# Patient Record
Sex: MALE | Race: WHITE | NOT HISPANIC OR LATINO | URBAN - METROPOLITAN AREA
[De-identification: names, ages, dates, MRNs, and addresses within clinical notes are randomized per-mention and may not be internally consistent; named-entity substitution may affect disease eponyms.]

---

## 2018-09-19 PROBLEM — F41.0 PANIC DISORDER WITHOUT AGORAPHOBIA: Chronic | Status: ACTIVE | Noted: 2018-09-19

## 2018-09-19 PROBLEM — M51.26 DISPLACEMENT OF LUMBAR INTERVERTEBRAL DISC WITHOUT MYELOPATHY: Chronic | Status: ACTIVE | Noted: 2018-09-19

## 2018-09-19 PROBLEM — J30.9 ATOPIC RHINITIS: Chronic | Status: ACTIVE | Noted: 2018-09-19

## 2018-09-19 PROBLEM — F90.0 ADHD, PREDOMINANTLY INATTENTIVE TYPE: Chronic | Status: ACTIVE | Noted: 2018-09-19

## 2018-09-19 RX ORDER — CANDESARTAN CILEXETIL AND HYDROCHLOROTHIAZIDE 32; 12.5 MG/1; MG/1
1 TABLET ORAL DAILY
COMMUNITY
End: 2019-01-09 | Stop reason: SDUPTHER

## 2018-09-19 RX ORDER — DEXTROAMPHETAMINE SACCHARATE, AMPHETAMINE ASPARTATE MONOHYDRATE, DEXTROAMPHETAMINE SULFATE AND AMPHETAMINE SULFATE 7.5; 7.5; 7.5; 7.5 MG/1; MG/1; MG/1; MG/1
CAPSULE, EXTENDED RELEASE ORAL
Refills: 0 | COMMUNITY
Start: 2018-08-20 | End: 2018-10-02 | Stop reason: SDUPTHER

## 2018-09-19 RX ORDER — ROSUVASTATIN CALCIUM 10 MG/1
10 TABLET, COATED ORAL DAILY
COMMUNITY
End: 2018-11-08 | Stop reason: SDUPTHER

## 2018-10-02 ENCOUNTER — OFFICE VISIT (OUTPATIENT)
Dept: PRIMARY CARE | Facility: CLINIC | Age: 53
End: 2018-10-02
Payer: COMMERCIAL

## 2018-10-02 VITALS
WEIGHT: 188.6 LBS | DIASTOLIC BLOOD PRESSURE: 80 MMHG | BODY MASS INDEX: 26.4 KG/M2 | SYSTOLIC BLOOD PRESSURE: 120 MMHG | HEART RATE: 74 BPM | TEMPERATURE: 98.6 F | HEIGHT: 71 IN

## 2018-10-02 DIAGNOSIS — J06.9 ACUTE URI: Primary | ICD-10-CM

## 2018-10-02 DIAGNOSIS — F90.0 ADHD, PREDOMINANTLY INATTENTIVE TYPE: Chronic | ICD-10-CM

## 2018-10-02 PROCEDURE — 99213 OFFICE O/P EST LOW 20 MIN: CPT | Performed by: INTERNAL MEDICINE

## 2018-10-02 RX ORDER — DEXTROAMPHETAMINE SACCHARATE, AMPHETAMINE ASPARTATE MONOHYDRATE, DEXTROAMPHETAMINE SULFATE AND AMPHETAMINE SULFATE 7.5; 7.5; 7.5; 7.5 MG/1; MG/1; MG/1; MG/1
30 CAPSULE, EXTENDED RELEASE ORAL EVERY MORNING
Qty: 30 CAPSULE | Refills: 0 | Status: SHIPPED | OUTPATIENT
Start: 2018-11-02 | End: 2018-10-02 | Stop reason: SDUPTHER

## 2018-10-02 RX ORDER — DEXTROAMPHETAMINE SACCHARATE, AMPHETAMINE ASPARTATE MONOHYDRATE, DEXTROAMPHETAMINE SULFATE AND AMPHETAMINE SULFATE 7.5; 7.5; 7.5; 7.5 MG/1; MG/1; MG/1; MG/1
30 CAPSULE, EXTENDED RELEASE ORAL EVERY MORNING
Qty: 30 CAPSULE | Refills: 0 | Status: SHIPPED | OUTPATIENT
Start: 2018-10-02 | End: 2018-10-02 | Stop reason: SDUPTHER

## 2018-10-02 RX ORDER — AMOXICILLIN AND CLAVULANATE POTASSIUM 875; 125 MG/1; MG/1
1 TABLET, FILM COATED ORAL 2 TIMES DAILY
Qty: 14 TABLET | Refills: 0 | Status: SHIPPED | OUTPATIENT
Start: 2018-10-02 | End: 2018-12-18

## 2018-10-02 RX ORDER — DEXTROAMPHETAMINE SACCHARATE, AMPHETAMINE ASPARTATE MONOHYDRATE, DEXTROAMPHETAMINE SULFATE AND AMPHETAMINE SULFATE 7.5; 7.5; 7.5; 7.5 MG/1; MG/1; MG/1; MG/1
30 CAPSULE, EXTENDED RELEASE ORAL EVERY MORNING
Qty: 30 CAPSULE | Refills: 0 | Status: SHIPPED | OUTPATIENT
Start: 2018-12-02 | End: 2018-12-18 | Stop reason: SDUPTHER

## 2018-10-02 ASSESSMENT — ENCOUNTER SYMPTOMS
HEADACHES: 1
SINUS COMPLAINT: 1
SORE THROAT: 1

## 2018-10-02 NOTE — PROGRESS NOTES
[facility]  Dr. Jo Chow  [address]  [place] [address]  Phone: [phone]  Fax: [phone]      Patient ID: Riki Bundy                              : 1965    Visit Date: 10/4/2018    Chief Complaint: ADD (Needs med refill) and Sinus Problem (started Saturday)      HPI  Complains of sinus pain and congestion. Went to Urgent Care after a week and was told it was allergies. Did eventually get better and now has been sick again. Had been helping a person move out of a very dirty house. Feels now that he has a sinus infection with swollen upper cheeks. Seeing green yellow with occasional cough from his throat.      ADD   This is a chronic problem. The current episode started more than 1 year ago. The problem has been unchanged. Associated symptoms include congestion, headaches and a sore throat.   Sinus Problem   This is a new problem. The current episode started in the past 7 days. The problem has been gradually worsening since onset. The fever has been present for 1 to 2 days. Associated symptoms include congestion, headaches and a sore throat. Past treatments include nothing.     On Adderall and the dosing is working fine.    Subjective      Patient ID: Riki Bundy is a 53 y.o. male.    Patient Active Problem List   Diagnosis   • Hypercholesteremia   • Essential hypertension, benign   • Atopic rhinitis   • ADHD, predominantly inattentive type   • Panic disorder without agoraphobia   • Displacement of lumbar intervertebral disc without myelopathy         Current Outpatient Prescriptions:   •  [START ON 2018] amphetamine-dextroamphetamine XR (ADDERALL XR) 30 mg 24 hr capsule, Take 1 capsule (30 mg total) by mouth every morning Earliest Fill Date: 18., Disp: 30 capsule, Rfl: 0  •  candesartan-hydrochlorothiazid (ATACAND HCT) 32-12.5 mg per tablet, Take 1 tablet by mouth daily., Disp: , Rfl:   •  rosuvastatin (CRESTOR) 10 mg tablet,  "Take 10 mg by mouth daily., Disp: , Rfl:   •  amoxicillin-pot clavulanate (AUGMENTIN) 875-125 mg per tablet, Take 1 tablet by mouth 2 (two) times a day for 7 days., Disp: 14 tablet, Rfl: 0    Allergies   Allergen Reactions   • Sulfa (Sulfonamide Antibiotics) Other (see comments)     Get fever to the point of fainting   • Cephalosporins Rash       Social History     Social History   • Marital status:      Spouse name: N/A   • Number of children: N/A   • Years of education: N/A     Occupational History   • Not on file.     Social History Main Topics   • Smoking status: Never Smoker   • Smokeless tobacco: Never Used   • Alcohol use Not on file   • Drug use: Unknown   • Sexual activity: Not on file     Other Topics Concern   • Not on file     Social History Narrative   • No narrative on file       Health Maintenance   Topic Date Due   • Influenza Vaccine (1) 08/01/2018   • DTaP, Tdap, and Td Vaccines (2 - Td) 05/02/2021   • Colorectal Cancer Screening  09/21/2025   • HPV Vaccines  Aged Out   • Meningococcal Vaccine  Aged Out   • HIB Vaccines  Aged Out   • IPV Vaccines  Aged Out   • Hepatitis C Screening  Completed       Past Medical History:   Diagnosis Date   • Acute allergic rhinitis    • ADHD (attention deficit hyperactivity disorder), inattentive type    • Displacement of lumbar intervertebral disc without myelopathy    • Essential hypertension, benign    • Hypercholesteremia    • Panic disorder without agoraphobia        The following have been reviewed and updated as appropriate in this visit:         Review of System  Review of Systems   HENT: Positive for congestion and sore throat.    Neurological: Positive for headaches.       Objective     Vitals  Vitals:    10/02/18 1125   BP: 120/80   Pulse: 74   Temp: 37 °C (98.6 °F)   Weight: 85.5 kg (188 lb 9.6 oz)   Height: 1.803 m (5' 11\")       Body mass index is 26.3 kg/m².    Physical Exam  Physical Exam   Constitutional: He appears well-nourished. No " distress.   HENT:   Head: Normocephalic.   Nose: Nose normal.   Mouth/Throat: Posterior oropharyngeal erythema present. No posterior oropharyngeal edema.   with fluid noted beneath the right orbit and swelling of the left cheek   Eyes: Conjunctivae and EOM are normal. Pupils are equal, round, and reactive to light. Right eye exhibits no discharge. Left eye exhibits no discharge.   Neck: Normal range of motion. Neck supple.   Cardiovascular: Normal rate, regular rhythm, normal heart sounds and intact distal pulses.  Exam reveals no gallop.    No murmur heard.  Pulmonary/Chest: Effort normal and breath sounds normal. He has no wheezes. He has no rales.   Abdominal: Soft. Bowel sounds are normal. He exhibits no distension. There is no tenderness.   Musculoskeletal: Normal range of motion. He exhibits no edema or deformity.   Lymphadenopathy:     He has no cervical adenopathy.   Skin: Skin is warm and dry.   Vitals reviewed.      Assessment/Plan     Problem List Items Addressed This Visit     ADHD, predominantly inattentive type (Chronic)     3 scripts provided for Adderall.         Relevant Medications    amphetamine-dextroamphetamine XR (ADDERALL XR) 30 mg 24 hr capsule (Start on 12/2/2018)      Other Visit Diagnoses     Acute URI    -  Primary    Rest and fluids. Call if sxs fail to clear.    Relevant Medications    amoxicillin-pot clavulanate (AUGMENTIN) 875-125 mg per tablet        Greater than 50% of the visit time was spent in counseling and coordination of care.  20  minutes.      There are no Patient Instructions on file for this visit.    No Follow-up on file.      Jo Chow MD  10/4/2018

## 2018-11-08 RX ORDER — ROSUVASTATIN CALCIUM 10 MG/1
10 TABLET, FILM COATED ORAL
Qty: 90 TABLET | Refills: 3 | Status: SHIPPED | OUTPATIENT
Start: 2018-11-08 | End: 2020-06-05 | Stop reason: SDUPTHER

## 2018-11-08 RX ORDER — ROSUVASTATIN CALCIUM 10 MG/1
TABLET, FILM COATED ORAL
Qty: 90 TABLET | Refills: 3 | Status: SHIPPED | OUTPATIENT
Start: 2018-11-08 | End: 2018-11-08 | Stop reason: SDUPTHER

## 2018-11-23 ENCOUNTER — TELEPHONE (OUTPATIENT)
Dept: PRIMARY CARE | Facility: CLINIC | Age: 53
End: 2018-11-23

## 2018-11-23 NOTE — TELEPHONE ENCOUNTER
S/W pt at length-went to Urgent Care, then ER and has also followed up with his eye doctor, is doing better and will continue to monitor

## 2018-11-23 NOTE — TELEPHONE ENCOUNTER
Received a fax from ER pt was seen 11/21/2018 for Corneal Abrasion . Was sent to  please call and follow up

## 2018-12-18 ENCOUNTER — OFFICE VISIT (OUTPATIENT)
Dept: PRIMARY CARE | Facility: CLINIC | Age: 53
End: 2018-12-18
Payer: COMMERCIAL

## 2018-12-18 VITALS
SYSTOLIC BLOOD PRESSURE: 126 MMHG | BODY MASS INDEX: 26.88 KG/M2 | HEIGHT: 71 IN | DIASTOLIC BLOOD PRESSURE: 78 MMHG | WEIGHT: 192 LBS | HEART RATE: 68 BPM

## 2018-12-18 DIAGNOSIS — I10 ESSENTIAL HYPERTENSION, BENIGN: ICD-10-CM

## 2018-12-18 DIAGNOSIS — F90.0 ADHD, PREDOMINANTLY INATTENTIVE TYPE: Primary | Chronic | ICD-10-CM

## 2018-12-18 PROCEDURE — 99213 OFFICE O/P EST LOW 20 MIN: CPT | Performed by: INTERNAL MEDICINE

## 2018-12-18 RX ORDER — DEXTROAMPHETAMINE SACCHARATE, AMPHETAMINE ASPARTATE MONOHYDRATE, DEXTROAMPHETAMINE SULFATE AND AMPHETAMINE SULFATE 7.5; 7.5; 7.5; 7.5 MG/1; MG/1; MG/1; MG/1
30 CAPSULE, EXTENDED RELEASE ORAL EVERY MORNING
Qty: 30 CAPSULE | Refills: 0 | Status: SHIPPED | OUTPATIENT
Start: 2019-01-18 | End: 2018-12-18 | Stop reason: SDUPTHER

## 2018-12-18 RX ORDER — DEXTROAMPHETAMINE SACCHARATE, AMPHETAMINE ASPARTATE MONOHYDRATE, DEXTROAMPHETAMINE SULFATE AND AMPHETAMINE SULFATE 7.5; 7.5; 7.5; 7.5 MG/1; MG/1; MG/1; MG/1
30 CAPSULE, EXTENDED RELEASE ORAL EVERY MORNING
Qty: 30 CAPSULE | Refills: 0 | Status: SHIPPED | OUTPATIENT
Start: 2019-02-18 | End: 2019-04-12 | Stop reason: SDUPTHER

## 2018-12-18 RX ORDER — DEXTROAMPHETAMINE SACCHARATE, AMPHETAMINE ASPARTATE MONOHYDRATE, DEXTROAMPHETAMINE SULFATE AND AMPHETAMINE SULFATE 7.5; 7.5; 7.5; 7.5 MG/1; MG/1; MG/1; MG/1
30 CAPSULE, EXTENDED RELEASE ORAL EVERY MORNING
Qty: 30 CAPSULE | Refills: 0 | Status: SHIPPED | OUTPATIENT
Start: 2018-12-18 | End: 2018-12-18 | Stop reason: SDUPTHER

## 2018-12-18 NOTE — PROGRESS NOTES
[facility]  Dr. Jo Chow  [address]  [place] [address]  Phone: [phone]  Fax: [phone]      Patient ID: Riki Bundy                              : 1965    Visit Date: 2018    Chief Complaint: Med Refill      HPI  Was seen in the ER with a corneal abrasion. Was clearing brush into the woods at sunset with sun glasses.      ADHD   This is a chronic problem. The current episode started more than 1 year ago. The problem occurs daily. The problem has been unchanged. Pertinent negatives include no chest pain, fatigue, headaches or weakness. The symptoms are aggravated by stress.   Hypertension   This is a chronic problem. The current episode started more than 1 year ago. The problem has been waxing and waning since onset. The problem is controlled. Pertinent negatives include no chest pain, headaches, palpitations or shortness of breath. Agents associated with hypertension include amphetamines. Risk factors for coronary artery disease include male gender and stress. Past treatments include diuretics and angiotensin blockers. The current treatment provides significant improvement. There are no compliance problems.        Subjective      Patient ID: Riki Bundy is a 53 y.o. male.    Patient Active Problem List   Diagnosis   • Hypercholesteremia   • Essential hypertension, benign   • Atopic rhinitis   • ADHD, predominantly inattentive type   • Panic disorder without agoraphobia   • Displacement of lumbar intervertebral disc without myelopathy         Current Outpatient Prescriptions:   •  [START ON 2019] amphetamine-dextroamphetamine XR (ADDERALL XR) 30 mg 24 hr capsule, Take 1 capsule (30 mg total) by mouth every morning Earliest Fill Date: 19., Disp: 30 capsule, Rfl: 0  •  candesartan-hydrochlorothiazid (ATACAND HCT) 32-12.5 mg per tablet, Take 1 tablet by mouth daily., Disp: , Rfl:   •  CRESTOR 10 mg tablet, Take 1 tablet  "(10 mg total) by mouth once daily., Disp: 90 tablet, Rfl: 3    Allergies   Allergen Reactions   • Sulfa (Sulfonamide Antibiotics) Other (see comments)     Get fever to the point of fainting   • Cephalosporins Rash       Social History     Social History   • Marital status:      Spouse name: N/A   • Number of children: N/A   • Years of education: N/A     Occupational History   • Not on file.     Social History Main Topics   • Smoking status: Never Smoker   • Smokeless tobacco: Never Used   • Alcohol use Not on file   • Drug use: Unknown   • Sexual activity: Not on file     Other Topics Concern   • Not on file     Social History Narrative   • No narrative on file       Health Maintenance   Topic Date Due   • Influenza Vaccine (1) 08/01/2018   • DTaP, Tdap, and Td Vaccines (2 - Td) 05/02/2021   • Colonoscopy  09/21/2025   • HPV Vaccines  Aged Out   • Meningococcal Vaccine  Aged Out   • HIB Vaccines  Aged Out   • IPV Vaccines  Aged Out   • Hepatitis C Screening  Completed       Past Medical History:   Diagnosis Date   • Acute allergic rhinitis    • ADHD (attention deficit hyperactivity disorder), inattentive type    • Displacement of lumbar intervertebral disc without myelopathy    • Essential hypertension, benign    • Hypercholesteremia    • Panic disorder without agoraphobia        The following have been reviewed and updated as appropriate in this visit:  Allergies  Meds  Problems         Review of System  Review of Systems   Constitutional: Negative for activity change and fatigue.   Respiratory: Negative for shortness of breath.    Cardiovascular: Negative for chest pain and palpitations.   Neurological: Negative for dizziness, weakness and headaches.       Objective     Vitals  Vitals:    12/18/18 1043   BP: 126/78   Pulse: 68   Weight: 87.1 kg (192 lb)   Height: 1.803 m (5' 11\")       Body mass index is 26.78 kg/m².    Physical Exam  Physical Exam   Constitutional: He is oriented to person, place, and " time. He appears well-nourished. No distress.   HENT:   Head: Normocephalic.   Nose: Nose normal.   Mouth/Throat: Oropharynx is clear and moist.   Eyes: Conjunctivae and EOM are normal. Pupils are equal, round, and reactive to light.   Neck: Normal range of motion. Neck supple. Carotid bruit is not present.   Cardiovascular: Normal rate, regular rhythm and normal heart sounds.  Exam reveals no gallop.    No murmur heard.  Pulmonary/Chest: Effort normal and breath sounds normal.   Abdominal: Soft. Bowel sounds are normal.   Neurological: He is alert and oriented to person, place, and time. Coordination normal.   Vitals reviewed.      Assessment/Plan     Problem List Items Addressed This Visit     Essential hypertension, benign     Stable with current therapy.         ADHD, predominantly inattentive type - Primary (Chronic)    Relevant Medications    amphetamine-dextroamphetamine XR (ADDERALL XR) 30 mg 24 hr capsule (Start on 2/18/2019)          There are no Patient Instructions on file for this visit.    Return in about 3 months (around 3/18/2019).      Jo Chow MD  12/20/2018

## 2018-12-20 ASSESSMENT — ENCOUNTER SYMPTOMS
WEAKNESS: 0
ACTIVITY CHANGE: 0
HEADACHES: 0
FATIGUE: 0
HYPERTENSION: 1
DIZZINESS: 0
SHORTNESS OF BREATH: 0
PALPITATIONS: 0

## 2019-01-09 ENCOUNTER — OFFICE VISIT (OUTPATIENT)
Dept: PRIMARY CARE | Facility: CLINIC | Age: 54
End: 2019-01-09
Payer: COMMERCIAL

## 2019-01-09 VITALS
HEIGHT: 71 IN | DIASTOLIC BLOOD PRESSURE: 90 MMHG | WEIGHT: 190 LBS | HEART RATE: 68 BPM | BODY MASS INDEX: 26.6 KG/M2 | TEMPERATURE: 97.9 F | SYSTOLIC BLOOD PRESSURE: 140 MMHG

## 2019-01-09 DIAGNOSIS — I10 ESSENTIAL HYPERTENSION, BENIGN: ICD-10-CM

## 2019-01-09 DIAGNOSIS — J01.00 ACUTE NON-RECURRENT MAXILLARY SINUSITIS: Primary | ICD-10-CM

## 2019-01-09 DIAGNOSIS — Z12.5 PROSTATE CANCER SCREENING: ICD-10-CM

## 2019-01-09 DIAGNOSIS — M54.2 NECK PAIN: ICD-10-CM

## 2019-01-09 DIAGNOSIS — M25.562 ACUTE PAIN OF LEFT KNEE: ICD-10-CM

## 2019-01-09 DIAGNOSIS — E78.00 HYPERCHOLESTEREMIA: ICD-10-CM

## 2019-01-09 PROCEDURE — 99214 OFFICE O/P EST MOD 30 MIN: CPT | Performed by: NURSE PRACTITIONER

## 2019-01-09 RX ORDER — CANDESARTAN CILEXETIL AND HYDROCHLOROTHIAZIDE 32; 12.5 MG/1; MG/1
1 TABLET ORAL DAILY
Qty: 14 TABLET | Refills: 0 | Status: SHIPPED | OUTPATIENT
Start: 2019-01-09 | End: 2019-01-09 | Stop reason: SDUPTHER

## 2019-01-09 RX ORDER — CANDESARTAN CILEXETIL AND HYDROCHLOROTHIAZIDE 32; 12.5 MG/1; MG/1
1 TABLET ORAL DAILY
Qty: 90 TABLET | Refills: 1 | Status: SHIPPED | OUTPATIENT
Start: 2019-01-09 | End: 2019-06-27 | Stop reason: SDUPTHER

## 2019-01-09 RX ORDER — AMOXICILLIN AND CLAVULANATE POTASSIUM 875; 125 MG/1; MG/1
1 TABLET, FILM COATED ORAL 2 TIMES DAILY
Qty: 20 TABLET | Refills: 0 | Status: SHIPPED | OUTPATIENT
Start: 2019-01-09 | End: 2019-04-12

## 2019-01-09 ASSESSMENT — ENCOUNTER SYMPTOMS
HEADACHES: 1
BLURRED VISION: 0
COUGH: 0
SHORTNESS OF BREATH: 0
MUSCLE WEAKNESS: 0
INABILITY TO BEAR WEIGHT: 0
SINUS PRESSURE: 1
CHILLS: 0
LOSS OF MOTION: 0
NECK PAIN: 0
SORE THROAT: 0
LOSS OF SENSATION: 0
NUMBNESS: 0
HOARSE VOICE: 0
ORTHOPNEA: 0
SINUS COMPLAINT: 1
SWEATS: 0
TINGLING: 0
SWOLLEN GLANDS: 0
HYPERTENSION: 1
PALPITATIONS: 0
DIAPHORESIS: 0
PND: 0

## 2019-01-09 NOTE — PROGRESS NOTES
[facility]  Lola Bonilla  [address]  [place] [address]  Phone: [phone]  Fax: [phone]      Patient ID: Riki Bundy                              : 1965    Visit Date: 2019    Chief Complaint: Sinus Problem         Patient ID: Riki Bundy is a 53 y.o. male.    Patient Active Problem List   Diagnosis   • Hypercholesteremia   • Essential hypertension, benign   • Atopic rhinitis   • ADHD, predominantly inattentive type   • Panic disorder without agoraphobia   • Displacement of lumbar intervertebral disc without myelopathy   • Acute pain of left knee   • Neck pain         Current Outpatient Prescriptions:   •  amoxicillin-pot clavulanate (AUGMENTIN) 875-125 mg per tablet, Take 1 tablet by mouth 2 (two) times a day for 10 days., Disp: 20 tablet, Rfl: 0  •  [START ON 2019] amphetamine-dextroamphetamine XR (ADDERALL XR) 30 mg 24 hr capsule, Take 1 capsule (30 mg total) by mouth every morning Earliest Fill Date: 19., Disp: 30 capsule, Rfl: 0  •  candesartan-hydrochlorothiazid (ATACAND HCT) 32-12.5 mg per tablet, Take 1 tablet by mouth daily., Disp: 90 tablet, Rfl: 1  •  CRESTOR 10 mg tablet, Take 1 tablet (10 mg total) by mouth once daily., Disp: 90 tablet, Rfl: 3    Allergies   Allergen Reactions   • Sulfa (Sulfonamide Antibiotics) Other (see comments)     Get fever to the point of fainting   • Cephalosporins Rash       Social History     Social History   • Marital status:      Spouse name: N/A   • Number of children: N/A   • Years of education: N/A     Occupational History   • Not on file.     Social History Main Topics   • Smoking status: Never Smoker   • Smokeless tobacco: Never Used   • Alcohol use Not on file   • Drug use: Unknown   • Sexual activity: Not on file     Other Topics Concern   • Not on file     Social History Narrative   • No narrative on file       Health Maintenance   Topic Date Due   • Influenza  Vaccine (1) 08/01/2018   • DTaP, Tdap, and Td Vaccines (2 - Td) 05/02/2021   • Colonoscopy  09/21/2025   • HPV Vaccines  Aged Out   • Meningococcal Vaccine  Aged Out   • HIB Vaccines  Aged Out   • IPV Vaccines  Aged Out   • Hepatitis C Screening  Completed       HPI  Here for sinus issues for the past week. Worsening pain and pressure. Hx of sinusitis. Augmentin works best for him.    Ran out of his BP meds in the past week. Needs mail order and local pharm to hold him over. No recent labs.    Acute knee and neck pain. Worse lately with having to do a clean out of his sister in law's basement who was a hoarder.       Sinus Problem   This is a new problem. The current episode started in the past 7 days. The problem has been gradually worsening since onset. There has been no fever. The pain is moderate. Associated symptoms include congestion, headaches and sinus pressure. Pertinent negatives include no chills, coughing, diaphoresis, ear pain, hoarse voice, neck pain, shortness of breath, sneezing, sore throat or swollen glands. Past treatments include nothing.   Hypertension   This is a chronic problem. The current episode started more than 1 year ago. The problem is unchanged. The problem is controlled. Associated symptoms include headaches. Pertinent negatives include no anxiety, blurred vision, chest pain, malaise/fatigue, neck pain, orthopnea, palpitations, peripheral edema, PND, shortness of breath or sweats. There are no associated agents to hypertension. Risk factors for coronary artery disease include dyslipidemia and male gender. Past treatments include diuretics and angiotensin blockers. The current treatment provides significant improvement. There are no compliance problems.    Neck Pain    This is a new problem. The problem occurs daily. The problem has been unchanged. The pain is present in the occipital region. The quality of the pain is described as aching and shooting. The pain is at a severity of  "4/10. The pain is moderate. Associated symptoms include headaches. Pertinent negatives include no chest pain, numbness or tingling. He has tried nothing for the symptoms.   Knee Pain    There was no injury mechanism. The pain is present in the left knee. The quality of the pain is described as aching. The pain is at a severity of 3/10. The pain is mild. The pain has been fluctuating since onset. Pertinent negatives include no inability to bear weight, loss of motion, loss of sensation, muscle weakness, numbness or tingling. Associated symptoms comments: Popping and crepitus. He reports no foreign bodies present. The symptoms are aggravated by movement and weight bearing. He has tried nothing for the symptoms.       The following have been reviewed and updated as appropriate in this visit:  Allergies  Meds  Problems         Review of System  Review of Systems   Constitutional: Negative for chills, diaphoresis and malaise/fatigue.   HENT: Positive for congestion and sinus pressure. Negative for ear pain, hoarse voice, sneezing and sore throat.    Eyes: Negative for blurred vision.   Respiratory: Negative for cough and shortness of breath.    Cardiovascular: Negative for chest pain, palpitations, orthopnea and PND.   Musculoskeletal: Negative for neck pain.   Neurological: Positive for headaches. Negative for tingling and numbness.       Objective     Vitals  Vitals:    01/09/19 1225   BP: 140/90   Pulse: 68   Temp: 36.6 °C (97.9 °F)   Weight: 86.2 kg (190 lb)   Height: 1.803 m (5' 11\")     Body mass index is 26.5 kg/m².    Physical Exam  Physical Exam   Constitutional: He is oriented to person, place, and time. He appears well-developed and well-nourished. No distress.   HENT:   Right Ear: Tympanic membrane, external ear and ear canal normal.   Left Ear: Tympanic membrane, external ear and ear canal normal.   Nose: Mucosal edema present. No rhinorrhea or sinus tenderness. Right sinus exhibits maxillary sinus " tenderness and frontal sinus tenderness. Left sinus exhibits maxillary sinus tenderness and frontal sinus tenderness.   Mouth/Throat: Posterior oropharyngeal erythema present. No oropharyngeal exudate, posterior oropharyngeal edema or tonsillar abscesses.   Neck: Neck supple. No JVD present. No thyromegaly present.   Cardiovascular: Normal rate, regular rhythm and normal heart sounds.    No murmur heard.  Pulmonary/Chest: Effort normal and breath sounds normal. No respiratory distress. He has no wheezes.   Musculoskeletal:        Left knee: He exhibits normal range of motion, no swelling, no effusion and no erythema. No tenderness found.        Cervical back: He exhibits decreased range of motion, pain and spasm. He exhibits no tenderness, no swelling and no edema.   Lymphadenopathy:     He has no cervical adenopathy.   Neurological: He is alert and oriented to person, place, and time.   Skin: He is not diaphoretic.   Vitals reviewed.      Assessment/Plan     Problem List Items Addressed This Visit     Hypercholesteremia     Labs ordered on statin.         Relevant Orders    Lipid panel    Essential hypertension, benign     Resume meds.  #14 day and #90 day given  Screening labs ordered  Follow up BP check 4 weeks         Relevant Medications    candesartan-hydrochlorothiazid (ATACAND HCT) 32-12.5 mg per tablet    Other Relevant Orders    CBC and Differential    Comprehensive metabolic panel    Urinalysis with Reflex Culture    Acute pain of left knee     Xray L knee.  R.I.C.E  Follow up to discuss 4 weeks         Relevant Orders    X-RAY KNEE LEFT 4+ VIEWS    Neck pain     C spine xray.  Discuss treatment at next visit.         Relevant Orders    X-RAY CERVICAL SPINE COMPLETE 4 OR 5 VIEWS      Other Visit Diagnoses     Acute non-recurrent maxillary sinusitis    -  Primary    Relevant Medications    amoxicillin-pot clavulanate (AUGMENTIN) 875-125 mg per tablet    Prostate cancer screening        Relevant Orders     PSA              LAURA Baum  1/9/2019

## 2019-04-12 ENCOUNTER — OFFICE VISIT (OUTPATIENT)
Dept: PRIMARY CARE | Facility: CLINIC | Age: 54
End: 2019-04-12
Payer: COMMERCIAL

## 2019-04-12 VITALS
BODY MASS INDEX: 25.62 KG/M2 | SYSTOLIC BLOOD PRESSURE: 124 MMHG | WEIGHT: 183 LBS | HEIGHT: 71 IN | HEART RATE: 62 BPM | OXYGEN SATURATION: 98 % | DIASTOLIC BLOOD PRESSURE: 82 MMHG

## 2019-04-12 DIAGNOSIS — E78.00 HYPERCHOLESTEREMIA: ICD-10-CM

## 2019-04-12 DIAGNOSIS — I10 ESSENTIAL HYPERTENSION, BENIGN: ICD-10-CM

## 2019-04-12 DIAGNOSIS — F90.0 ADHD, PREDOMINANTLY INATTENTIVE TYPE: Primary | Chronic | ICD-10-CM

## 2019-04-12 PROCEDURE — 99213 OFFICE O/P EST LOW 20 MIN: CPT | Performed by: INTERNAL MEDICINE

## 2019-04-12 RX ORDER — DEXTROAMPHETAMINE SACCHARATE, AMPHETAMINE ASPARTATE MONOHYDRATE, DEXTROAMPHETAMINE SULFATE AND AMPHETAMINE SULFATE 7.5; 7.5; 7.5; 7.5 MG/1; MG/1; MG/1; MG/1
30 CAPSULE, EXTENDED RELEASE ORAL EVERY MORNING
Qty: 30 CAPSULE | Refills: 0 | Status: SHIPPED | OUTPATIENT
Start: 2019-04-12 | End: 2019-05-15 | Stop reason: SDUPTHER

## 2019-04-12 RX ORDER — DEXTROAMPHETAMINE SACCHARATE, AMPHETAMINE ASPARTATE MONOHYDRATE, DEXTROAMPHETAMINE SULFATE AND AMPHETAMINE SULFATE 7.5; 7.5; 7.5; 7.5 MG/1; MG/1; MG/1; MG/1
30 CAPSULE, EXTENDED RELEASE ORAL EVERY MORNING
Qty: 30 CAPSULE | Refills: 0 | Status: CANCELLED | OUTPATIENT
Start: 2019-04-12

## 2019-04-12 NOTE — PROGRESS NOTES
[facility]  Dr. Jo Chow  [address]  [place] [address]  Phone: [phone]  Fax: [phone]      Patient ID: Riki Bundy                              : 1965    Visit Date: 2019    Chief Complaint: Follow-up (PDMP check OK)      HPI  Seen for ADD and medication renewal.  Still with [employer] and travelling more.  He believes the electric transmission of his Adderal script may in fact work for him.  No change in medical therapy.        Subjective      Patient ID: Riki Bundy is a 54 y.o. male.    Patient Active Problem List   Diagnosis   • Hypercholesteremia   • Essential hypertension, benign   • Atopic rhinitis   • ADHD, predominantly inattentive type   • Panic disorder without agoraphobia   • Displacement of lumbar intervertebral disc without myelopathy   • Acute pain of left knee   • Neck pain         Current Outpatient Prescriptions:   •  amphetamine-dextroamphetamine XR (ADDERALL XR) 30 mg 24 hr capsule, Take 1 capsule (30 mg total) by mouth every morning., Disp: 30 capsule, Rfl: 0  •  candesartan-hydrochlorothiazid (ATACAND HCT) 32-12.5 mg per tablet, Take 1 tablet by mouth daily., Disp: 90 tablet, Rfl: 1  •  CRESTOR 10 mg tablet, Take 1 tablet (10 mg total) by mouth once daily., Disp: 90 tablet, Rfl: 3    Allergies   Allergen Reactions   • Sulfa (Sulfonamide Antibiotics) Other (see comments)     Get fever to the point of fainting   • Cephalosporins Rash       Social History     Social History   • Marital status:      Spouse name: N/A   • Number of children: N/A   • Years of education: N/A     Occupational History   • Not on file.     Social History Main Topics   • Smoking status: Never Smoker   • Smokeless tobacco: Never Used   • Alcohol use Not on file   • Drug use: Unknown   • Sexual activity: Not on file     Other Topics Concern   • Not on file     Social History Narrative   • No narrative on file       Health  "Maintenance   Topic Date Due   • Zoster Vaccine (1 of 2) 02/09/2014   • Influenza Vaccine (Season Ended) 08/01/2019   • DTaP, Tdap, and Td Vaccines (2 - Td) 05/02/2021   • Colonoscopy  09/21/2025   • Pneumococcal (65 years and older) (1 of 2 - PCV13) 02/09/2029   • Meningococcal ACWY  Aged Out   • Varicella Vaccines  Aged Out   • HIB Vaccines  Aged Out   • IPV Vaccines  Aged Out   • HPV Vaccines  Aged Out   • Hepatitis C Screening  Completed   • Pneumococcal (0-64 years)  Aged Out       Past Medical History:   Diagnosis Date   • Acute allergic rhinitis    • ADHD (attention deficit hyperactivity disorder), inattentive type    • Displacement of lumbar intervertebral disc without myelopathy    • Essential hypertension, benign    • Hypercholesteremia    • Panic disorder without agoraphobia        The following have been reviewed and updated as appropriate in this visit:  Allergies  Meds  Problems         Review of System  Review of Systems   Constitutional: Negative for unexpected weight change.   Eyes: Negative for itching.   Respiratory: Negative for chest tightness, shortness of breath and wheezing.    Cardiovascular: Negative for chest pain, palpitations and leg swelling.   Gastrointestinal: Negative for abdominal distention.   Musculoskeletal: Negative for arthralgias.   Neurological: Negative for dizziness.       Objective     Vitals  Vitals:    04/12/19 1105   BP: 124/82   BP Location: Right upper arm   Patient Position: Sitting   Pulse: 62   SpO2: 98%   Weight: 83 kg (183 lb)   Height: 1.803 m (5' 10.98\")       Body mass index is 25.53 kg/m².    Physical Exam  Physical Exam   Constitutional: He is oriented to person, place, and time. He appears well-nourished. No distress.   HENT:   Head: Normocephalic.   Nose: Nose normal.   Mouth/Throat: Oropharynx is clear and moist.   Eyes: Pupils are equal, round, and reactive to light. Conjunctivae and EOM are normal.   Neck: Normal range of motion. Neck supple. "   Cardiovascular: Normal rate, regular rhythm and normal heart sounds.    Pulmonary/Chest: Effort normal and breath sounds normal.   Musculoskeletal: Normal range of motion. He exhibits no edema or deformity.   Neurological: He is alert and oriented to person, place, and time. Coordination normal.   Skin: Skin is warm and dry.   Vitals reviewed.      Assessment/Plan     Problem List Items Addressed This Visit     Hypercholesteremia     Due for lab studies on Crestor.         Essential hypertension, benign     Stable on current therapy.         ADHD, predominantly inattentive type - Primary (Chronic)    Relevant Medications    amphetamine-dextroamphetamine XR (ADDERALL XR) 30 mg 24 hr capsule      Labs were ordered for pt 1/2019. Will ask staff to remind the pt these need to be completed.    There are no Patient Instructions on file for this visit.    Return in about 6 months (around 10/12/2019).      Jo Chow MD  4/13/2019

## 2019-04-13 ENCOUNTER — TELEPHONE (OUTPATIENT)
Dept: PRIMARY CARE | Facility: CLINIC | Age: 54
End: 2019-04-13

## 2019-04-13 ASSESSMENT — ENCOUNTER SYMPTOMS
WHEEZING: 0
ABDOMINAL DISTENTION: 0
DIZZINESS: 0
UNEXPECTED WEIGHT CHANGE: 0
ARTHRALGIAS: 0
EYE ITCHING: 0
SHORTNESS OF BREATH: 0
CHEST TIGHTNESS: 0
PALPITATIONS: 0

## 2019-04-13 NOTE — TELEPHONE ENCOUNTER
Staff, Please contact the pt who was just in the office 4/12, reminding him that he has labs to be done that were ordered in January. He does need to fast but drink plenty of water. Thx

## 2019-05-15 DIAGNOSIS — F90.0 ADHD, PREDOMINANTLY INATTENTIVE TYPE: Chronic | ICD-10-CM

## 2019-05-15 RX ORDER — DEXTROAMPHETAMINE SACCHARATE, AMPHETAMINE ASPARTATE MONOHYDRATE, DEXTROAMPHETAMINE SULFATE AND AMPHETAMINE SULFATE 7.5; 7.5; 7.5; 7.5 MG/1; MG/1; MG/1; MG/1
30 CAPSULE, EXTENDED RELEASE ORAL EVERY MORNING
Qty: 30 CAPSULE | Refills: 0 | Status: SHIPPED | OUTPATIENT
Start: 2019-05-15 | End: 2019-06-14 | Stop reason: SDUPTHER

## 2019-05-15 NOTE — TELEPHONE ENCOUNTER
Please advise the pt that he must have the labs done prior to his visit on 6/27. His refills may be in jeopardy as it is essential that we know the status of his liver and kidneys.

## 2019-06-14 DIAGNOSIS — F90.0 ADHD, PREDOMINANTLY INATTENTIVE TYPE: Chronic | ICD-10-CM

## 2019-06-14 RX ORDER — DEXTROAMPHETAMINE SACCHARATE, AMPHETAMINE ASPARTATE MONOHYDRATE, DEXTROAMPHETAMINE SULFATE AND AMPHETAMINE SULFATE 7.5; 7.5; 7.5; 7.5 MG/1; MG/1; MG/1; MG/1
30 CAPSULE, EXTENDED RELEASE ORAL EVERY MORNING
Qty: 30 CAPSULE | Refills: 0 | Status: SHIPPED | OUTPATIENT
Start: 2019-06-14 | End: 2019-07-12 | Stop reason: SDUPTHER

## 2019-06-14 NOTE — TELEPHONE ENCOUNTER
Medicine Refill Request    Last Office Visit: 4/12/2019  Next Office Visit: 6/27/2019        Current Outpatient Prescriptions:   •  amphetamine-dextroamphetamine XR (ADDERALL XR) 30 mg 24 hr capsule, Take 1 capsule (30 mg total) by mouth every morning., Disp: 30 capsule, Rfl: 0  •  candesartan-hydrochlorothiazid (ATACAND HCT) 32-12.5 mg per tablet, Take 1 tablet by mouth daily., Disp: 90 tablet, Rfl: 1  •  CRESTOR 10 mg tablet, Take 1 tablet (10 mg total) by mouth once daily., Disp: 90 tablet, Rfl: 3      BP Readings from Last 3 Encounters:   04/12/19 124/82   01/09/19 140/90   12/18/18 126/78       Recent Lab results:  No results found for: CHOL, No results found for: HDL, No results found for: LDLCALC, No results found for: TRIG     No results found for: GLUCOSE, No results found for: HGBA1C      No results found for: CREATININE    No results found for: TSH

## 2019-06-27 ENCOUNTER — OFFICE VISIT (OUTPATIENT)
Dept: PRIMARY CARE | Facility: CLINIC | Age: 54
End: 2019-06-27
Payer: COMMERCIAL

## 2019-06-27 VITALS
DIASTOLIC BLOOD PRESSURE: 102 MMHG | OXYGEN SATURATION: 98 % | SYSTOLIC BLOOD PRESSURE: 160 MMHG | WEIGHT: 183 LBS | HEIGHT: 71 IN | HEART RATE: 78 BPM | BODY MASS INDEX: 25.62 KG/M2

## 2019-06-27 DIAGNOSIS — Z00.01 ENCOUNTER FOR ROUTINE ADULT HEALTH EXAMINATION WITH ABNORMAL FINDINGS: ICD-10-CM

## 2019-06-27 DIAGNOSIS — L72.3 SEBACEOUS CYST OF RIGHT AXILLA: ICD-10-CM

## 2019-06-27 DIAGNOSIS — I10 ESSENTIAL HYPERTENSION, BENIGN: ICD-10-CM

## 2019-06-27 DIAGNOSIS — F90.0 ADHD, PREDOMINANTLY INATTENTIVE TYPE: Primary | Chronic | ICD-10-CM

## 2019-06-27 DIAGNOSIS — E78.00 HYPERCHOLESTEREMIA: ICD-10-CM

## 2019-06-27 PROCEDURE — 99214 OFFICE O/P EST MOD 30 MIN: CPT | Performed by: INTERNAL MEDICINE

## 2019-06-27 RX ORDER — CANDESARTAN CILEXETIL AND HYDROCHLOROTHIAZIDE 32; 12.5 MG/1; MG/1
1 TABLET ORAL DAILY
Qty: 30 TABLET | Refills: 1 | Status: SHIPPED | OUTPATIENT
Start: 2019-06-27 | End: 2019-08-27 | Stop reason: SDUPTHER

## 2019-06-27 NOTE — PROGRESS NOTES
"[facility]  Dr. Jo Chow  [address]  [place] [address]  Phone: [phone]  Fax: [phone]      Patient ID: Riki Bundy                              : 1965    Visit Date: 2019    Chief Complaint: Follow-up (Med refills-hasn't had BP med in \"awhile\")      HPI  Admits he ran out of his BP medication over two weeks ago. May have a little headache but otherwise is not having sxs.   Working in the yard.  Due for labs he believes.  Has been compliant with Adderall.  He at the end of the visit mentions a painful swelling that occurred under his right arm that has cleared but left a red area and small nodule. Wonders if a dermatologist would remove it.        Subjective      Patient ID: Riki Bundy is a 54 y.o. male.    Patient Active Problem List   Diagnosis   • Hypercholesteremia   • Essential hypertension, benign   • Atopic rhinitis   • ADHD, predominantly inattentive type   • Panic disorder without agoraphobia   • Displacement of lumbar intervertebral disc without myelopathy   • Acute pain of left knee   • Neck pain   • Sebaceous cyst of right axilla         Current Outpatient Prescriptions:   •  amphetamine-dextroamphetamine XR (ADDERALL XR) 30 mg 24 hr capsule, Take 1 capsule (30 mg total) by mouth every morning., Disp: 30 capsule, Rfl: 0  •  candesartan-hydrochlorothiazid (ATACAND HCT) 32-12.5 mg per tablet, Take 1 tablet by mouth daily., Disp: 30 tablet, Rfl: 1  •  CRESTOR 10 mg tablet, Take 1 tablet (10 mg total) by mouth once daily., Disp: 90 tablet, Rfl: 3    Allergies   Allergen Reactions   • Sulfa (Sulfonamide Antibiotics) Other (see comments)     Get fever to the point of fainting   • Cephalosporins Rash       Social History     Social History   • Marital status:      Spouse name: N/A   • Number of children: N/A   • Years of education: N/A     Occupational History   • Not on file.     Social History Main Topics   • " "Smoking status: Never Smoker   • Smokeless tobacco: Never Used   • Alcohol use Not on file   • Drug use: Unknown   • Sexual activity: Not on file     Other Topics Concern   • Not on file     Social History Narrative   • No narrative on file       Health Maintenance   Topic Date Due   • Zoster Vaccine (1 of 2) 02/09/2014   • Influenza Vaccine (Season Ended) 08/01/2019   • DTaP, Tdap, and Td Vaccines (2 - Td) 05/02/2021   • Colonoscopy  09/21/2025   • Meningococcal ACWY  Aged Out   • Varicella Vaccines  Aged Out   • HIB Vaccines  Aged Out   • IPV Vaccines  Aged Out   • HPV Vaccines  Aged Out   • Hepatitis C Screening  Completed   • Pneumococcal  Aged Out       Past Medical History:   Diagnosis Date   • Acute allergic rhinitis    • ADHD (attention deficit hyperactivity disorder), inattentive type    • Displacement of lumbar intervertebral disc without myelopathy    • Essential hypertension, benign    • Hypercholesteremia    • Panic disorder without agoraphobia        The following have been reviewed and updated as appropriate in this visit:  Allergies  Meds  Problems         Review of System  Review of Systems   HENT: Negative for sinus pressure and sore throat.    Respiratory: Negative for cough, shortness of breath and wheezing.    Cardiovascular: Negative for chest pain, palpitations and leg swelling.   Skin: Positive for wound.   Neurological: Negative for dizziness.       Objective     Vitals  Vitals:    06/27/19 1133 06/27/19 1150   BP: (!) 170/110 (!) 160/102   BP Location: Right upper arm    Patient Position: Sitting    Pulse: 78    SpO2: 98%    Weight: 83 kg (183 lb)    Height: 1.803 m (5' 11\")        Body mass index is 25.52 kg/m².    Physical Exam  Physical Exam   Constitutional: He is oriented to person, place, and time. He appears well-nourished. No distress.   HENT:   Head: Normocephalic.   Nose: Nose normal.   Mouth/Throat: Oropharynx is clear and moist.   Eyes: Pupils are equal, round, and reactive " to light. Conjunctivae and EOM are normal.   Cardiovascular: Normal rate, regular rhythm and normal heart sounds.    No murmur heard.  Pulmonary/Chest: Effort normal and breath sounds normal. He has no wheezes. He has no rales.   Abdominal: Soft. Bowel sounds are normal. There is no tenderness.   Musculoskeletal: Normal range of motion. He exhibits no edema or deformity.   Neurological: He is alert and oriented to person, place, and time. Coordination normal.   Skin: Skin is warm and dry.   Healed lesion right axilla streaked with small nodule underneath   Vitals reviewed.      Assessment/Plan     Problem List Items Addressed This Visit     Hypercholesteremia    Relevant Orders    Lipid panel    Essential hypertension, benign    Relevant Medications    candesartan-hydrochlorothiazid (ATACAND HCT) 32-12.5 mg per tablet    Other Relevant Orders    Comprehensive metabolic panel    ADHD, predominantly inattentive type - Primary (Chronic)     Continues on Adderall.         Sebaceous cyst of right axilla    Relevant Orders    Ambulatory referral to Plastic Surgery      Other Visit Diagnoses     Encounter for routine adult health examination with abnormal findings        Relevant Orders    CBC and differential    PSA      Greater than 50% of the visit time was spent in counseling and coordination of care.  30 minutes.      There are no Patient Instructions on file for this visit.    Return in about 3 months (around 9/27/2019), or if symptoms worsen or fail to improve.      Jo Chow MD  6/30/2019

## 2019-06-30 ASSESSMENT — ENCOUNTER SYMPTOMS
DIZZINESS: 0
WOUND: 1
SHORTNESS OF BREATH: 0
COUGH: 0
PALPITATIONS: 0
SINUS PRESSURE: 0
SORE THROAT: 0
WHEEZING: 0

## 2019-07-12 DIAGNOSIS — F90.0 ADHD, PREDOMINANTLY INATTENTIVE TYPE: Chronic | ICD-10-CM

## 2019-07-12 RX ORDER — DEXTROAMPHETAMINE SACCHARATE, AMPHETAMINE ASPARTATE MONOHYDRATE, DEXTROAMPHETAMINE SULFATE AND AMPHETAMINE SULFATE 7.5; 7.5; 7.5; 7.5 MG/1; MG/1; MG/1; MG/1
30 CAPSULE, EXTENDED RELEASE ORAL EVERY MORNING
Qty: 30 CAPSULE | Refills: 0 | Status: SHIPPED | OUTPATIENT
Start: 2019-07-12 | End: 2019-08-15 | Stop reason: SDUPTHER

## 2019-07-12 NOTE — TELEPHONE ENCOUNTER
Medicine Refill Request    Last Office Visit: 6/27/2019  Next Office Visit: 9/27/2019     checked last filled 06/15/2019    Current Outpatient Prescriptions:   •  amphetamine-dextroamphetamine XR (ADDERALL XR) 30 mg 24 hr capsule, Take 1 capsule (30 mg total) by mouth every morning., Disp: 30 capsule, Rfl: 0  •  candesartan-hydrochlorothiazid (ATACAND HCT) 32-12.5 mg per tablet, Take 1 tablet by mouth daily., Disp: 30 tablet, Rfl: 1  •  CRESTOR 10 mg tablet, Take 1 tablet (10 mg total) by mouth once daily., Disp: 90 tablet, Rfl: 3      BP Readings from Last 3 Encounters:   06/27/19 (!) 160/102   04/12/19 124/82   01/09/19 140/90       Recent Lab results:  No results found for: CHOL, No results found for: HDL, No results found for: LDLCALC, No results found for: TRIG     No results found for: GLUCOSE, No results found for: HGBA1C      No results found for: CREATININE    No results found for: TSH

## 2019-07-12 NOTE — TELEPHONE ENCOUNTER
I will send the script but please contact the pt to be certain he is back on his BP  medication and taking it daily. Thnx

## 2019-08-15 DIAGNOSIS — F90.0 ADHD, PREDOMINANTLY INATTENTIVE TYPE: Chronic | ICD-10-CM

## 2019-08-15 RX ORDER — DEXTROAMPHETAMINE SACCHARATE, AMPHETAMINE ASPARTATE MONOHYDRATE, DEXTROAMPHETAMINE SULFATE AND AMPHETAMINE SULFATE 7.5; 7.5; 7.5; 7.5 MG/1; MG/1; MG/1; MG/1
30 CAPSULE, EXTENDED RELEASE ORAL EVERY MORNING
Qty: 30 CAPSULE | Refills: 0 | Status: SHIPPED | OUTPATIENT
Start: 2019-08-15 | End: 2019-09-12 | Stop reason: SDUPTHER

## 2019-08-15 NOTE — TELEPHONE ENCOUNTER
Medicine Refill Request    Last Office Visit: 6/27/2019  Next Office Visit: 9/27/2019     checked last filled 07/13/2019    Current Outpatient Prescriptions:   •  amphetamine-dextroamphetamine XR (ADDERALL XR) 30 mg 24 hr capsule, Take 1 capsule (30 mg total) by mouth every morning., Disp: 30 capsule, Rfl: 0  •  candesartan-hydrochlorothiazid (ATACAND HCT) 32-12.5 mg per tablet, Take 1 tablet by mouth daily., Disp: 30 tablet, Rfl: 1  •  CRESTOR 10 mg tablet, Take 1 tablet (10 mg total) by mouth once daily., Disp: 90 tablet, Rfl: 3      BP Readings from Last 3 Encounters:   06/27/19 (!) 160/102   04/12/19 124/82   01/09/19 140/90       Recent Lab results:  No results found for: CHOL, No results found for: HDL, No results found for: LDLCALC, No results found for: TRIG     No results found for: GLUCOSE, No results found for: HGBA1C      No results found for: CREATININE    No results found for: TSH

## 2019-08-27 DIAGNOSIS — I10 ESSENTIAL HYPERTENSION, BENIGN: ICD-10-CM

## 2019-08-27 RX ORDER — CANDESARTAN CILEXETIL AND HYDROCHLOROTHIAZIDE 32; 12.5 MG/1; MG/1
TABLET ORAL
Qty: 30 TABLET | Refills: 3 | Status: SHIPPED | OUTPATIENT
Start: 2019-08-27 | End: 2020-01-08

## 2019-08-27 NOTE — TELEPHONE ENCOUNTER
Medicine Refill Request    Last Office Visit: 6/27/2019  Next Office Visit: 9/27/2019        Current Outpatient Prescriptions:   •  amphetamine-dextroamphetamine XR (ADDERALL XR) 30 mg 24 hr capsule, Take 1 capsule (30 mg total) by mouth every morning., Disp: 30 capsule, Rfl: 0  •  candesartan-hydrochlorothiazid (ATACAND HCT) 32-12.5 mg per tablet, Take 1 tablet by mouth daily., Disp: 30 tablet, Rfl: 1  •  CRESTOR 10 mg tablet, Take 1 tablet (10 mg total) by mouth once daily., Disp: 90 tablet, Rfl: 3      BP Readings from Last 3 Encounters:   06/27/19 (!) 160/102   04/12/19 124/82   01/09/19 140/90       Recent Lab results:  No results found for: CHOL, No results found for: HDL, No results found for: LDLCALC, No results found for: TRIG     No results found for: GLUCOSE, No results found for: HGBA1C      No results found for: CREATININE    No results found for: TSH

## 2019-09-12 DIAGNOSIS — F90.0 ADHD, PREDOMINANTLY INATTENTIVE TYPE: Chronic | ICD-10-CM

## 2019-09-12 NOTE — TELEPHONE ENCOUNTER
Medicine Refill Request    Last Office Visit: 6/27/2019  Next Office Visit: 9/27/2019   check OK      Current Outpatient Prescriptions:   •  amphetamine-dextroamphetamine XR (ADDERALL XR) 30 mg 24 hr capsule, Take 1 capsule (30 mg total) by mouth every morning., Disp: 30 capsule, Rfl: 0  •  candesartan-hydrochlorothiazid (ATACAND HCT) 32-12.5 mg per tablet, TAKE 1 TABLET BY MOUTH EVERY DAY, Disp: 30 tablet, Rfl: 3  •  CRESTOR 10 mg tablet, Take 1 tablet (10 mg total) by mouth once daily., Disp: 90 tablet, Rfl: 3      BP Readings from Last 3 Encounters:   06/27/19 (!) 160/102   04/12/19 124/82   01/09/19 140/90       Recent Lab results:  No results found for: CHOL, No results found for: HDL, No results found for: LDLCALC, No results found for: TRIG     No results found for: GLUCOSE, No results found for: HGBA1C      No results found for: CREATININE    No results found for: TSH

## 2019-09-13 RX ORDER — DEXTROAMPHETAMINE SACCHARATE, AMPHETAMINE ASPARTATE MONOHYDRATE, DEXTROAMPHETAMINE SULFATE AND AMPHETAMINE SULFATE 7.5; 7.5; 7.5; 7.5 MG/1; MG/1; MG/1; MG/1
30 CAPSULE, EXTENDED RELEASE ORAL EVERY MORNING
Qty: 30 CAPSULE | Refills: 0 | Status: SHIPPED | OUTPATIENT
Start: 2019-09-13 | End: 2019-10-11 | Stop reason: SDUPTHER

## 2019-09-27 ENCOUNTER — OFFICE VISIT (OUTPATIENT)
Dept: PRIMARY CARE | Facility: CLINIC | Age: 54
End: 2019-09-27
Payer: COMMERCIAL

## 2019-09-27 VITALS
HEART RATE: 80 BPM | DIASTOLIC BLOOD PRESSURE: 72 MMHG | OXYGEN SATURATION: 99 % | SYSTOLIC BLOOD PRESSURE: 120 MMHG | HEIGHT: 71 IN | BODY MASS INDEX: 24.36 KG/M2 | WEIGHT: 174 LBS

## 2019-09-27 DIAGNOSIS — J30.9 ACUTE ALLERGIC RHINITIS: Chronic | ICD-10-CM

## 2019-09-27 DIAGNOSIS — F90.0 ADHD, PREDOMINANTLY INATTENTIVE TYPE: Chronic | ICD-10-CM

## 2019-09-27 DIAGNOSIS — I10 ESSENTIAL HYPERTENSION, BENIGN: ICD-10-CM

## 2019-09-27 DIAGNOSIS — Z12.5 SCREENING FOR MALIGNANT NEOPLASM OF PROSTATE: Primary | ICD-10-CM

## 2019-09-27 DIAGNOSIS — Z00.01 ENCOUNTER FOR ROUTINE ADULT HEALTH EXAMINATION WITH ABNORMAL FINDINGS: ICD-10-CM

## 2019-09-27 PROCEDURE — 99214 OFFICE O/P EST MOD 30 MIN: CPT | Performed by: INTERNAL MEDICINE

## 2019-09-27 RX ORDER — LEVOCETIRIZINE DIHYDROCHLORIDE 5 MG/1
5 TABLET, FILM COATED ORAL EVERY EVENING
Qty: 90 TABLET | Refills: 1 | Status: SHIPPED | OUTPATIENT
Start: 2019-09-27

## 2019-09-27 RX ORDER — LEVOCETIRIZINE DIHYDROCHLORIDE 5 MG/1
5 TABLET, FILM COATED ORAL EVERY EVENING
COMMUNITY
End: 2019-09-27 | Stop reason: SDUPTHER

## 2019-09-27 NOTE — PROGRESS NOTES
[facility]  Dr. Jo Chow  [address]  [place] [address]  Phone: [phone]  Fax: [phone]      Patient ID: Riki Bundy                              : 1965    Visit Date: 2019    Chief Complaint: Follow-up      HPI  Stressed with work.  BP has been good.  Notes he has lost weight and feels better. Does attribute the improved BP to this.   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 Now he has decided it is not what he wants and is fixing problems to sell it. Now plans to purchase a home there instead.  No new complaints.  Asking for renewals of medication.  Believes he is due for labs as he did not do those ordered in the spring.        Subjective      Patient ID: Riki Bundy is a 54 y.o. male.    Patient Active Problem List   Diagnosis   • Hypercholesteremia   • Essential hypertension, benign   • Acute allergic rhinitis   • ADHD, predominantly inattentive type   • Panic disorder without agoraphobia   • Displacement of lumbar intervertebral disc without myelopathy   • Acute pain of left knee   • Neck pain   • Sebaceous cyst of right axilla         Current Outpatient Prescriptions:   •  amphetamine-dextroamphetamine XR (ADDERALL XR) 30 mg 24 hr capsule, Take 1 capsule (30 mg total) by mouth every morning., Disp: 30 capsule, Rfl: 0  •  candesartan-hydrochlorothiazid (ATACAND HCT) 32-12.5 mg per tablet, TAKE 1 TABLET BY MOUTH EVERY DAY, Disp: 30 tablet, Rfl: 3  •  CRESTOR 10 mg tablet, Take 1 tablet (10 mg total) by mouth once daily., Disp: 90 tablet, Rfl: 3  •  levocetirizine (XYZAL) 5 mg tablet, Take 1 tablet (5 mg total) by mouth every evening., Disp: 90 tablet, Rfl: 1    Allergies   Allergen Reactions   • Sulfa (Sulfonamide Antibiotics) Other (see comments)     Get fever to the point of fainting   • Cephalosporins Rash       Social History     Social History   • Marital status:      Spouse name:  "N/A   • Number of children: N/A   • Years of education: N/A     Occupational History   • Not on file.     Social History Main Topics   • Smoking status: Never Smoker   • Smokeless tobacco: Never Used   • Alcohol use Not on file   • Drug use: Unknown   • Sexual activity: Not on file     Other Topics Concern   • Not on file     Social History Narrative   • No narrative on file       Health Maintenance   Topic Date Due   • HIV Screening  02/09/1977   • Zoster Vaccine (1 of 2) 02/09/2014   • Influenza Vaccine (1) 12/30/2019 (Originally 8/1/2019)   • DTaP, Tdap, and Td Vaccines (2 - Td) 05/02/2021   • Colonoscopy  09/21/2025   • Meningococcal ACWY  Aged Out   • Varicella Vaccines  Aged Out   • HIB Vaccines  Aged Out   • IPV Vaccines  Aged Out   • HPV Vaccines  Aged Out   • Hepatitis C Screening  Completed   • Pneumococcal  Aged Out       Past Medical History:   Diagnosis Date   • Acute allergic rhinitis    • ADHD (attention deficit hyperactivity disorder), inattentive type    • Displacement of lumbar intervertebral disc without myelopathy    • Essential hypertension, benign    • Hypercholesteremia    • Panic disorder without agoraphobia        The following have been reviewed and updated as appropriate in this visit:  Allergies  Meds  Problems         Review of System  Review of Systems   Respiratory: Negative for cough, shortness of breath and wheezing.    Cardiovascular: Negative for chest pain, palpitations and leg swelling.   Neurological: Negative for dizziness, weakness and headaches.       Objective     Vitals  Vitals:    09/27/19 1131   BP: 120/72   BP Location: Left upper arm   Patient Position: Sitting   Pulse: 80   SpO2: 99%   Weight: 78.9 kg (174 lb)   Height: 1.803 m (5' 11\")       Body mass index is 24.27 kg/m².    Physical Exam  Physical Exam   Constitutional: He is oriented to person, place, and time. He appears well-nourished. No distress.   HENT:   Head: Normocephalic and atraumatic.   Nose: Nose " normal.   Mouth/Throat: Oropharynx is clear and moist.   Eyes: Pupils are equal, round, and reactive to light. Conjunctivae and EOM are normal.   Neck: Normal range of motion. Neck supple. No thyromegaly present.   Cardiovascular: Normal rate, regular rhythm and normal heart sounds.    No murmur heard.  Pulmonary/Chest: Effort normal and breath sounds normal.   Abdominal: Soft. Bowel sounds are normal.   Musculoskeletal: Normal range of motion. He exhibits no edema or deformity.   Lymphadenopathy:     He has no cervical adenopathy.   Neurological: He is alert and oriented to person, place, and time. Coordination normal.   Skin: Skin is warm and dry.   Vitals reviewed.      Assessment/Plan     Problem List Items Addressed This Visit     Essential hypertension, benign    Relevant Orders    Comprehensive metabolic panel    Acute allergic rhinitis (Chronic)    Relevant Medications    levocetirizine (XYZAL) 5 mg tablet    ADHD, predominantly inattentive type (Chronic)    Relevant Medications    levocetirizine (XYZAL) 5 mg tablet      Other Visit Diagnoses     Screening for malignant neoplasm of prostate    -  Primary    Relevant Orders    PSA    Encounter for routine adult health examination with abnormal findings        Relevant Orders    CBC and differential    Lipid panel          There are no Patient Instructions on file for this visit.    No Follow-up on file.      Jo Chow MD  9/29/2019

## 2019-09-29 ASSESSMENT — ENCOUNTER SYMPTOMS
WHEEZING: 0
COUGH: 0
WEAKNESS: 0
DIZZINESS: 0
SHORTNESS OF BREATH: 0
PALPITATIONS: 0
HEADACHES: 0

## 2019-10-11 DIAGNOSIS — F90.0 ADHD, PREDOMINANTLY INATTENTIVE TYPE: Chronic | ICD-10-CM

## 2019-10-11 RX ORDER — DEXTROAMPHETAMINE SACCHARATE, AMPHETAMINE ASPARTATE MONOHYDRATE, DEXTROAMPHETAMINE SULFATE AND AMPHETAMINE SULFATE 7.5; 7.5; 7.5; 7.5 MG/1; MG/1; MG/1; MG/1
30 CAPSULE, EXTENDED RELEASE ORAL EVERY MORNING
Qty: 30 CAPSULE | Refills: 0 | Status: SHIPPED | OUTPATIENT
Start: 2019-10-11 | End: 2019-11-13 | Stop reason: SDUPTHER

## 2019-10-11 NOTE — TELEPHONE ENCOUNTER
Medicine Refill Request    Last Office Visit: 9/27/2019  Next Office Visit: 12/20/2019     check OK    Current Outpatient Prescriptions:   •  amphetamine-dextroamphetamine XR (ADDERALL XR) 30 mg 24 hr capsule, Take 1 capsule (30 mg total) by mouth every morning., Disp: 30 capsule, Rfl: 0  •  candesartan-hydrochlorothiazid (ATACAND HCT) 32-12.5 mg per tablet, TAKE 1 TABLET BY MOUTH EVERY DAY, Disp: 30 tablet, Rfl: 3  •  CRESTOR 10 mg tablet, Take 1 tablet (10 mg total) by mouth once daily., Disp: 90 tablet, Rfl: 3  •  levocetirizine (XYZAL) 5 mg tablet, Take 1 tablet (5 mg total) by mouth every evening., Disp: 90 tablet, Rfl: 1      BP Readings from Last 3 Encounters:   09/27/19 120/72   06/27/19 (!) 160/102   04/12/19 124/82       Recent Lab results:  No results found for: CHOL, No results found for: HDL, No results found for: LDLCALC, No results found for: TRIG     No results found for: GLUCOSE, No results found for: HGBA1C      No results found for: CREATININE    No results found for: TSH

## 2019-11-13 DIAGNOSIS — F90.0 ADHD, PREDOMINANTLY INATTENTIVE TYPE: Chronic | ICD-10-CM

## 2019-11-13 NOTE — TELEPHONE ENCOUNTER
Medicine Refill Request    Last Office Visit: 9/27/2019  Next Office Visit: 12/20/2019     check OK    Current Outpatient Medications:   •  amphetamine-dextroamphetamine XR (ADDERALL XR) 30 mg 24 hr capsule, Take 1 capsule (30 mg total) by mouth every morning., Disp: 30 capsule, Rfl: 0  •  candesartan-hydrochlorothiazid (ATACAND HCT) 32-12.5 mg per tablet, TAKE 1 TABLET BY MOUTH EVERY DAY, Disp: 30 tablet, Rfl: 3  •  CRESTOR 10 mg tablet, Take 1 tablet (10 mg total) by mouth once daily., Disp: 90 tablet, Rfl: 3  •  levocetirizine (XYZAL) 5 mg tablet, Take 1 tablet (5 mg total) by mouth every evening., Disp: 90 tablet, Rfl: 1      BP Readings from Last 3 Encounters:   09/27/19 120/72   06/27/19 (!) 160/102   04/12/19 124/82       Recent Lab results:  No results found for: CHOL, No results found for: HDL, No results found for: LDLCALC, No results found for: TRIG     No results found for: GLUCOSE, No results found for: HGBA1C      No results found for: CREATININE    No results found for: TSH

## 2019-11-14 RX ORDER — DEXTROAMPHETAMINE SACCHARATE, AMPHETAMINE ASPARTATE MONOHYDRATE, DEXTROAMPHETAMINE SULFATE AND AMPHETAMINE SULFATE 7.5; 7.5; 7.5; 7.5 MG/1; MG/1; MG/1; MG/1
30 CAPSULE, EXTENDED RELEASE ORAL EVERY MORNING
Qty: 30 CAPSULE | Refills: 0 | Status: SHIPPED | OUTPATIENT
Start: 2019-11-14 | End: 2019-12-13 | Stop reason: SDUPTHER

## 2019-12-13 DIAGNOSIS — F90.0 ADHD, PREDOMINANTLY INATTENTIVE TYPE: Chronic | ICD-10-CM

## 2019-12-13 RX ORDER — DEXTROAMPHETAMINE SACCHARATE, AMPHETAMINE ASPARTATE MONOHYDRATE, DEXTROAMPHETAMINE SULFATE AND AMPHETAMINE SULFATE 7.5; 7.5; 7.5; 7.5 MG/1; MG/1; MG/1; MG/1
30 CAPSULE, EXTENDED RELEASE ORAL EVERY MORNING
Qty: 30 CAPSULE | Refills: 0 | Status: SHIPPED | OUTPATIENT
Start: 2019-12-13 | End: 2019-12-16 | Stop reason: SDUPTHER

## 2019-12-13 NOTE — TELEPHONE ENCOUNTER
Medicine Refill Request    Last Office Visit: 9/27/2019  Next Office Visit: 12/20/2019  Please call in Adderall XR     TODAY he will be out he has appt schedule 12/20/19  [facility]      Current Outpatient Medications:   •  amphetamine-dextroamphetamine XR (ADDERALL XR) 30 mg 24 hr capsule, Take 1 capsule (30 mg total) by mouth every morning., Disp: 30 capsule, Rfl: 0  •  candesartan-hydrochlorothiazid (ATACAND HCT) 32-12.5 mg per tablet, TAKE 1 TABLET BY MOUTH EVERY DAY, Disp: 30 tablet, Rfl: 3  •  CRESTOR 10 mg tablet, Take 1 tablet (10 mg total) by mouth once daily., Disp: 90 tablet, Rfl: 3  •  levocetirizine (XYZAL) 5 mg tablet, Take 1 tablet (5 mg total) by mouth every evening., Disp: 90 tablet, Rfl: 1      BP Readings from Last 3 Encounters:   09/27/19 120/72   06/27/19 (!) 160/102   04/12/19 124/82       Recent Lab results:  No results found for: CHOL, No results found for: HDL, No results found for: LDLCALC, No results found for: TRIG     No results found for: GLUCOSE, No results found for: HGBA1C      No results found for: CREATININE    No results found for: TSH

## 2019-12-16 RX ORDER — DEXTROAMPHETAMINE SACCHARATE, AMPHETAMINE ASPARTATE MONOHYDRATE, DEXTROAMPHETAMINE SULFATE AND AMPHETAMINE SULFATE 7.5; 7.5; 7.5; 7.5 MG/1; MG/1; MG/1; MG/1
30 CAPSULE, EXTENDED RELEASE ORAL EVERY MORNING
Qty: 30 CAPSULE | Refills: 0 | Status: SHIPPED | OUTPATIENT
Start: 2019-12-16 | End: 2020-02-10 | Stop reason: SDUPTHER

## 2019-12-16 NOTE — TELEPHONE ENCOUNTER
Medicine Refill Request    Last Office Visit: 9/27/2019  Next Office Visit: 12/20/2019        Current Outpatient Medications:   •  amphetamine-dextroamphetamine XR (ADDERALL XR) 30 mg 24 hr capsule, Take 1 capsule (30 mg total) by mouth every morning., Disp: 30 capsule, Rfl: 0  •  candesartan-hydrochlorothiazid (ATACAND HCT) 32-12.5 mg per tablet, TAKE 1 TABLET BY MOUTH EVERY DAY, Disp: 30 tablet, Rfl: 3  •  CRESTOR 10 mg tablet, Take 1 tablet (10 mg total) by mouth once daily., Disp: 90 tablet, Rfl: 3  •  levocetirizine (XYZAL) 5 mg tablet, Take 1 tablet (5 mg total) by mouth every evening., Disp: 90 tablet, Rfl: 1      BP Readings from Last 3 Encounters:   09/27/19 120/72   06/27/19 (!) 160/102   04/12/19 124/82       Recent Lab results:  No results found for: CHOL, No results found for: HDL, No results found for: LDLCALC, No results found for: TRIG     No results found for: GLUCOSE, No results found for: HGBA1C      No results found for: CREATININE    No results found for: TSH

## 2019-12-20 ENCOUNTER — OFFICE VISIT (OUTPATIENT)
Dept: PRIMARY CARE | Facility: CLINIC | Age: 54
End: 2019-12-20
Payer: COMMERCIAL

## 2019-12-20 VITALS
SYSTOLIC BLOOD PRESSURE: 132 MMHG | HEIGHT: 71 IN | WEIGHT: 178 LBS | DIASTOLIC BLOOD PRESSURE: 88 MMHG | BODY MASS INDEX: 24.92 KG/M2 | HEART RATE: 64 BPM

## 2019-12-20 DIAGNOSIS — E78.00 HYPERCHOLESTEREMIA: ICD-10-CM

## 2019-12-20 DIAGNOSIS — I10 ESSENTIAL HYPERTENSION, BENIGN: Primary | ICD-10-CM

## 2019-12-20 DIAGNOSIS — M76.62 ACHILLES TENDINITIS OF LEFT LOWER EXTREMITY: ICD-10-CM

## 2019-12-20 DIAGNOSIS — F90.0 ADHD, PREDOMINANTLY INATTENTIVE TYPE: Chronic | ICD-10-CM

## 2019-12-20 PROCEDURE — 99214 OFFICE O/P EST MOD 30 MIN: CPT | Performed by: INTERNAL MEDICINE

## 2019-12-20 NOTE — PROGRESS NOTES
[facility]  Dr. Jo Chow  [address], [address]  [place] [address]  Phone: [phone]  Fax: [phone]      Patient ID: Riki Bundy                              : 1965    Visit Date: 2019    Chief Complaint: ADD      HPI  No complaints. Does have left heel pain lingering from injury.  Has been busy with selling a vacation home and purchasing another. Has left him stressed and without regular cardiopulmonary fitness. Plans to get back into it.  His work is fine and medications without issue.  Has not done labs that are now overdue.    Hypertension   This is a chronic problem. The current episode started more than 1 year ago. The problem has been gradually improving since onset. The problem is controlled. Pertinent negatives include no chest pain, headaches, malaise/fatigue, palpitations or shortness of breath. Agents associated with hypertension include amphetamines. Risk factors for coronary artery disease include dyslipidemia, male gender and stress. Past treatments include diuretics and angiotensin blockers. The current treatment provides moderate improvement. Compliance problems include exercise.    Hyperlipidemia   This is a chronic problem. The current episode started more than 1 year ago. The problem is controlled. Recent lipid tests were reviewed and are normal. Factors aggravating his hyperlipidemia include thiazides. Pertinent negatives include no chest pain or shortness of breath. Current antihyperlipidemic treatment includes statins. The current treatment provides significant improvement of lipids. Compliance problems include adherence to exercise.        Subjective      Patient ID: Riki Bundy is a 54 y.o. male.    Patient Active Problem List   Diagnosis   • Hypercholesteremia   • Essential hypertension, benign   • Acute allergic rhinitis   • ADHD, predominantly inattentive type   • Panic disorder without agoraphobia   •  Displacement of lumbar intervertebral disc without myelopathy   • Acute pain of left knee   • Neck pain   • Sebaceous cyst of right axilla   • Achilles tendinitis of left lower extremity         Current Outpatient Medications:   •  amphetamine-dextroamphetamine XR (ADDERALL XR) 30 mg 24 hr capsule, Take 1 capsule (30 mg total) by mouth every morning., Disp: 30 capsule, Rfl: 0  •  candesartan-hydrochlorothiazid (ATACAND HCT) 32-12.5 mg per tablet, TAKE 1 TABLET BY MOUTH EVERY DAY, Disp: 30 tablet, Rfl: 3  •  CRESTOR 10 mg tablet, Take 1 tablet (10 mg total) by mouth once daily., Disp: 90 tablet, Rfl: 3  •  levocetirizine (XYZAL) 5 mg tablet, Take 1 tablet (5 mg total) by mouth every evening., Disp: 90 tablet, Rfl: 1    Allergies   Allergen Reactions   • Sulfa (Sulfonamide Antibiotics) Other (see comments)     Get fever to the point of fainting   • Cephalosporins Rash       Social History     Socioeconomic History   • Marital status:      Spouse name: Not on file   • Number of children: Not on file   • Years of education: Not on file   • Highest education level: Not on file   Occupational History   • Not on file   Social Needs   • Financial resource strain: Not on file   • Food insecurity:     Worry: Not on file     Inability: Not on file   • Transportation needs:     Medical: Not on file     Non-medical: Not on file   Tobacco Use   • Smoking status: Never Smoker   • Smokeless tobacco: Never Used   Substance and Sexual Activity   • Alcohol use: Not on file   • Drug use: Not on file   • Sexual activity: Not on file   Lifestyle   • Physical activity:     Days per week: Not on file     Minutes per session: Not on file   • Stress: Not on file   Relationships   • Social connections:     Talks on phone: Not on file     Gets together: Not on file     Attends Christian service: Not on file     Active member of club or organization: Not on file     Attends meetings of clubs or organizations: Not on file     Relationship  "status: Not on file   • Intimate partner violence:     Fear of current or ex partner: Not on file     Emotionally abused: Not on file     Physically abused: Not on file     Forced sexual activity: Not on file   Other Topics Concern   • Not on file   Social History Narrative   • Not on file       Health Maintenance   Topic Date Due   • HIV Screening  02/09/1977   • Influenza Vaccine (1) 12/30/2019 (Originally 8/1/2019)   • Zoster Vaccine (1 of 2) 12/18/2020 (Originally 2/9/2014)   • DTaP, Tdap, and Td Vaccines (2 - Td) 05/02/2021   • Colonoscopy  09/21/2025   • Hepatitis C Screening  Completed   • Meningococcal ACWY  Aged Out   • Varicella Vaccines  Aged Out   • HIB Vaccines  Aged Out   • IPV Vaccines  Aged Out   • HPV Vaccines  Aged Out   • Pneumococcal  Aged Out       Past Medical History:   Diagnosis Date   • Acute allergic rhinitis    • ADHD (attention deficit hyperactivity disorder), inattentive type    • Displacement of lumbar intervertebral disc without myelopathy    • Essential hypertension, benign    • Hypercholesteremia    • Panic disorder without agoraphobia        The following have been reviewed and updated as appropriate in this visit:  Allergies  Meds  Problems         Review of System  Review of Systems   Constitutional: Negative for malaise/fatigue.   Respiratory: Negative for shortness of breath.    Cardiovascular: Negative for chest pain and palpitations.   Musculoskeletal: Positive for gait problem (left heel pain with extension. Injured while working on his property. Aggravating).   Neurological: Negative for headaches.       Objective     Vitals  Vitals:    12/20/19 1130 12/20/19 1153   BP: (!) 148/80 132/88   BP Location: Left upper arm    Patient Position: Sitting    Pulse: 64    Weight: 80.7 kg (178 lb)    Height: 1.803 m (5' 11\")        Body mass index is 24.83 kg/m².    Physical Exam  Physical Exam   Constitutional: He is oriented to person, place, and time. He appears well-developed and " well-nourished. No distress.   HENT:   Head: Normocephalic and atraumatic.   Right Ear: External ear normal.   Mouth/Throat: Oropharynx is clear and moist.   Eyes: Pupils are equal, round, and reactive to light. Conjunctivae and EOM are normal.   Neck: Normal range of motion. Neck supple. Carotid bruit is not present.   Cardiovascular: Normal rate, regular rhythm and normal heart sounds. Exam reveals no gallop.   No murmur heard.  Pulmonary/Chest: Effort normal and breath sounds normal.   Abdominal: Soft. He exhibits no distension. There is no tenderness.   Musculoskeletal: Normal range of motion. He exhibits no edema or deformity.   Neurological: He is alert and oriented to person, place, and time. Coordination normal.   Skin: Skin is warm and dry.   Psychiatric: He has a normal mood and affect. Thought content normal.   Vitals reviewed.      Assessment/Plan     Problem List Items Addressed This Visit        Circulatory    Essential hypertension, benign - Primary     Pt believes his BP is usually fine.  Will keep weight down and exercise more regularly once vacation home situation is cleared.            Musculoskeletal    Achilles tendinitis of left lower extremity     Discussed the nature of the condition at length.  To avoid NSAID use given elevated BP.  Should the symptoms fail to improve over the holidays, the pt will consider follow up with an orthopedist or PT.            Endocrine/Metabolic    Hypercholesteremia     Compliant with statin.  May need to hold this for a while if the tendonitis lingers.            Other    ADHD, predominantly inattentive type (Chronic)     Stable with current therapy.           Pt promises to get his labs done in the coming weeks.    There are no Patient Instructions on file for this visit.    Return in about 6 months (around 6/20/2020), or if symptoms worsen or fail to improve.      Jo Chow MD  12/27/2019

## 2019-12-27 ASSESSMENT — ENCOUNTER SYMPTOMS
SHORTNESS OF BREATH: 0
HYPERTENSION: 1
HEADACHES: 0
PALPITATIONS: 0

## 2019-12-27 NOTE — ASSESSMENT & PLAN NOTE
Pt believes his BP is usually fine.  Will keep weight down and exercise more regularly once vacation home situation is cleared.

## 2019-12-27 NOTE — ASSESSMENT & PLAN NOTE
Discussed the nature of the condition at length.  To avoid NSAID use given elevated BP.  Should the symptoms fail to improve over the holidays, the pt will consider follow up with an orthopedist or PT.

## 2020-01-08 DIAGNOSIS — I10 ESSENTIAL HYPERTENSION, BENIGN: ICD-10-CM

## 2020-01-08 RX ORDER — CANDESARTAN CILEXETIL AND HYDROCHLOROTHIAZIDE 32; 12.5 MG/1; MG/1
TABLET ORAL
Qty: 30 TABLET | Refills: 3 | Status: SHIPPED | OUTPATIENT
Start: 2020-01-08 | End: 2020-04-22 | Stop reason: SDUPTHER

## 2020-01-08 NOTE — TELEPHONE ENCOUNTER
Medicine Refill Request    Last Office Visit: 12/20/2019  Next Office Visit: Visit date not found        Current Outpatient Medications:   •  amphetamine-dextroamphetamine XR (ADDERALL XR) 30 mg 24 hr capsule, Take 1 capsule (30 mg total) by mouth every morning., Disp: 30 capsule, Rfl: 0  •  candesartan-hydrochlorothiazid (ATACAND HCT) 32-12.5 mg per tablet, TAKE 1 TABLET BY MOUTH EVERY DAY, Disp: 30 tablet, Rfl: 3  •  CRESTOR 10 mg tablet, Take 1 tablet (10 mg total) by mouth once daily., Disp: 90 tablet, Rfl: 3  •  levocetirizine (XYZAL) 5 mg tablet, Take 1 tablet (5 mg total) by mouth every evening., Disp: 90 tablet, Rfl: 1      BP Readings from Last 3 Encounters:   12/20/19 132/88   09/27/19 120/72   06/27/19 (!) 160/102       Recent Lab results:  No results found for: CHOL, No results found for: HDL, No results found for: LDLCALC, No results found for: TRIG     No results found for: GLUCOSE, No results found for: HGBA1C      No results found for: CREATININE    No results found for: TSH

## 2020-02-05 LAB
ALBUMIN SERPL-MCNC: 4.3 G/DL (ref 3.6–5.1)
ALBUMIN/GLOB SERPL: 1.7 (CALC) (ref 1–2.5)
ALP SERPL-CCNC: 69 U/L (ref 35–144)
ALT SERPL-CCNC: 24 U/L (ref 9–46)
AST SERPL-CCNC: 17 U/L (ref 10–35)
BASOPHILS # BLD AUTO: 39 CELLS/UL (ref 0–200)
BASOPHILS NFR BLD AUTO: 0.9 %
BILIRUB SERPL-MCNC: 0.4 MG/DL (ref 0.2–1.2)
BUN SERPL-MCNC: 18 MG/DL (ref 7–25)
BUN/CREAT SERPL: ABNORMAL (CALC) (ref 6–22)
CALCIUM SERPL-MCNC: 8.8 MG/DL (ref 8.6–10.3)
CHLORIDE SERPL-SCNC: 98 MMOL/L (ref 98–110)
CHOLEST SERPL-MCNC: 134 MG/DL
CHOLEST/HDLC SERPL: 2.8 (CALC)
CO2 SERPL-SCNC: 31 MMOL/L (ref 20–32)
CREAT SERPL-MCNC: 0.96 MG/DL (ref 0.7–1.33)
EOSINOPHIL # BLD AUTO: 69 CELLS/UL (ref 15–500)
EOSINOPHIL NFR BLD AUTO: 1.6 %
ERYTHROCYTE [DISTWIDTH] IN BLOOD BY AUTOMATED COUNT: 12.1 % (ref 11–15)
GLOBULIN SER CALC-MCNC: 2.5 G/DL (CALC) (ref 1.9–3.7)
GLUCOSE SERPL-MCNC: 112 MG/DL (ref 65–99)
HCT VFR BLD AUTO: 39.8 % (ref 38.5–50)
HDLC SERPL-MCNC: 48 MG/DL
HGB BLD-MCNC: 12.9 G/DL (ref 13.2–17.1)
LDLC SERPL CALC-MCNC: 69 MG/DL (CALC)
LYMPHOCYTES # BLD AUTO: 903 CELLS/UL (ref 850–3900)
LYMPHOCYTES NFR BLD AUTO: 21 %
MCH RBC QN AUTO: 28.3 PG (ref 27–33)
MCHC RBC AUTO-ENTMCNC: 32.4 G/DL (ref 32–36)
MCV RBC AUTO: 87.3 FL (ref 80–100)
MONOCYTES # BLD AUTO: 512 CELLS/UL (ref 200–950)
MONOCYTES NFR BLD AUTO: 11.9 %
NEUTROPHILS # BLD AUTO: 2778 CELLS/UL (ref 1500–7800)
NEUTROPHILS NFR BLD AUTO: 64.6 %
NONHDLC SERPL-MCNC: 86 MG/DL (CALC)
PLATELET # BLD AUTO: 288 THOUSAND/UL (ref 140–400)
PMV BLD REES-ECKER: 9.4 FL (ref 7.5–12.5)
POTASSIUM SERPL-SCNC: 4.2 MMOL/L (ref 3.5–5.3)
PROT SERPL-MCNC: 6.8 G/DL (ref 6.1–8.1)
PSA SERPL-MCNC: 0.9 NG/ML
QUEST EGFR NON-AFR. AMERICAN: 89 ML/MIN/1.73M2
RBC # BLD AUTO: 4.56 MILLION/UL (ref 4.2–5.8)
SODIUM SERPL-SCNC: 134 MMOL/L (ref 135–146)
TRIGL SERPL-MCNC: 90 MG/DL
WBC # BLD AUTO: 4.3 THOUSAND/UL (ref 3.8–10.8)

## 2020-02-06 ENCOUNTER — TELEPHONE (OUTPATIENT)
Dept: PRIMARY CARE | Facility: CLINIC | Age: 55
End: 2020-02-06

## 2020-02-06 DIAGNOSIS — D50.9 IRON DEFICIENCY ANEMIA, UNSPECIFIED IRON DEFICIENCY ANEMIA TYPE: Primary | ICD-10-CM

## 2020-02-06 NOTE — TELEPHONE ENCOUNTER
----- Message from Jo Chow MD sent at 2/5/2020  6:43 PM EST -----  Please let the pt know that his labs are all fine except he has a very mild anemia. Uncertain why, but would like the pt to repeat the CBC in one month. THx

## 2020-02-10 DIAGNOSIS — F90.0 ADHD, PREDOMINANTLY INATTENTIVE TYPE: Chronic | ICD-10-CM

## 2020-02-10 RX ORDER — DEXTROAMPHETAMINE SACCHARATE, AMPHETAMINE ASPARTATE MONOHYDRATE, DEXTROAMPHETAMINE SULFATE AND AMPHETAMINE SULFATE 7.5; 7.5; 7.5; 7.5 MG/1; MG/1; MG/1; MG/1
30 CAPSULE, EXTENDED RELEASE ORAL EVERY MORNING
Qty: 30 CAPSULE | Refills: 0 | Status: SHIPPED | OUTPATIENT
Start: 2020-02-10 | End: 2020-03-10 | Stop reason: SDUPTHER

## 2020-02-10 NOTE — TELEPHONE ENCOUNTER
Medicine Refill Request    Last Office Visit: 12/20/2019  Next Office Visit: Visit date not found     check OK    Current Outpatient Medications:   •  amphetamine-dextroamphetamine XR (ADDERALL XR) 30 mg 24 hr capsule, Take 1 capsule (30 mg total) by mouth every morning., Disp: 30 capsule, Rfl: 0  •  candesartan-hydrochlorothiazid (ATACAND HCT) 32-12.5 mg per tablet, TAKE 1 TABLET BY MOUTH EVERY DAY, Disp: 30 tablet, Rfl: 3  •  CRESTOR 10 mg tablet, Take 1 tablet (10 mg total) by mouth once daily., Disp: 90 tablet, Rfl: 3  •  levocetirizine (XYZAL) 5 mg tablet, Take 1 tablet (5 mg total) by mouth every evening., Disp: 90 tablet, Rfl: 1      BP Readings from Last 3 Encounters:   12/20/19 132/88   09/27/19 120/72   06/27/19 (!) 160/102       Recent Lab results:  Lab Results   Component Value Date    CHOL 134 02/04/2020   ,   Lab Results   Component Value Date    HDL 48 02/04/2020   ,   Lab Results   Component Value Date    LDLCALC 69 02/04/2020   ,   Lab Results   Component Value Date    TRIG 90 02/04/2020        Lab Results   Component Value Date    GLUCOSE 112 (H) 02/04/2020   , No results found for: HGBA1C      Lab Results   Component Value Date    CREATININE 0.96 02/04/2020       No results found for: TSH

## 2020-03-10 DIAGNOSIS — F90.0 ADHD, PREDOMINANTLY INATTENTIVE TYPE: Chronic | ICD-10-CM

## 2020-03-11 RX ORDER — DEXTROAMPHETAMINE SACCHARATE, AMPHETAMINE ASPARTATE MONOHYDRATE, DEXTROAMPHETAMINE SULFATE AND AMPHETAMINE SULFATE 7.5; 7.5; 7.5; 7.5 MG/1; MG/1; MG/1; MG/1
30 CAPSULE, EXTENDED RELEASE ORAL EVERY MORNING
Qty: 30 CAPSULE | Refills: 0 | Status: SHIPPED | OUTPATIENT
Start: 2020-03-11 | End: 2020-04-08 | Stop reason: SDUPTHER

## 2020-03-11 NOTE — TELEPHONE ENCOUNTER
Medicine Refill Request    Last Office Visit: 12/20/2019  Next Office Visit: Visit date not found    Per  filled 02/10    Current Outpatient Medications:   •  amphetamine-dextroamphetamine XR (ADDERALL XR) 30 mg 24 hr capsule, Take 1 capsule (30 mg total) by mouth every morning., Disp: 30 capsule, Rfl: 0  •  candesartan-hydrochlorothiazid (ATACAND HCT) 32-12.5 mg per tablet, TAKE 1 TABLET BY MOUTH EVERY DAY, Disp: 30 tablet, Rfl: 3  •  CRESTOR 10 mg tablet, Take 1 tablet (10 mg total) by mouth once daily., Disp: 90 tablet, Rfl: 3  •  levocetirizine (XYZAL) 5 mg tablet, Take 1 tablet (5 mg total) by mouth every evening., Disp: 90 tablet, Rfl: 1      BP Readings from Last 3 Encounters:   12/20/19 132/88   09/27/19 120/72   06/27/19 (!) 160/102       Recent Lab results:  Lab Results   Component Value Date    CHOL 134 02/04/2020   ,   Lab Results   Component Value Date    HDL 48 02/04/2020   ,   Lab Results   Component Value Date    LDLCALC 69 02/04/2020   ,   Lab Results   Component Value Date    TRIG 90 02/04/2020        Lab Results   Component Value Date    GLUCOSE 112 (H) 02/04/2020   , No results found for: HGBA1C      Lab Results   Component Value Date    CREATININE 0.96 02/04/2020       No results found for: TSH

## 2020-04-08 DIAGNOSIS — F90.0 ADHD, PREDOMINANTLY INATTENTIVE TYPE: Chronic | ICD-10-CM

## 2020-04-08 RX ORDER — DEXTROAMPHETAMINE SACCHARATE, AMPHETAMINE ASPARTATE MONOHYDRATE, DEXTROAMPHETAMINE SULFATE AND AMPHETAMINE SULFATE 7.5; 7.5; 7.5; 7.5 MG/1; MG/1; MG/1; MG/1
30 CAPSULE, EXTENDED RELEASE ORAL EVERY MORNING
Qty: 30 CAPSULE | Refills: 0 | Status: SHIPPED | OUTPATIENT
Start: 2020-04-08 | End: 2020-05-13 | Stop reason: SDUPTHER

## 2020-04-22 DIAGNOSIS — I10 ESSENTIAL HYPERTENSION, BENIGN: ICD-10-CM

## 2020-04-22 RX ORDER — CANDESARTAN CILEXETIL AND HYDROCHLOROTHIAZIDE 32; 12.5 MG/1; MG/1
1 TABLET ORAL
Qty: 30 TABLET | Refills: 1 | Status: SHIPPED | OUTPATIENT
Start: 2020-04-22 | End: 2020-06-05 | Stop reason: SDUPTHER

## 2020-04-22 NOTE — TELEPHONE ENCOUNTER
Medicine Refill Request    Last Office Visit: 12/20/2019  Next Office Visit: Visit date not found        Current Outpatient Medications:   •  amphetamine-dextroamphetamine XR (ADDERALL XR) 30 mg 24 hr capsule, Take 1 capsule (30 mg total) by mouth every morning., Disp: 30 capsule, Rfl: 0  •  candesartan-hydrochlorothiazid (ATACAND HCT) 32-12.5 mg per tablet, TAKE 1 TABLET BY MOUTH EVERY DAY, Disp: 30 tablet, Rfl: 3  •  CRESTOR 10 mg tablet, Take 1 tablet (10 mg total) by mouth once daily., Disp: 90 tablet, Rfl: 3  •  levocetirizine (XYZAL) 5 mg tablet, Take 1 tablet (5 mg total) by mouth every evening., Disp: 90 tablet, Rfl: 1      BP Readings from Last 3 Encounters:   12/20/19 132/88   09/27/19 120/72   06/27/19 (!) 160/102       Recent Lab results:  Lab Results   Component Value Date    CHOL 134 02/04/2020   ,   Lab Results   Component Value Date    HDL 48 02/04/2020   ,   Lab Results   Component Value Date    LDLCALC 69 02/04/2020   ,   Lab Results   Component Value Date    TRIG 90 02/04/2020        Lab Results   Component Value Date    GLUCOSE 112 (H) 02/04/2020   , No results found for: HGBA1C      Lab Results   Component Value Date    CREATININE 0.96 02/04/2020       No results found for: TSH

## 2020-05-13 DIAGNOSIS — F90.0 ADHD, PREDOMINANTLY INATTENTIVE TYPE: Chronic | ICD-10-CM

## 2020-05-13 RX ORDER — DEXTROAMPHETAMINE SACCHARATE, AMPHETAMINE ASPARTATE MONOHYDRATE, DEXTROAMPHETAMINE SULFATE AND AMPHETAMINE SULFATE 7.5; 7.5; 7.5; 7.5 MG/1; MG/1; MG/1; MG/1
30 CAPSULE, EXTENDED RELEASE ORAL EVERY MORNING
Qty: 30 CAPSULE | Refills: 0 | Status: SHIPPED | OUTPATIENT
Start: 2020-05-13 | End: 2020-06-05 | Stop reason: SDUPTHER

## 2020-05-13 NOTE — TELEPHONE ENCOUNTER
Medicine Refill Request    Last Office Visit: 12/20/2019  Last Telemedicine Visit: Visit date not found    Next Office Visit: Visit date not found  Next Telemedicine Visit: Visit date not found     : last filled 3/11/20 #30    Current Outpatient Medications:   •  amphetamine-dextroamphetamine XR (ADDERALL XR) 30 mg 24 hr capsule, Take 1 capsule (30 mg total) by mouth every morning., Disp: 30 capsule, Rfl: 0  •  candesartan-hydrochlorothiazid (ATACAND HCT) 32-12.5 mg per tablet, Take 1 tablet by mouth once daily., Disp: 30 tablet, Rfl: 1  •  CRESTOR 10 mg tablet, Take 1 tablet (10 mg total) by mouth once daily., Disp: 90 tablet, Rfl: 3  •  levocetirizine (XYZAL) 5 mg tablet, Take 1 tablet (5 mg total) by mouth every evening., Disp: 90 tablet, Rfl: 1      BP Readings from Last 3 Encounters:   12/20/19 132/88   09/27/19 120/72   06/27/19 (!) 160/102       Recent Lab results:  Lab Results   Component Value Date    CHOL 134 02/04/2020   ,   Lab Results   Component Value Date    HDL 48 02/04/2020   ,   Lab Results   Component Value Date    LDLCALC 69 02/04/2020   ,   Lab Results   Component Value Date    TRIG 90 02/04/2020        Lab Results   Component Value Date    GLUCOSE 112 (H) 02/04/2020   , No results found for: HGBA1C      Lab Results   Component Value Date    CREATININE 0.96 02/04/2020       No results found for: TSH

## 2020-06-05 ENCOUNTER — TELEPHONE (OUTPATIENT)
Dept: PRIMARY CARE | Facility: CLINIC | Age: 55
End: 2020-06-05

## 2020-06-05 ENCOUNTER — TELEMEDICINE (OUTPATIENT)
Dept: PRIMARY CARE | Facility: CLINIC | Age: 55
End: 2020-06-05
Payer: COMMERCIAL

## 2020-06-05 DIAGNOSIS — M19.91 PRIMARY OSTEOARTHRITIS, UNSPECIFIED SITE: ICD-10-CM

## 2020-06-05 DIAGNOSIS — I10 ESSENTIAL HYPERTENSION, BENIGN: Primary | ICD-10-CM

## 2020-06-05 DIAGNOSIS — E78.00 HYPERCHOLESTEREMIA: ICD-10-CM

## 2020-06-05 DIAGNOSIS — R79.89 ABNORMAL CBC: ICD-10-CM

## 2020-06-05 DIAGNOSIS — F90.0 ADHD, PREDOMINANTLY INATTENTIVE TYPE: Chronic | ICD-10-CM

## 2020-06-05 PROCEDURE — 99443 PR PHYS/QHP TELEPHONE EVALUATION 21-30 MIN: CPT | Performed by: INTERNAL MEDICINE

## 2020-06-05 RX ORDER — NAPROXEN 500 MG/1
500 TABLET ORAL 2 TIMES DAILY WITH MEALS
Qty: 60 TABLET | Refills: 1 | Status: SHIPPED | OUTPATIENT
Start: 2020-06-05 | End: 2022-01-26 | Stop reason: SDUPTHER

## 2020-06-05 RX ORDER — CANDESARTAN CILEXETIL AND HYDROCHLOROTHIAZIDE 32; 12.5 MG/1; MG/1
1 TABLET ORAL
Qty: 90 TABLET | Refills: 1 | Status: SHIPPED | OUTPATIENT
Start: 2020-06-05 | End: 2021-01-26 | Stop reason: SDUPTHER

## 2020-06-05 RX ORDER — ROSUVASTATIN CALCIUM 10 MG/1
10 TABLET, FILM COATED ORAL
Qty: 90 TABLET | Refills: 3 | Status: SHIPPED | OUTPATIENT
Start: 2020-06-05 | End: 2021-06-02 | Stop reason: SDUPTHER

## 2020-06-05 RX ORDER — DEXTROAMPHETAMINE SACCHARATE, AMPHETAMINE ASPARTATE MONOHYDRATE, DEXTROAMPHETAMINE SULFATE AND AMPHETAMINE SULFATE 7.5; 7.5; 7.5; 7.5 MG/1; MG/1; MG/1; MG/1
30 CAPSULE, EXTENDED RELEASE ORAL EVERY MORNING
Qty: 30 CAPSULE | Refills: 0 | Status: SHIPPED | OUTPATIENT
Start: 2020-06-05 | End: 2020-07-13 | Stop reason: SDUPTHER

## 2020-06-05 ASSESSMENT — ENCOUNTER SYMPTOMS
ACTIVITY CHANGE: 0
COUGH: 0
HEADACHES: 0
UNEXPECTED WEIGHT CHANGE: 0
WHEEZING: 0
SHORTNESS OF BREATH: 0
ARTHRALGIAS: 1
DIZZINESS: 0
PALPITATIONS: 0

## 2020-06-05 NOTE — PROGRESS NOTES
Verification of Patient Location:  The patient affirms they are currently located in the following state: [place]    Request for Consent:   Audio Only Encounter   You and I are about to have a telemedicine check-in or visit. This is allowed because you have requested it. This telemedicine visit will be billed to your health insurance or you, if you are self-insured. You understand you will be responsible for any copayments or coinsurances that apply to your telemedicine visit. Before starting our telemedicine visit, I am required to get your consent for this virtual check-in or visit by telemedicine. Do you consent?    Patient Response to Request for Consent:  Yes      Visit Documentation:  Subjective     Patient ID: Riki Bundy is a 55 y.o. male.  1965  Patient Active Problem List   Diagnosis   • Hypercholesteremia   • Essential hypertension, benign   • Acute allergic rhinitis   • ADHD, predominantly inattentive type   • Panic disorder without agoraphobia   • Displacement of lumbar intervertebral disc without myelopathy   • Acute pain of left knee   • Neck pain   • Sebaceous cyst of right axilla   • Achilles tendinitis of left lower extremity   • Primary osteoarthritis   • Abnormal CBC     Current Outpatient Medications on File Prior to Visit   Medication Sig Dispense Refill   • levocetirizine (XYZAL) 5 mg tablet Take 1 tablet (5 mg total) by mouth every evening. 90 tablet 1     No current facility-administered medications on file prior to visit.      Allergies as of 06/05/2020 - Reviewed 06/05/2020   Allergen Reaction Noted   • Sulfa (sulfonamide antibiotics) Other (see comments) 09/19/2018   • Cephalosporins Rash 09/19/2018     Past Medical History:   Diagnosis Date   • Acute allergic rhinitis    • ADHD (attention deficit hyperactivity disorder), inattentive type    • Displacement of lumbar intervertebral disc without myelopathy    • Essential hypertension, benign    • Hypercholesteremia    •  Panic disorder without agoraphobia      CC: medication check    HPI:  Spending most of the time in [place], [place]. Just up to [place] for the first time since February.  Able to work remotely so has been in [place] where he just bought a new home.  No complaints and has scripts, he thinks.  Ran low on the naproxen.  Pt has not taken his blood pressure but feels fine.  Aware he did not do the one lab ordered in February. Knows it is because his Hb was a little low.      The following have been reviewed and updated as appropriate in this visit:  Allergies  Meds  Problems       Review of Systems   Constitutional: Negative for activity change and unexpected weight change (but has gained weight since his last visit.).   Respiratory: Negative for cough, shortness of breath and wheezing.    Cardiovascular: Negative for chest pain and palpitations.   Musculoskeletal: Positive for arthralgias.   Neurological: Negative for dizziness and headaches.     Labs  2/4/2020 reviewed    Assessment/Plan   Diagnoses and all orders for this visit:    Essential hypertension, benign (Primary)  -     candesartan-hydrochlorothiazid (ATACAND HCT) 32-12.5 mg per tablet; Take 1 tablet by mouth once daily.    ADHD, predominantly inattentive type  -     amphetamine-dextroamphetamine XR (ADDERALL XR) 30 mg 24 hr capsule; Take 1 capsule (30 mg total) by mouth every morning.    Primary osteoarthritis, unspecified site  -     naproxen (NAPROSYN) 500 mg tablet; Take 1 tablet (500 mg total) by mouth 2 (two) times a day with meals.    Hypercholesteremia  -     CRESTOR 10 mg tablet; Take 1 tablet (10 mg total) by mouth once daily.    Abnormal CBC  Assessment & Plan:  The CBC in February showed slight anemia. Pt has not repeated but states he intends to. Will mail an order again as a reminder.    Orders:  -     CBC and differential; Future    Follow up 6 months.    Time Spent in Medical Discussion During This Encounter:      Total encounter time, with >50  percent spent counseling/coordinatin minutes

## 2020-06-05 NOTE — ASSESSMENT & PLAN NOTE
The CBC in February showed slight anemia. Pt has not repeated but states he intends to. Will mail an order again as a reminder.

## 2020-07-13 DIAGNOSIS — F90.0 ADHD, PREDOMINANTLY INATTENTIVE TYPE: Chronic | ICD-10-CM

## 2020-07-13 RX ORDER — DEXTROAMPHETAMINE SACCHARATE, AMPHETAMINE ASPARTATE MONOHYDRATE, DEXTROAMPHETAMINE SULFATE AND AMPHETAMINE SULFATE 7.5; 7.5; 7.5; 7.5 MG/1; MG/1; MG/1; MG/1
30 CAPSULE, EXTENDED RELEASE ORAL EVERY MORNING
Qty: 30 CAPSULE | Refills: 0 | Status: SHIPPED | OUTPATIENT
Start: 2020-07-13 | End: 2020-08-12 | Stop reason: SDUPTHER

## 2020-07-13 NOTE — TELEPHONE ENCOUNTER
Medicine Refill Request    Last Office Visit: 12/20/2019  Last Telemedicine Visit: 6/5/2020 Jo Chow MD    Next Office Visit: Visit date not found  Next Telemedicine Visit: Visit date not found         Current Outpatient Medications:   •  amphetamine-dextroamphetamine XR (ADDERALL XR) 30 mg 24 hr capsule, Take 1 capsule (30 mg total) by mouth every morning., Disp: 30 capsule, Rfl: 0  •  candesartan-hydrochlorothiazid (ATACAND HCT) 32-12.5 mg per tablet, Take 1 tablet by mouth once daily., Disp: 90 tablet, Rfl: 1  •  CRESTOR 10 mg tablet, Take 1 tablet (10 mg total) by mouth once daily., Disp: 90 tablet, Rfl: 3  •  levocetirizine (XYZAL) 5 mg tablet, Take 1 tablet (5 mg total) by mouth every evening., Disp: 90 tablet, Rfl: 1  •  naproxen (NAPROSYN) 500 mg tablet, Take 1 tablet (500 mg total) by mouth 2 (two) times a day with meals., Disp: 60 tablet, Rfl: 1      BP Readings from Last 3 Encounters:   12/20/19 132/88   09/27/19 120/72   06/27/19 (!) 160/102       Recent Lab results:  Lab Results   Component Value Date    CHOL 134 02/04/2020   ,   Lab Results   Component Value Date    HDL 48 02/04/2020   ,   Lab Results   Component Value Date    LDLCALC 69 02/04/2020   ,   Lab Results   Component Value Date    TRIG 90 02/04/2020        Lab Results   Component Value Date    GLUCOSE 112 (H) 02/04/2020   , No results found for: HGBA1C      Lab Results   Component Value Date    CREATININE 0.96 02/04/2020       No results found for: TSH

## 2020-08-12 DIAGNOSIS — F90.0 ADHD, PREDOMINANTLY INATTENTIVE TYPE: Chronic | ICD-10-CM

## 2020-08-12 RX ORDER — DEXTROAMPHETAMINE SACCHARATE, AMPHETAMINE ASPARTATE MONOHYDRATE, DEXTROAMPHETAMINE SULFATE AND AMPHETAMINE SULFATE 7.5; 7.5; 7.5; 7.5 MG/1; MG/1; MG/1; MG/1
30 CAPSULE, EXTENDED RELEASE ORAL EVERY MORNING
Qty: 30 CAPSULE | Refills: 0 | Status: SHIPPED | OUTPATIENT
Start: 2020-08-12 | End: 2020-09-12 | Stop reason: SDUPTHER

## 2020-09-09 DIAGNOSIS — F90.0 ADHD, PREDOMINANTLY INATTENTIVE TYPE: Chronic | ICD-10-CM

## 2020-09-09 RX ORDER — DEXTROAMPHETAMINE SACCHARATE, AMPHETAMINE ASPARTATE MONOHYDRATE, DEXTROAMPHETAMINE SULFATE AND AMPHETAMINE SULFATE 7.5; 7.5; 7.5; 7.5 MG/1; MG/1; MG/1; MG/1
30 CAPSULE, EXTENDED RELEASE ORAL EVERY MORNING
Qty: 30 CAPSULE | Refills: 0 | OUTPATIENT
Start: 2020-09-09

## 2020-09-11 NOTE — TELEPHONE ENCOUNTER
He had a telemed 6/2020 with Dr Chow and suppose to f/up in 6 mos. Can his rx be sent? Please let him know

## 2020-09-11 NOTE — TELEPHONE ENCOUNTER
Pt called to check status of Rx request.  Pt is out of medication.    Note mentioned pt was seen in June and appt note stated to follow up in 6 months.  Please advise if appt is needed now or Dec.

## 2020-09-12 RX ORDER — DEXTROAMPHETAMINE SACCHARATE, AMPHETAMINE ASPARTATE MONOHYDRATE, DEXTROAMPHETAMINE SULFATE AND AMPHETAMINE SULFATE 7.5; 7.5; 7.5; 7.5 MG/1; MG/1; MG/1; MG/1
30 CAPSULE, EXTENDED RELEASE ORAL EVERY MORNING
Qty: 30 CAPSULE | Refills: 0 | Status: SHIPPED | OUTPATIENT
Start: 2020-09-12 | End: 2020-10-09 | Stop reason: SDUPTHER

## 2020-09-12 NOTE — TELEPHONE ENCOUNTER
Pt does need appointment now Geraldo Calvillo retired and we require pts to be seen every 3 months we are sending the meds

## 2020-10-07 DIAGNOSIS — F90.0 ADHD, PREDOMINANTLY INATTENTIVE TYPE: Chronic | ICD-10-CM

## 2020-10-07 RX ORDER — DEXTROAMPHETAMINE SACCHARATE, AMPHETAMINE ASPARTATE MONOHYDRATE, DEXTROAMPHETAMINE SULFATE AND AMPHETAMINE SULFATE 7.5; 7.5; 7.5; 7.5 MG/1; MG/1; MG/1; MG/1
30 CAPSULE, EXTENDED RELEASE ORAL EVERY MORNING
Qty: 30 CAPSULE | Refills: 0 | OUTPATIENT
Start: 2020-10-07

## 2020-10-09 ENCOUNTER — TELEMEDICINE (OUTPATIENT)
Dept: PRIMARY CARE | Facility: CLINIC | Age: 55
End: 2020-10-09
Payer: COMMERCIAL

## 2020-10-09 DIAGNOSIS — I10 ESSENTIAL HYPERTENSION, BENIGN: Primary | ICD-10-CM

## 2020-10-09 DIAGNOSIS — M19.91 PRIMARY OSTEOARTHRITIS, UNSPECIFIED SITE: ICD-10-CM

## 2020-10-09 DIAGNOSIS — D12.6 ADENOMATOUS POLYP OF COLON, UNSPECIFIED PART OF COLON: ICD-10-CM

## 2020-10-09 DIAGNOSIS — F90.0 ADHD, PREDOMINANTLY INATTENTIVE TYPE: Chronic | ICD-10-CM

## 2020-10-09 PROCEDURE — 99213 OFFICE O/P EST LOW 20 MIN: CPT | Mod: 95 | Performed by: INTERNAL MEDICINE

## 2020-10-09 RX ORDER — DEXTROAMPHETAMINE SACCHARATE, AMPHETAMINE ASPARTATE MONOHYDRATE, DEXTROAMPHETAMINE SULFATE AND AMPHETAMINE SULFATE 7.5; 7.5; 7.5; 7.5 MG/1; MG/1; MG/1; MG/1
30 CAPSULE, EXTENDED RELEASE ORAL EVERY MORNING
Qty: 30 CAPSULE | Refills: 0 | Status: SHIPPED | OUTPATIENT
Start: 2020-10-09 | End: 2020-11-09 | Stop reason: SDUPTHER

## 2020-10-09 ASSESSMENT — ENCOUNTER SYMPTOMS
NERVOUS/ANXIOUS: 0
SINUS PAIN: 0
ACTIVITY CHANGE: 0
APPETITE CHANGE: 0
DYSPHORIC MOOD: 0
SHORTNESS OF BREATH: 0
FATIGUE: 0
FEVER: 0
DIARRHEA: 0
SLEEP DISTURBANCE: 0
PALPITATIONS: 0
CONFUSION: 0
DECREASED CONCENTRATION: 0
HYPERACTIVE: 0
RHINORRHEA: 0

## 2020-10-09 NOTE — ASSESSMENT & PLAN NOTE
Dx was made later in life.  Doing much better with concentration and focus. Helps him with saying on tasks; had hyper-focus.   Tolerating medication well with no side effects.

## 2020-10-09 NOTE — PROGRESS NOTES
Verification of Patient Location:  The patient affirms they are currently located in the following state: [place]    Request for Consent:    Video Encounter   Sarika, my name is Gilda Wayne MD.  Before we proceed, can you please verify your identification by telling me your full name and date of birth?  Can you tell me who is in the room with you?    You and I are about to have a telemedicine check-in or visit because you have requested it.  This is a live video-conference.  I am a real person, speaking to you in real time.  There is no one else with me on the video-conference.  However, when we use ([employer], [employer], etc) it is important for you to know that the video-conference may not be secure or private.  I am not recording this conversation and I am asking you not to record it.  This telemedicine visit will be billed to your health insurance or you, if you are self-insured.  You understand you will be responsible for any copayments or coinsurances that apply to your telemedicine visit.  Communication platform used for this encounter:  [employer]     Before starting our telemedicine visit, I am required to get your consent for this virtual check-in or visit by telemedicine. Do you consent?      Patient Response to Request for Consent:  Yes      Visit Documentation:  Subjective     Patient ID: Riki Bundy is a 55 y.o. male.  1965      Telemed visit for rx of Adderal xr.    Tolerating medication well with no side effects.   Dr Chow usually saw pt every 3 months.   No recent dose changes. Long standing rx - since he was 44.       The following have been reviewed and updated as appropriate in this visit:  Problems       Review of Systems   Constitutional: Negative for activity change, appetite change, fatigue and fever.   HENT: Negative for rhinorrhea and sinus pain.    Respiratory: Negative for shortness of breath.    Cardiovascular: Negative for chest pain, palpitations and leg swelling.    Gastrointestinal: Negative for diarrhea.   Psychiatric/Behavioral: Negative for confusion, decreased concentration, dysphoric mood and sleep disturbance. The patient is not nervous/anxious and is not hyperactive.          Assessment/Plan   Diagnoses and all orders for this visit:    Essential hypertension, benign (Primary)  Assessment & Plan:  Has not checked BP in a while.   s on atacand.  Adv to check BP and call if high.       ADHD, predominantly inattentive type  Assessment & Plan:  Dx was made later in life.  Doing much better with concentration and focus. Helps him with saying on tasks; had hyper-focus.   Tolerating medication well with no side effects.           Time Spent in Medical Discussion During This Encounter:     Total encounter time, with >50 percent spent counseling/coordinatin minutes

## 2020-10-09 NOTE — ASSESSMENT & PLAN NOTE
Asking about colon polyps done 5 years ago-- were pre-cancerous.  Had seen Dr Avila.  Adv to return to see him.

## 2020-11-09 DIAGNOSIS — F90.0 ADHD, PREDOMINANTLY INATTENTIVE TYPE: Chronic | ICD-10-CM

## 2020-11-09 RX ORDER — DEXTROAMPHETAMINE SACCHARATE, AMPHETAMINE ASPARTATE MONOHYDRATE, DEXTROAMPHETAMINE SULFATE AND AMPHETAMINE SULFATE 7.5; 7.5; 7.5; 7.5 MG/1; MG/1; MG/1; MG/1
30 CAPSULE, EXTENDED RELEASE ORAL EVERY MORNING
Qty: 30 CAPSULE | Refills: 0 | Status: SHIPPED | OUTPATIENT
Start: 2020-11-09 | End: 2020-12-08 | Stop reason: SDUPTHER

## 2020-11-09 NOTE — TELEPHONE ENCOUNTER
Medicine Refill Request    Last Office Visit: Visit date not found  Last Telemedicine Visit: 10/9/2020 Gilda Wayne MD    Next Office Visit: Visit date not found  Next Telemedicine Visit: Visit date not found         Current Outpatient Medications:   •  amphetamine-dextroamphetamine XR (ADDERALL XR) 30 mg 24 hr capsule, Take 1 capsule (30 mg total) by mouth every morning., Disp: 30 capsule, Rfl: 0  •  candesartan-hydrochlorothiazid (ATACAND HCT) 32-12.5 mg per tablet, Take 1 tablet by mouth once daily., Disp: 90 tablet, Rfl: 1  •  CRESTOR 10 mg tablet, Take 1 tablet (10 mg total) by mouth once daily., Disp: 90 tablet, Rfl: 3  •  levocetirizine (XYZAL) 5 mg tablet, Take 1 tablet (5 mg total) by mouth every evening., Disp: 90 tablet, Rfl: 1  •  naproxen (NAPROSYN) 500 mg tablet, Take 1 tablet (500 mg total) by mouth 2 (two) times a day with meals., Disp: 60 tablet, Rfl: 1      BP Readings from Last 3 Encounters:   12/20/19 132/88   09/27/19 120/72   06/27/19 (!) 160/102       Recent Lab results:  Lab Results   Component Value Date    CHOL 134 02/04/2020   ,   Lab Results   Component Value Date    HDL 48 02/04/2020   ,   Lab Results   Component Value Date    LDLCALC 69 02/04/2020   ,   Lab Results   Component Value Date    TRIG 90 02/04/2020        Lab Results   Component Value Date    GLUCOSE 112 (H) 02/04/2020   , No results found for: HGBA1C      Lab Results   Component Value Date    CREATININE 0.96 02/04/2020       No results found for: TSH

## 2020-12-08 DIAGNOSIS — F90.0 ADHD, PREDOMINANTLY INATTENTIVE TYPE: Chronic | ICD-10-CM

## 2020-12-09 RX ORDER — DEXTROAMPHETAMINE SACCHARATE, AMPHETAMINE ASPARTATE MONOHYDRATE, DEXTROAMPHETAMINE SULFATE AND AMPHETAMINE SULFATE 7.5; 7.5; 7.5; 7.5 MG/1; MG/1; MG/1; MG/1
30 CAPSULE, EXTENDED RELEASE ORAL EVERY MORNING
Qty: 30 CAPSULE | Refills: 0 | Status: SHIPPED | OUTPATIENT
Start: 2020-12-09 | End: 2021-01-04 | Stop reason: SDUPTHER

## 2021-01-04 DIAGNOSIS — F90.0 ADHD, PREDOMINANTLY INATTENTIVE TYPE: Chronic | ICD-10-CM

## 2021-01-04 RX ORDER — DEXTROAMPHETAMINE SACCHARATE, AMPHETAMINE ASPARTATE MONOHYDRATE, DEXTROAMPHETAMINE SULFATE AND AMPHETAMINE SULFATE 7.5; 7.5; 7.5; 7.5 MG/1; MG/1; MG/1; MG/1
30 CAPSULE, EXTENDED RELEASE ORAL EVERY MORNING
Qty: 30 CAPSULE | Refills: 0 | Status: SHIPPED | OUTPATIENT
Start: 2021-01-04 | End: 2021-02-05 | Stop reason: SDUPTHER

## 2021-01-26 DIAGNOSIS — I10 ESSENTIAL HYPERTENSION, BENIGN: ICD-10-CM

## 2021-01-26 RX ORDER — CANDESARTAN CILEXETIL AND HYDROCHLOROTHIAZIDE 32; 12.5 MG/1; MG/1
1 TABLET ORAL
Qty: 90 TABLET | Refills: 1 | Status: SHIPPED | OUTPATIENT
Start: 2021-01-26 | End: 2021-08-16

## 2021-02-02 ENCOUNTER — TELEPHONE (OUTPATIENT)
Dept: PRIMARY CARE | Facility: CLINIC | Age: 56
End: 2021-02-02

## 2021-02-02 NOTE — TELEPHONE ENCOUNTER
Patient is currently on a controlled medication for ADHD and must be seen every 3 months. If he would like, it can be a telemedicine appointment.

## 2021-02-02 NOTE — TELEPHONE ENCOUNTER
Last dose of   amphetamine-dextroamphetamine XR (ADDERALL XR) 30 mg 24 hr capsule  Is next Monday.is a visit required?  Can he do a my chart visit if so?

## 2021-02-05 ENCOUNTER — TELEMEDICINE (OUTPATIENT)
Dept: PRIMARY CARE | Facility: CLINIC | Age: 56
End: 2021-02-05
Payer: COMMERCIAL

## 2021-02-05 VITALS — HEIGHT: 71 IN | WEIGHT: 185 LBS | BODY MASS INDEX: 25.9 KG/M2

## 2021-02-05 DIAGNOSIS — F90.0 ADHD, PREDOMINANTLY INATTENTIVE TYPE: Chronic | ICD-10-CM

## 2021-02-05 DIAGNOSIS — D12.6 ADENOMATOUS POLYP OF COLON, UNSPECIFIED PART OF COLON: ICD-10-CM

## 2021-02-05 DIAGNOSIS — E78.00 HYPERCHOLESTEREMIA: ICD-10-CM

## 2021-02-05 DIAGNOSIS — R35.1 NOCTURIA: ICD-10-CM

## 2021-02-05 DIAGNOSIS — R73.9 HIGH BLOOD SUGAR: ICD-10-CM

## 2021-02-05 DIAGNOSIS — Z00.00 ENCOUNTER FOR PREVENTATIVE ADULT HEALTH CARE EXAMINATION: ICD-10-CM

## 2021-02-05 DIAGNOSIS — I10 ESSENTIAL HYPERTENSION, BENIGN: Primary | ICD-10-CM

## 2021-02-05 PROCEDURE — 99213 OFFICE O/P EST LOW 20 MIN: CPT | Mod: 95 | Performed by: INTERNAL MEDICINE

## 2021-02-05 RX ORDER — DEXTROAMPHETAMINE SACCHARATE, AMPHETAMINE ASPARTATE MONOHYDRATE, DEXTROAMPHETAMINE SULFATE AND AMPHETAMINE SULFATE 7.5; 7.5; 7.5; 7.5 MG/1; MG/1; MG/1; MG/1
30 CAPSULE, EXTENDED RELEASE ORAL EVERY MORNING
Qty: 30 CAPSULE | Refills: 0 | Status: SHIPPED | OUTPATIENT
Start: 2021-02-05 | End: 2021-03-07 | Stop reason: SDUPTHER

## 2021-02-05 SDOH — HEALTH STABILITY: MENTAL HEALTH: HOW MANY DRINKS CONTAINING ALCOHOL DO YOU HAVE ON A TYPICAL DAY WHEN YOU ARE DRINKING?: 1 OR 2

## 2021-02-05 ASSESSMENT — ENCOUNTER SYMPTOMS
ORTHOPNEA: 0
SHORTNESS OF BREATH: 0
PND: 0
PALPITATIONS: 0
SWEATS: 0
BLURRED VISION: 0
HYPERTENSION: 1
NECK PAIN: 0
HEADACHES: 0

## 2021-02-05 ASSESSMENT — PATIENT HEALTH QUESTIONNAIRE - PHQ9: SUM OF ALL RESPONSES TO PHQ9 QUESTIONS 1 & 2: 0

## 2021-02-05 NOTE — ASSESSMENT & PLAN NOTE
Reviewed PDMP. No red flags. Discussed medication side effects and risks with patient. Reiterated importance of trying to use lowest dose as tolerable.   Tolerating medication well with no side effects.   Refilled.

## 2021-02-05 NOTE — ASSESSMENT & PLAN NOTE
Had been off Adderall --  Resumed it, BP was high.   156/99 on home cuff.   Adv to plan follow up-- call if stays high.   Cut back on salt.

## 2021-02-05 NOTE — PROGRESS NOTES
Verification of Patient Location:  The patient affirms they are currently located in the following state: [place]    Request for Consent:    Video Encounter   Sarika, my name is Gilda Wayne MD.  Before we proceed, can you please verify your identification by telling me your full name and date of birth?  Can you tell me who is in the room with you?    You and I are about to have a telemedicine check-in or visit because you have requested it.  This is a live video-conference.  I am a real person, speaking to you in real time.  There is no one else with me on the video-conference.  However, when we use (Auto I.D., [employer], etc) it is important for you to know that the video-conference may not be secure or private.  I am not recording this conversation and I am asking you not to record it.  This telemedicine visit will be billed to your health insurance or you, if you are self-insured.  You understand you will be responsible for any copayments or coinsurances that apply to your telemedicine visit.  Communication platform used for this encounter:  Integrated Zoom via [employer] Video Visit     Before starting our telemedicine visit, I am required to get your consent for this virtual check-in or visit by telemedicine. Do you consent?      Patient Response to Request for Consent:  Yes      Visit Documentation:  Subjective     Patient ID: Riki Bundy is a 55 y.o. male.  1965      Telemed visit-    HTN.   Needs to get new cuff.   Thinks it might be high.     ADD med refill.   Still with pain in hands, knuckles. Had been worried about Lymes at the time, thinks is arthritis.       Hypertension  This is a chronic problem. The current episode started more than 1 year ago. The problem is unchanged. The problem is controlled. Pertinent negatives include no anxiety, blurred vision, chest pain, headaches, malaise/fatigue, neck pain, orthopnea, palpitations, peripheral edema, PND, shortness of breath or sweats. There are no  associated agents to hypertension. Risk factors for coronary artery disease include male gender, dyslipidemia and family history. The current treatment provides significant improvement. There are no compliance problems.  There is no history of angina, kidney disease, CAD/MI, CVA or heart failure.       The following have been reviewed and updated as appropriate in this visit:  Tobacco  Allergies  Meds  Med Hx  Surg Hx  Fam Hx  Soc Hx      Review of Systems   Constitutional: Negative for malaise/fatigue.   Eyes: Negative for blurred vision.   Respiratory: Negative for shortness of breath.    Cardiovascular: Negative for chest pain, palpitations, orthopnea and PND.   Musculoskeletal: Negative for neck pain.   Neurological: Negative for headaches.     Video Exam:  Atraumatic, normocephalic.  Eyes no discharge, redness; ears, nose visually normal.   Breathing comfortably, talking in complete sentences.   No obvious distress. No wheezing.   Mental status normal, visible cranial nerves normal.  No accessory muscle use.       Assessment/Plan   Diagnoses and all orders for this visit:    Essential hypertension, benign (Primary)  Assessment & Plan:  Had been off Adderall --  Resumed it, BP was high.   156/99 on home cuff.   Adv to plan follow up-- call if stays high.   Cut back on salt.       ADHD, predominantly inattentive type  Assessment & Plan:  Reviewed PDMP. No red flags. Discussed medication side effects and risks with patient. Reiterated importance of trying to use lowest dose as tolerable.   Tolerating medication well with no side effects.   Refilled.      Orders:  -     amphetamine-dextroamphetamine XR (ADDERALL XR) 30 mg 24 hr capsule; Take 1 capsule (30 mg total) by mouth every morning.    Hypercholesteremia  Assessment & Plan:  On crestor.  Recheck.     Orders:  -     Lipid panel; Future  -     Comprehensive metabolic panel; Future    High blood sugar  Assessment & Plan:  Noted on labs in 2020.   Plan  reorder.       Orders:  -     Hemoglobin A1c; Future    Encounter for preventative adult health care examination  -     CBC and Differential; Future  -     Urinalysis with microscopic; Future  -     TSH w reflex FT4; Future    Nocturia  -     PSA; Future    Adenomatous polyp of colon, unspecified part of colon  Assessment & Plan:  Precancerous.   Due for 5 year follow up colonoscopy.        Time Spent:  I spent 23 minutes on this date of service performing the following activities: obtaining history, entering orders, documenting, obtaining / reviewing records and providing counseling and education.

## 2021-02-13 ENCOUNTER — TELEPHONE (OUTPATIENT)
Dept: PRIMARY CARE | Facility: CLINIC | Age: 56
End: 2021-02-13

## 2021-02-13 NOTE — TELEPHONE ENCOUNTER
"Patient was in the office with his wife today.  Took his BP and pulse.     BP\" 142/80  Pulse: 80  SpO2: 98  "

## 2021-02-15 NOTE — TELEPHONE ENCOUNTER
S/W pt, informed as stated by Dr. Wayne. He agrees to monitor and understands the need to call if >140/90.

## 2021-02-15 NOTE — TELEPHONE ENCOUNTER
Much better but still borderline.  Continue low salt diet.  pls continue to monitor 1-2/week. Call if more than 140/90.

## 2021-03-07 DIAGNOSIS — F90.0 ADHD, PREDOMINANTLY INATTENTIVE TYPE: Chronic | ICD-10-CM

## 2021-03-08 RX ORDER — DEXTROAMPHETAMINE SACCHARATE, AMPHETAMINE ASPARTATE MONOHYDRATE, DEXTROAMPHETAMINE SULFATE AND AMPHETAMINE SULFATE 7.5; 7.5; 7.5; 7.5 MG/1; MG/1; MG/1; MG/1
30 CAPSULE, EXTENDED RELEASE ORAL EVERY MORNING
Qty: 30 CAPSULE | Refills: 0 | Status: SHIPPED | OUTPATIENT
Start: 2021-03-08 | End: 2021-04-06 | Stop reason: SDUPTHER

## 2021-03-08 NOTE — TELEPHONE ENCOUNTER
Medicine Refill Request    Last Office Visit: Visit date not found  Last Telemedicine Visit: 2/5/2021 Gilda Wayne MD    Next Office Visit: Visit date not found  Next Telemedicine Visit: Visit date not found         Current Outpatient Medications:   •  amphetamine-dextroamphetamine XR (ADDERALL XR) 30 mg 24 hr capsule, Take 1 capsule (30 mg total) by mouth every morning., Disp: 30 capsule, Rfl: 0  •  candesartan-hydrochlorothiazid (ATACAND HCT) 32-12.5 mg per tablet, Take 1 tablet by mouth once daily., Disp: 90 tablet, Rfl: 1  •  CRESTOR 10 mg tablet, Take 1 tablet (10 mg total) by mouth once daily., Disp: 90 tablet, Rfl: 3  •  levocetirizine (XYZAL) 5 mg tablet, Take 1 tablet (5 mg total) by mouth every evening. (Patient taking differently: Take 5 mg by mouth as needed.  ), Disp: 90 tablet, Rfl: 1  •  naproxen (NAPROSYN) 500 mg tablet, Take 1 tablet (500 mg total) by mouth 2 (two) times a day with meals. (Patient taking differently: Take 500 mg by mouth as needed.  ), Disp: 60 tablet, Rfl: 1      BP Readings from Last 3 Encounters:   12/20/19 132/88   09/27/19 120/72   06/27/19 (!) 160/102       Recent Lab results:  Lab Results   Component Value Date    CHOL 134 02/04/2020   ,   Lab Results   Component Value Date    HDL 48 02/04/2020   ,   Lab Results   Component Value Date    LDLCALC 69 02/04/2020   ,   Lab Results   Component Value Date    TRIG 90 02/04/2020        Lab Results   Component Value Date    GLUCOSE 112 (H) 02/04/2020   , No results found for: HGBA1C      Lab Results   Component Value Date    CREATININE 0.96 02/04/2020       No results found for: TSH

## 2021-04-06 DIAGNOSIS — F90.0 ADHD, PREDOMINANTLY INATTENTIVE TYPE: Chronic | ICD-10-CM

## 2021-04-06 RX ORDER — DEXTROAMPHETAMINE SACCHARATE, AMPHETAMINE ASPARTATE MONOHYDRATE, DEXTROAMPHETAMINE SULFATE AND AMPHETAMINE SULFATE 7.5; 7.5; 7.5; 7.5 MG/1; MG/1; MG/1; MG/1
30 CAPSULE, EXTENDED RELEASE ORAL EVERY MORNING
Qty: 30 CAPSULE | Refills: 0 | Status: SHIPPED | OUTPATIENT
Start: 2021-04-06 | End: 2021-05-07 | Stop reason: SDUPTHER

## 2021-05-07 DIAGNOSIS — F90.0 ADHD, PREDOMINANTLY INATTENTIVE TYPE: Chronic | ICD-10-CM

## 2021-05-08 RX ORDER — DEXTROAMPHETAMINE SACCHARATE, AMPHETAMINE ASPARTATE MONOHYDRATE, DEXTROAMPHETAMINE SULFATE AND AMPHETAMINE SULFATE 7.5; 7.5; 7.5; 7.5 MG/1; MG/1; MG/1; MG/1
30 CAPSULE, EXTENDED RELEASE ORAL EVERY MORNING
Qty: 30 CAPSULE | Refills: 0 | Status: SHIPPED | OUTPATIENT
Start: 2021-05-08 | End: 2021-06-08 | Stop reason: SDUPTHER

## 2021-06-02 ENCOUNTER — OFFICE VISIT (OUTPATIENT)
Dept: PRIMARY CARE | Facility: CLINIC | Age: 56
End: 2021-06-02
Payer: COMMERCIAL

## 2021-06-02 VITALS
HEIGHT: 71 IN | WEIGHT: 192 LBS | BODY MASS INDEX: 26.88 KG/M2 | HEART RATE: 97 BPM | TEMPERATURE: 97.3 F | SYSTOLIC BLOOD PRESSURE: 162 MMHG | OXYGEN SATURATION: 99 % | RESPIRATION RATE: 16 BRPM | DIASTOLIC BLOOD PRESSURE: 92 MMHG

## 2021-06-02 DIAGNOSIS — E78.00 HYPERCHOLESTEREMIA: ICD-10-CM

## 2021-06-02 DIAGNOSIS — F90.0 ADHD, PREDOMINANTLY INATTENTIVE TYPE: Primary | Chronic | ICD-10-CM

## 2021-06-02 DIAGNOSIS — Z12.5 SCREENING FOR MALIGNANT NEOPLASM OF PROSTATE: ICD-10-CM

## 2021-06-02 DIAGNOSIS — Z00.00 ENCOUNTER FOR PREVENTATIVE ADULT HEALTH CARE EXAMINATION: ICD-10-CM

## 2021-06-02 DIAGNOSIS — R73.9 HIGH BLOOD SUGAR: ICD-10-CM

## 2021-06-02 DIAGNOSIS — I10 ESSENTIAL HYPERTENSION, BENIGN: ICD-10-CM

## 2021-06-02 PROCEDURE — 3077F SYST BP >= 140 MM HG: CPT | Performed by: INTERNAL MEDICINE

## 2021-06-02 PROCEDURE — 3008F BODY MASS INDEX DOCD: CPT | Performed by: INTERNAL MEDICINE

## 2021-06-02 PROCEDURE — 99214 OFFICE O/P EST MOD 30 MIN: CPT | Performed by: INTERNAL MEDICINE

## 2021-06-02 PROCEDURE — 3080F DIAST BP >= 90 MM HG: CPT | Performed by: INTERNAL MEDICINE

## 2021-06-02 RX ORDER — ROSUVASTATIN CALCIUM 10 MG/1
10 TABLET, FILM COATED ORAL
Qty: 90 TABLET | Refills: 3 | Status: SHIPPED | OUTPATIENT
Start: 2021-06-02 | End: 2022-06-22 | Stop reason: SDUPTHER

## 2021-06-02 RX ORDER — METOPROLOL SUCCINATE 25 MG/1
25 TABLET, EXTENDED RELEASE ORAL DAILY
Qty: 90 TABLET | Refills: 1 | Status: SHIPPED | OUTPATIENT
Start: 2021-06-02 | End: 2022-01-26 | Stop reason: SDUPTHER

## 2021-06-02 ASSESSMENT — ENCOUNTER SYMPTOMS
HYPERTENSION: 1
APPETITE CHANGE: 0
ACTIVITY CHANGE: 0
JOINT SWELLING: 0
CHEST TIGHTNESS: 0
WEAKNESS: 0
SHORTNESS OF BREATH: 0
ANOREXIA: 0
HEMATURIA: 0
PALPITATIONS: 0
DIZZINESS: 0
ARTHRALGIAS: 1
NUMBNESS: 0
HEADACHES: 0
FATIGUE: 0
VERTIGO: 1
MYALGIAS: 0
SLEEP DISTURBANCE: 0
BACK PAIN: 0
LIGHT-HEADEDNESS: 0
COUGH: 0
BLOOD IN STOOL: 0

## 2021-06-02 NOTE — ASSESSMENT & PLAN NOTE
On Adderall --  BP still high.   Has had increased salt sensitivity.   On Atacand-HCT.  Plan  HR 97.    Add beta blocker.   Bring cuff in-- plans to buy new one.

## 2021-06-02 NOTE — ASSESSMENT & PLAN NOTE
Off crestor x 2 months.   Had not done calcium test that Dr hCow recommended.   FH CAD CABG complications in dad.   Would like to get back on crestor.  Check labs.

## 2021-06-02 NOTE — PROGRESS NOTES
[facility] in [place]  Dr. Gilda Wayne  [address], [address]  [place] [address]  Phone: [phone]  Fax: [phone]        Patient ID: Riki Bundy                              : 1965    Visit Date: 2021    Chief Complaint: Follow-up      Patient ID: Riki Bundy is a 56 y.o. male.    HPI  Follow up -  HTN.   Not checking at home.   Has been rushing about--  Has been on BB in past. Hard time running.   Tried CCB.  Had been on ACEI.       ADHD stable. Very active. No problems.     Left elbow pain-- thinks injured it last year.   Some joint sympts better off statin-- stopped it few moths ago. Ran out.       Hypertension  This is a chronic problem. The current episode started more than 1 year ago. The problem is unchanged. The problem is uncontrolled. Pertinent negatives include no chest pain, headaches, palpitations or shortness of breath. Risk factors for coronary artery disease include male gender, family history and dyslipidemia. Past treatments include angiotensin blockers and diuretics. The current treatment provides moderate improvement. Compliance problems include diet and exercise.  There is no history of angina or CAD/MI.   ADHD  This is a chronic problem. The current episode started more than 1 year ago. Associated symptoms include arthralgias and vertigo. Pertinent negatives include no anorexia, chest pain, congestion, coughing, fatigue, headaches, joint swelling, myalgias, numbness or weakness. The treatment provided significant relief.         Patient Active Problem List   Diagnosis   • Hypercholesteremia   • Essential hypertension, benign   • Acute allergic rhinitis   • ADHD, predominantly inattentive type   • Panic disorder without agoraphobia   • Displacement of lumbar intervertebral disc without myelopathy   • Acute pain of left knee   • Neck pain   • Sebaceous cyst of right axilla   • Achilles tendinitis of left lower extremity   • Primary  osteoarthritis   • Abnormal CBC   • Adenomatous colon polyp   • High blood sugar       Past Medical History:   Diagnosis Date   • Acute allergic rhinitis    • ADHD (attention deficit hyperactivity disorder), inattentive type    • Displacement of lumbar intervertebral disc without myelopathy    • Essential hypertension, benign    • Hypercholesteremia    • Panic disorder without agoraphobia        Past Surgical History:   Procedure Laterality Date   • MENISCECTOMY  1982         Current Outpatient Medications:   •  amphetamine-dextroamphetamine XR (ADDERALL XR) 30 mg 24 hr capsule, Take 1 capsule (30 mg total) by mouth every morning., Disp: 30 capsule, Rfl: 0  •  candesartan-hydrochlorothiazid (ATACAND HCT) 32-12.5 mg per tablet, Take 1 tablet by mouth once daily., Disp: 90 tablet, Rfl: 1  •  CRESTOR 10 mg tablet, Take 1 tablet (10 mg total) by mouth once daily., Disp: 90 tablet, Rfl: 3  •  levocetirizine (XYZAL) 5 mg tablet, Take 1 tablet (5 mg total) by mouth every evening. (Patient taking differently: Take 5 mg by mouth as needed.  ), Disp: 90 tablet, Rfl: 1  •  naproxen (NAPROSYN) 500 mg tablet, Take 1 tablet (500 mg total) by mouth 2 (two) times a day with meals. (Patient taking differently: Take 500 mg by mouth as needed.  ), Disp: 60 tablet, Rfl: 1  •  metoprolol succinate XL (TOPROL-XL) 25 mg 24 hr tablet, Take 1 tablet (25 mg total) by mouth daily., Disp: 90 tablet, Rfl: 1    Allergies   Allergen Reactions   • Sulfa (Sulfonamide Antibiotics) Other (see comments)     Get fever to the point of fainting   • Cephalosporins Rash       Family History   Problem Relation Age of Onset   • Lupus Biological Mother    • Vasculitis Biological Mother    • Kidney disease Biological Mother    • Deep vein thrombosis Biological Father    • Heart disease Biological Father    • No Known Problems Biological Brother        Social History     Tobacco Use   • Smoking status: Never Smoker   • Smokeless tobacco: Never Used   Substance  "Use Topics   • Alcohol use: Yes     Alcohol/week: 28.0 standard drinks     Types: 28 Cans of beer per week   • Drug use: Never       Health Maintenance   Topic Date Due   • DTaP, Tdap, and Td Vaccines (2 - Td) 05/02/2021   • Zoster Vaccine (1 of 2) 02/05/2022 (Originally 2/9/2014)   • HIV Screening  02/05/2022 (Originally [birth year]1977)   • Varicella Vaccines (1 of 2 - 2-dose childhood series) 07/07/2031 (Originally 2/9/1965)   • Colonoscopy  03/11/2031   • Influenza Vaccine  Completed   • Hepatitis C Screening  Completed   • COVID-19 Vaccine  Completed   • Meningococcal ACWY  Aged Out   • HIB Vaccines  Aged Out   • IPV Vaccines  Aged Out   • HPV Vaccines  Aged Out   • Pneumococcal  Aged Out         The following have been reviewed and updated as appropriate in this visit:  Tobacco  Allergies  Meds  Problems  Med Hx         Review of System  Review of Systems   Constitutional: Negative for activity change, appetite change and fatigue.   HENT: Negative for congestion.    Respiratory: Negative for cough, chest tightness and shortness of breath.    Cardiovascular: Negative for chest pain, palpitations and leg swelling.   Gastrointestinal: Negative for anorexia and blood in stool.   Genitourinary: Negative for hematuria.   Musculoskeletal: Positive for arthralgias. Negative for back pain, gait problem, joint swelling and myalgias.   Neurological: Positive for vertigo. Negative for dizziness, weakness, light-headedness, numbness and headaches.   Psychiatric/Behavioral: Negative for sleep disturbance.       Objective     Vitals  Vitals:    06/02/21 1149   BP: (!) 162/92   BP Location: Left upper arm   Patient Position: Sitting   Pulse: 97   Resp: 16   Temp: 36.3 °C (97.3 °F)   TempSrc: Temporal   SpO2: 99%   Weight: 87.1 kg (192 lb)   Height: 1.803 m (5' 11\")       Body mass index is 26.78 kg/m².    Physical Exam  Physical Exam  Constitutional:       General: He is not in acute distress.     Appearance: Normal appearance. " He is well-developed. He is not diaphoretic.   HENT:      Head: Normocephalic and atraumatic.      Right Ear: External ear normal.      Left Ear: External ear normal.      Nose: Nose normal.   Eyes:      General: No scleral icterus.     Conjunctiva/sclera: Conjunctivae normal.   Neck:      Thyroid: No thyromegaly.      Vascular: No carotid bruit or JVD.      Trachea: No tracheal deviation.   Cardiovascular:      Rate and Rhythm: Normal rate and regular rhythm.      Heart sounds: Normal heart sounds. No murmur heard.     Pulmonary:      Effort: Pulmonary effort is normal. No respiratory distress.      Breath sounds: Normal breath sounds. No wheezing or rales.   Abdominal:      General: There is no distension.      Palpations: Abdomen is soft.      Tenderness: There is no abdominal tenderness.   Musculoskeletal:         General: No tenderness.      Cervical back: Neck supple.      Right lower leg: No edema.      Left lower leg: No edema.   Skin:     General: Skin is warm and dry.      Findings: No erythema or rash.   Neurological:      Mental Status: He is alert and oriented to person, place, and time.      Cranial Nerves: No cranial nerve deficit.      Sensory: No sensory deficit.      Motor: No abnormal muscle tone.   Psychiatric:         Behavior: Behavior normal.         Thought Content: Thought content normal.         Judgment: Judgment normal.         Assessment/Plan     Problem List Items Addressed This Visit        Unprioritized    ADHD, predominantly inattentive type - Primary (Chronic)     Reviewed PDMP. No red flags.   Tolerating medication well with no side effects.   Next refill due 6/8.           Hypercholesteremia     Off crestor x 2 months.   Had not done calcium test that Dr Chow recommended.   FH CAD CABG complications in dad.   Would like to get back on crestor.  Check labs.          Relevant Medications    CRESTOR 10 mg tablet    Other Relevant Orders    Comprehensive metabolic panel    Lipid  panel    Essential hypertension, benign     On Adderall --  BP still high.   Has had increased salt sensitivity.   On Atacand-HCT.  Plan  HR 97.    Add beta blocker.   Bring cuff in-- plans to buy new one.              Relevant Medications    metoprolol succinate XL (TOPROL-XL) 25 mg 24 hr tablet    High blood sugar     Check A1c, FBS.          Relevant Orders    Hemoglobin A1c      Other Visit Diagnoses     Encounter for preventative adult health care examination        Relevant Orders    CBC and Differential    Comprehensive metabolic panel    Urinalysis with microscopic    Screening for malignant neoplasm of prostate        Relevant Orders    PSA          Return in about 4 weeks (around 6/30/2021).      Gilda Wayne MD  6/2/2021

## 2021-06-02 NOTE — ASSESSMENT & PLAN NOTE
Reviewed PDMP. No red flags.   Tolerating medication well with no side effects.   Next refill due 6/8.

## 2021-06-08 DIAGNOSIS — F90.0 ADHD, PREDOMINANTLY INATTENTIVE TYPE: Chronic | ICD-10-CM

## 2021-06-08 RX ORDER — DEXTROAMPHETAMINE SACCHARATE, AMPHETAMINE ASPARTATE MONOHYDRATE, DEXTROAMPHETAMINE SULFATE AND AMPHETAMINE SULFATE 7.5; 7.5; 7.5; 7.5 MG/1; MG/1; MG/1; MG/1
30 CAPSULE, EXTENDED RELEASE ORAL EVERY MORNING
Qty: 30 CAPSULE | Refills: 0 | Status: SHIPPED | OUTPATIENT
Start: 2021-06-08 | End: 2021-07-07 | Stop reason: SDUPTHER

## 2021-06-08 NOTE — TELEPHONE ENCOUNTER
Medicine Refill Request    Last Office Visit: 6/2/2021  Last Telemedicine Visit: 2/5/2021 Gilda Wayne MD    Next Office Visit: Visit date not found  Next Telemedicine Visit: Visit date not found         Current Outpatient Medications:   •  amphetamine-dextroamphetamine XR (ADDERALL XR) 30 mg 24 hr capsule, Take 1 capsule (30 mg total) by mouth every morning., Disp: 30 capsule, Rfl: 0  •  candesartan-hydrochlorothiazid (ATACAND HCT) 32-12.5 mg per tablet, Take 1 tablet by mouth once daily., Disp: 90 tablet, Rfl: 1  •  CRESTOR 10 mg tablet, Take 1 tablet (10 mg total) by mouth once daily., Disp: 90 tablet, Rfl: 3  •  levocetirizine (XYZAL) 5 mg tablet, Take 1 tablet (5 mg total) by mouth every evening. (Patient taking differently: Take 5 mg by mouth as needed.  ), Disp: 90 tablet, Rfl: 1  •  metoprolol succinate XL (TOPROL-XL) 25 mg 24 hr tablet, Take 1 tablet (25 mg total) by mouth daily., Disp: 90 tablet, Rfl: 1  •  naproxen (NAPROSYN) 500 mg tablet, Take 1 tablet (500 mg total) by mouth 2 (two) times a day with meals. (Patient taking differently: Take 500 mg by mouth as needed.  ), Disp: 60 tablet, Rfl: 1      BP Readings from Last 3 Encounters:   06/02/21 (!) 162/92   12/20/19 132/88   09/27/19 120/72       Recent Lab results:  Lab Results   Component Value Date    CHOL 134 02/04/2020   ,   Lab Results   Component Value Date    HDL 48 02/04/2020   ,   Lab Results   Component Value Date    LDLCALC 69 02/04/2020   ,   Lab Results   Component Value Date    TRIG 90 02/04/2020        Lab Results   Component Value Date    GLUCOSE 112 (H) 02/04/2020   , No results found for: HGBA1C      Lab Results   Component Value Date    CREATININE 0.96 02/04/2020       No results found for: TSH

## 2021-07-07 DIAGNOSIS — F90.0 ADHD, PREDOMINANTLY INATTENTIVE TYPE: Chronic | ICD-10-CM

## 2021-07-07 RX ORDER — DEXTROAMPHETAMINE SACCHARATE, AMPHETAMINE ASPARTATE MONOHYDRATE, DEXTROAMPHETAMINE SULFATE AND AMPHETAMINE SULFATE 7.5; 7.5; 7.5; 7.5 MG/1; MG/1; MG/1; MG/1
30 CAPSULE, EXTENDED RELEASE ORAL EVERY MORNING
Qty: 30 CAPSULE | Refills: 0 | Status: SHIPPED | OUTPATIENT
Start: 2021-07-07 | End: 2021-08-03 | Stop reason: SDUPTHER

## 2021-08-03 DIAGNOSIS — F90.0 ADHD, PREDOMINANTLY INATTENTIVE TYPE: Chronic | ICD-10-CM

## 2021-08-04 RX ORDER — DEXTROAMPHETAMINE SACCHARATE, AMPHETAMINE ASPARTATE MONOHYDRATE, DEXTROAMPHETAMINE SULFATE AND AMPHETAMINE SULFATE 7.5; 7.5; 7.5; 7.5 MG/1; MG/1; MG/1; MG/1
30 CAPSULE, EXTENDED RELEASE ORAL EVERY MORNING
Qty: 30 CAPSULE | Refills: 0 | Status: SHIPPED | OUTPATIENT
Start: 2021-08-04 | End: 2021-09-03 | Stop reason: SDUPTHER

## 2021-08-16 DIAGNOSIS — I10 ESSENTIAL HYPERTENSION, BENIGN: ICD-10-CM

## 2021-08-16 RX ORDER — CANDESARTAN CILEXETIL AND HYDROCHLOROTHIAZIDE 32; 12.5 MG/1; MG/1
TABLET ORAL
Qty: 30 TABLET | Refills: 0 | Status: SHIPPED | OUTPATIENT
Start: 2021-08-16 | End: 2021-08-20 | Stop reason: SDUPTHER

## 2021-08-20 DIAGNOSIS — I10 ESSENTIAL HYPERTENSION, BENIGN: ICD-10-CM

## 2021-08-20 RX ORDER — CANDESARTAN CILEXETIL AND HYDROCHLOROTHIAZIDE 32; 12.5 MG/1; MG/1
1 TABLET ORAL
Qty: 30 TABLET | Refills: 0 | Status: SHIPPED | OUTPATIENT
Start: 2021-08-20 | End: 2021-10-04

## 2021-08-20 NOTE — TELEPHONE ENCOUNTER
Medicine Refill Request    Last Office Visit: 6/2/2021  Last Telemedicine Visit: 2/5/2021 Gilda Wayne MD    Next Office Visit: Visit date not found  Next Telemedicine Visit: Visit date not found         Current Outpatient Medications:   •  amphetamine-dextroamphetamine XR (ADDERALL XR) 30 mg 24 hr capsule, Take 1 capsule (30 mg total) by mouth every morning., Disp: 30 capsule, Rfl: 0  •  candesartan-hydrochlorothiazid (ATACAND HCT) 32-12.5 mg per tablet, TAKE 1 TABLET BY MOUTH EVERY DAY, Disp: 30 tablet, Rfl: 0  •  CRESTOR 10 mg tablet, Take 1 tablet (10 mg total) by mouth once daily., Disp: 90 tablet, Rfl: 3  •  levocetirizine (XYZAL) 5 mg tablet, Take 1 tablet (5 mg total) by mouth every evening. (Patient taking differently: Take 5 mg by mouth as needed.  ), Disp: 90 tablet, Rfl: 1  •  metoprolol succinate XL (TOPROL-XL) 25 mg 24 hr tablet, Take 1 tablet (25 mg total) by mouth daily., Disp: 90 tablet, Rfl: 1  •  naproxen (NAPROSYN) 500 mg tablet, Take 1 tablet (500 mg total) by mouth 2 (two) times a day with meals. (Patient taking differently: Take 500 mg by mouth as needed.  ), Disp: 60 tablet, Rfl: 1      BP Readings from Last 3 Encounters:   06/02/21 (!) 162/92   12/20/19 132/88   09/27/19 120/72       Recent Lab results:  Lab Results   Component Value Date    CHOL 134 02/04/2020   ,   Lab Results   Component Value Date    HDL 48 02/04/2020   ,   Lab Results   Component Value Date    LDLCALC 69 02/04/2020   ,   Lab Results   Component Value Date    TRIG 90 02/04/2020        Lab Results   Component Value Date    GLUCOSE 112 (H) 02/04/2020   , No results found for: HGBA1C      Lab Results   Component Value Date    CREATININE 0.96 02/04/2020       No results found for: TSH

## 2021-09-03 DIAGNOSIS — F90.0 ADHD, PREDOMINANTLY INATTENTIVE TYPE: Chronic | ICD-10-CM

## 2021-09-03 RX ORDER — DEXTROAMPHETAMINE SACCHARATE, AMPHETAMINE ASPARTATE MONOHYDRATE, DEXTROAMPHETAMINE SULFATE AND AMPHETAMINE SULFATE 7.5; 7.5; 7.5; 7.5 MG/1; MG/1; MG/1; MG/1
30 CAPSULE, EXTENDED RELEASE ORAL EVERY MORNING
Qty: 30 CAPSULE | Refills: 0 | Status: SHIPPED | OUTPATIENT
Start: 2021-09-03 | End: 2021-09-09 | Stop reason: SDUPTHER

## 2021-09-03 NOTE — TELEPHONE ENCOUNTER
Medicine Refill Request    Last Office Visit: 6/2/2021  Last Telemedicine Visit: 2/5/2021 Gilda Wayne MD    Next Office Visit: Visit date not found  Next Telemedicine Visit: Visit date not found         Current Outpatient Medications:   •  amphetamine-dextroamphetamine XR (ADDERALL XR) 30 mg 24 hr capsule, Take 1 capsule (30 mg total) by mouth every morning., Disp: 30 capsule, Rfl: 0  •  candesartan-hydrochlorothiazid (ATACAND HCT) 32-12.5 mg per tablet, Take 1 tablet by mouth once daily., Disp: 30 tablet, Rfl: 0  •  CRESTOR 10 mg tablet, Take 1 tablet (10 mg total) by mouth once daily., Disp: 90 tablet, Rfl: 3  •  levocetirizine (XYZAL) 5 mg tablet, Take 1 tablet (5 mg total) by mouth every evening. (Patient taking differently: Take 5 mg by mouth as needed.  ), Disp: 90 tablet, Rfl: 1  •  metoprolol succinate XL (TOPROL-XL) 25 mg 24 hr tablet, Take 1 tablet (25 mg total) by mouth daily., Disp: 90 tablet, Rfl: 1  •  naproxen (NAPROSYN) 500 mg tablet, Take 1 tablet (500 mg total) by mouth 2 (two) times a day with meals. (Patient taking differently: Take 500 mg by mouth as needed.  ), Disp: 60 tablet, Rfl: 1      BP Readings from Last 3 Encounters:   06/02/21 (!) 162/92   12/20/19 132/88   09/27/19 120/72       Recent Lab results:  Lab Results   Component Value Date    CHOL 134 02/04/2020   ,   Lab Results   Component Value Date    HDL 48 02/04/2020   ,   Lab Results   Component Value Date    LDLCALC 69 02/04/2020   ,   Lab Results   Component Value Date    TRIG 90 02/04/2020        Lab Results   Component Value Date    GLUCOSE 112 (H) 02/04/2020   , No results found for: HGBA1C      Lab Results   Component Value Date    CREATININE 0.96 02/04/2020       No results found for: TSH

## 2021-09-07 ENCOUNTER — TELEPHONE (OUTPATIENT)
Dept: PRIMARY CARE | Facility: CLINIC | Age: 56
End: 2021-09-07

## 2021-09-07 NOTE — TELEPHONE ENCOUNTER
Medicine Refill Request    Last Office Visit: 6/2/2021  Last Telemedicine Visit: 2/5/2021 Gilda Wayne MD    Next Office Visit: Visit date not found  Next Telemedicine Visit: Visit date not found     Please call in Aubrey      To Pedro Luis Cabral  [facility]      Current Outpatient Medications:   •  amphetamine-dextroamphetamine XR (ADDERALL XR) 30 mg 24 hr capsule, Take 1 capsule (30 mg total) by mouth every morning., Disp: 30 capsule, Rfl: 0  •  candesartan-hydrochlorothiazid (ATACAND HCT) 32-12.5 mg per tablet, Take 1 tablet by mouth once daily., Disp: 30 tablet, Rfl: 0  •  CRESTOR 10 mg tablet, Take 1 tablet (10 mg total) by mouth once daily., Disp: 90 tablet, Rfl: 3  •  levocetirizine (XYZAL) 5 mg tablet, Take 1 tablet (5 mg total) by mouth every evening. (Patient taking differently: Take 5 mg by mouth as needed.  ), Disp: 90 tablet, Rfl: 1  •  metoprolol succinate XL (TOPROL-XL) 25 mg 24 hr tablet, Take 1 tablet (25 mg total) by mouth daily., Disp: 90 tablet, Rfl: 1  •  naproxen (NAPROSYN) 500 mg tablet, Take 1 tablet (500 mg total) by mouth 2 (two) times a day with meals. (Patient taking differently: Take 500 mg by mouth as needed.  ), Disp: 60 tablet, Rfl: 1      BP Readings from Last 3 Encounters:   06/02/21 (!) 162/92   12/20/19 132/88   09/27/19 120/72       Recent Lab results:  Lab Results   Component Value Date    CHOL 134 02/04/2020   ,   Lab Results   Component Value Date    HDL 48 02/04/2020   ,   Lab Results   Component Value Date    LDLCALC 69 02/04/2020   ,   Lab Results   Component Value Date    TRIG 90 02/04/2020        Lab Results   Component Value Date    GLUCOSE 112 (H) 02/04/2020   , No results found for: HGBA1C      Lab Results   Component Value Date    CREATININE 0.96 02/04/2020       No results found for: TSH

## 2021-09-08 ENCOUNTER — TELEPHONE (OUTPATIENT)
Dept: PRIMARY CARE | Facility: CLINIC | Age: 56
End: 2021-09-08

## 2021-09-08 DIAGNOSIS — F90.0 ADHD, PREDOMINANTLY INATTENTIVE TYPE: Chronic | ICD-10-CM

## 2021-09-08 NOTE — TELEPHONE ENCOUNTER
Need adderall sent to [facility] in [place].  [employer] system has been down since last week.     He is out of meds and they are saying they cannot fill the script because they do not have the capability of getting it out of the system.

## 2021-09-08 NOTE — TELEPHONE ENCOUNTER
Lindsay-  Call the pharmacy and find out if this is the case.  We cannot keep sending this prescription to multiple pharmacies.

## 2021-09-08 NOTE — TELEPHONE ENCOUNTER
Called pharmacy and phone lines are down. Message is to call back at a later time. Left a message for pharmacy to call back asap.

## 2021-09-09 PROBLEM — F41.0 PANIC DISORDER WITHOUT AGORAPHOBIA: Chronic | Status: RESOLVED | Noted: 2018-09-19 | Resolved: 2021-09-09

## 2021-09-09 RX ORDER — DEXTROAMPHETAMINE SACCHARATE, AMPHETAMINE ASPARTATE MONOHYDRATE, DEXTROAMPHETAMINE SULFATE AND AMPHETAMINE SULFATE 7.5; 7.5; 7.5; 7.5 MG/1; MG/1; MG/1; MG/1
30 CAPSULE, EXTENDED RELEASE ORAL EVERY MORNING
Qty: 30 CAPSULE | Refills: 0 | Status: SHIPPED | OUTPATIENT
Start: 2021-09-09 | End: 2021-09-28 | Stop reason: SDUPTHER

## 2021-09-09 NOTE — TELEPHONE ENCOUNTER
[employer] is done and he would like the  adderal xr 30 mg once daily    sent to  [facility]   He verified that they have the medication

## 2021-09-28 ENCOUNTER — TELEMEDICINE (OUTPATIENT)
Dept: PRIMARY CARE | Facility: CLINIC | Age: 56
End: 2021-09-28
Payer: COMMERCIAL

## 2021-09-28 DIAGNOSIS — F90.0 ADHD, PREDOMINANTLY INATTENTIVE TYPE: Chronic | ICD-10-CM

## 2021-09-28 PROCEDURE — 99999 PR OFFICE/OUTPT VISIT,PROCEDURE ONLY: CPT | Mod: 95 | Performed by: INTERNAL MEDICINE

## 2021-09-28 RX ORDER — DEXTROAMPHETAMINE SACCHARATE, AMPHETAMINE ASPARTATE MONOHYDRATE, DEXTROAMPHETAMINE SULFATE AND AMPHETAMINE SULFATE 7.5; 7.5; 7.5; 7.5 MG/1; MG/1; MG/1; MG/1
30 CAPSULE, EXTENDED RELEASE ORAL EVERY MORNING
Qty: 30 CAPSULE | Refills: 0 | Status: SHIPPED | OUTPATIENT
Start: 2021-09-28 | End: 2021-11-03 | Stop reason: SDUPTHER

## 2021-10-04 DIAGNOSIS — I10 ESSENTIAL HYPERTENSION, BENIGN: ICD-10-CM

## 2021-10-04 RX ORDER — CANDESARTAN CILEXETIL AND HYDROCHLOROTHIAZIDE 32; 12.5 MG/1; MG/1
TABLET ORAL
Qty: 30 TABLET | Refills: 0 | Status: SHIPPED | OUTPATIENT
Start: 2021-10-04 | End: 2021-11-03 | Stop reason: SDUPTHER

## 2021-10-04 NOTE — TELEPHONE ENCOUNTER
Medicine Refill Request    Last Office Visit: 6/2/2021  Last Telemedicine Visit: 9/28/2021 Gilda Wayne MD    Next Office Visit: Visit date not found  Next Telemedicine Visit: Visit date not found         Current Outpatient Medications:   •  amphetamine-dextroamphetamine XR (ADDERALL XR) 30 mg 24 hr capsule, Take 1 capsule (30 mg total) by mouth every morning., Disp: 30 capsule, Rfl: 0  •  candesartan-hydrochlorothiazid (ATACAND HCT) 32-12.5 mg per tablet, Take 1 tablet by mouth once daily., Disp: 30 tablet, Rfl: 0  •  CRESTOR 10 mg tablet, Take 1 tablet (10 mg total) by mouth once daily., Disp: 90 tablet, Rfl: 3  •  levocetirizine (XYZAL) 5 mg tablet, Take 1 tablet (5 mg total) by mouth every evening. (Patient taking differently: Take 5 mg by mouth as needed.  ), Disp: 90 tablet, Rfl: 1  •  metoprolol succinate XL (TOPROL-XL) 25 mg 24 hr tablet, Take 1 tablet (25 mg total) by mouth daily., Disp: 90 tablet, Rfl: 1  •  naproxen (NAPROSYN) 500 mg tablet, Take 1 tablet (500 mg total) by mouth 2 (two) times a day with meals. (Patient taking differently: Take 500 mg by mouth as needed.  ), Disp: 60 tablet, Rfl: 1      BP Readings from Last 3 Encounters:   06/02/21 (!) 162/92   12/20/19 132/88   09/27/19 120/72       Recent Lab results:  Lab Results   Component Value Date    CHOL 134 02/04/2020   ,   Lab Results   Component Value Date    HDL 48 02/04/2020   ,   Lab Results   Component Value Date    LDLCALC 69 02/04/2020   ,   Lab Results   Component Value Date    TRIG 90 02/04/2020        Lab Results   Component Value Date    GLUCOSE 112 (H) 02/04/2020   , No results found for: HGBA1C      Lab Results   Component Value Date    CREATININE 0.96 02/04/2020       No results found for: TSH

## 2021-11-03 ENCOUNTER — OFFICE VISIT (OUTPATIENT)
Dept: PRIMARY CARE | Facility: CLINIC | Age: 56
End: 2021-11-03
Payer: COMMERCIAL

## 2021-11-03 VITALS
DIASTOLIC BLOOD PRESSURE: 100 MMHG | SYSTOLIC BLOOD PRESSURE: 152 MMHG | WEIGHT: 180.6 LBS | OXYGEN SATURATION: 98 % | HEART RATE: 59 BPM | BODY MASS INDEX: 25.28 KG/M2 | TEMPERATURE: 98.4 F | HEIGHT: 71 IN

## 2021-11-03 DIAGNOSIS — R73.9 HIGH BLOOD SUGAR: ICD-10-CM

## 2021-11-03 DIAGNOSIS — S69.91XA HAND INJURY, RIGHT, INITIAL ENCOUNTER: ICD-10-CM

## 2021-11-03 DIAGNOSIS — E78.00 HYPERCHOLESTEREMIA: ICD-10-CM

## 2021-11-03 DIAGNOSIS — Z23 NEED FOR VACCINATION: Primary | ICD-10-CM

## 2021-11-03 DIAGNOSIS — I10 ESSENTIAL HYPERTENSION, BENIGN: ICD-10-CM

## 2021-11-03 DIAGNOSIS — Z00.00 ENCOUNTER FOR PREVENTATIVE ADULT HEALTH CARE EXAMINATION: ICD-10-CM

## 2021-11-03 DIAGNOSIS — F90.0 ADHD, PREDOMINANTLY INATTENTIVE TYPE: Chronic | ICD-10-CM

## 2021-11-03 PROBLEM — R79.89 ABNORMAL CBC: Status: RESOLVED | Noted: 2020-06-05 | Resolved: 2021-11-03

## 2021-11-03 PROBLEM — M76.62 ACHILLES TENDINITIS OF LEFT LOWER EXTREMITY: Status: RESOLVED | Noted: 2019-12-20 | Resolved: 2021-11-03

## 2021-11-03 PROBLEM — Z82.49 FH: CAD (CORONARY ARTERY DISEASE): Status: ACTIVE | Noted: 2021-11-03

## 2021-11-03 PROBLEM — M54.2 NECK PAIN: Status: RESOLVED | Noted: 2019-01-09 | Resolved: 2021-11-03

## 2021-11-03 PROCEDURE — 90686 IIV4 VACC NO PRSV 0.5 ML IM: CPT | Performed by: INTERNAL MEDICINE

## 2021-11-03 PROCEDURE — 3080F DIAST BP >= 90 MM HG: CPT | Performed by: INTERNAL MEDICINE

## 2021-11-03 PROCEDURE — 3008F BODY MASS INDEX DOCD: CPT | Performed by: INTERNAL MEDICINE

## 2021-11-03 PROCEDURE — 99214 OFFICE O/P EST MOD 30 MIN: CPT | Mod: 25 | Performed by: INTERNAL MEDICINE

## 2021-11-03 PROCEDURE — 3077F SYST BP >= 140 MM HG: CPT | Performed by: INTERNAL MEDICINE

## 2021-11-03 PROCEDURE — 90471 IMMUNIZATION ADMIN: CPT | Performed by: INTERNAL MEDICINE

## 2021-11-03 RX ORDER — CANDESARTAN CILEXETIL AND HYDROCHLOROTHIAZIDE 32; 12.5 MG/1; MG/1
1 TABLET ORAL
Qty: 90 TABLET | Refills: 1 | Status: SHIPPED | OUTPATIENT
Start: 2021-11-03 | End: 2022-01-02 | Stop reason: SDUPTHER

## 2021-11-03 RX ORDER — AMLODIPINE BESYLATE 5 MG/1
5 TABLET ORAL DAILY
Qty: 90 TABLET | Refills: 1 | Status: SHIPPED | OUTPATIENT
Start: 2021-11-03 | End: 2022-01-02 | Stop reason: SDUPTHER

## 2021-11-03 RX ORDER — DEXTROAMPHETAMINE SACCHARATE, AMPHETAMINE ASPARTATE MONOHYDRATE, DEXTROAMPHETAMINE SULFATE AND AMPHETAMINE SULFATE 7.5; 7.5; 7.5; 7.5 MG/1; MG/1; MG/1; MG/1
30 CAPSULE, EXTENDED RELEASE ORAL EVERY MORNING
Qty: 30 CAPSULE | Refills: 0 | Status: SHIPPED | OUTPATIENT
Start: 2021-11-03 | End: 2021-12-06 | Stop reason: SDUPTHER

## 2021-11-03 ASSESSMENT — ENCOUNTER SYMPTOMS
SHORTNESS OF BREATH: 0
VISUAL CHANGE: 0
COUGH: 0
HYPERTENSION: 1
WEAKNESS: 0
MYALGIAS: 0
BLURRED VISION: 0
DIZZINESS: 0
APPETITE CHANGE: 0
CHEST TIGHTNESS: 0
NUMBNESS: 0
JOINT SWELLING: 0
ARTHRALGIAS: 1
NECK PAIN: 0
ANOREXIA: 0
NERVOUS/ANXIOUS: 0
FATIGUE: 0
HEMATURIA: 0
HEADACHES: 0
BACK PAIN: 0
BLOOD IN STOOL: 0
ACTIVITY CHANGE: 0
LIGHT-HEADEDNESS: 0
FREQUENCY: 0
PALPITATIONS: 0
SLEEP DISTURBANCE: 0

## 2021-11-03 NOTE — ASSESSMENT & PLAN NOTE
On Adderall --  BP still high.   Has had increased salt sensitivity.   On Atacand-HCT and beta blocker.  HR 59.  Plan  Add novasc.  Bring cuff in, keep record.

## 2021-11-03 NOTE — PROGRESS NOTES
[facility] in [place]  Dr. Gilda Wyane  [address], [address]  [place] [address]  Phone: [phone]  Fax: [phone]        Patient ID: Riki Bundy                              : 1965    Visit Date: 11/3/2021    Chief Complaint: Med Management      Patient ID: Riki Bundy is a 56 y.o. male.    HPI  Follow up -    Doing yardwork in new house--   Lost a lot of weight.  Was pulling something out of ground - 3 weeks ago - and Right middle finger got pulled out of socket.   Went back to work.  Now with pain and swelling.     HTN.   Not checking at home.   Stressful day.   Has been on BB in past. Hard time running.   Tried CCB.  Had been on ACEI.     ADHD stable. Very active. No problems.     Left elbow pain-- resolved.       Hypertension  This is a chronic problem. The current episode started more than 1 year ago. The problem is unchanged. The problem is uncontrolled. Pertinent negatives include no anxiety, blurred vision, chest pain, headaches, malaise/fatigue, neck pain, palpitations, peripheral edema or shortness of breath. Risk factors for coronary artery disease include male gender, family history and dyslipidemia. Past treatments include angiotensin blockers and diuretics. The current treatment provides moderate improvement. Compliance problems include diet and exercise.  There is no history of angina or CAD/MI.   ADHD  This is a chronic problem. The current episode started more than 1 year ago. Associated symptoms include arthralgias (right hand, back). Pertinent negatives include no anorexia, chest pain, congestion, coughing, fatigue, headaches, joint swelling, myalgias, neck pain, numbness, visual change or weakness. The treatment provided significant relief.         Patient Active Problem List   Diagnosis   • Hypercholesteremia   • Essential hypertension, benign   • Acute allergic rhinitis   • ADHD, predominantly inattentive type   • Displacement of lumbar  intervertebral disc without myelopathy   • Acute pain of left knee   • Sebaceous cyst of right axilla   • Primary osteoarthritis   • Adenomatous colon polyp   • High blood sugar   • Hand injury, right, initial encounter   • FH: CAD (coronary artery disease)       Past Medical History:   Diagnosis Date   • Acute allergic rhinitis    • ADHD (attention deficit hyperactivity disorder), inattentive type    • Displacement of lumbar intervertebral disc without myelopathy    • Essential hypertension, benign    • Hypercholesteremia    • Panic disorder without agoraphobia        Past Surgical History:   Procedure Laterality Date   • MENISCECTOMY  1982         Current Outpatient Medications:   •  amphetamine-dextroamphetamine XR 30 mg 24 hr capsule, Take 1 capsule (30 mg total) by mouth every morning., Disp: 30 capsule, Rfl: 0  •  candesartan-hydrochlorothiazid 32-12.5 mg per tablet, Take 1 tablet by mouth once daily., Disp: 90 tablet, Rfl: 1  •  CRESTOR 10 mg tablet, Take 1 tablet (10 mg total) by mouth once daily., Disp: 90 tablet, Rfl: 3  •  levocetirizine (XYZAL) 5 mg tablet, Take 1 tablet (5 mg total) by mouth every evening. (Patient taking differently: Take 5 mg by mouth as needed.  ), Disp: 90 tablet, Rfl: 1  •  metoprolol succinate XL (TOPROL-XL) 25 mg 24 hr tablet, Take 1 tablet (25 mg total) by mouth daily., Disp: 90 tablet, Rfl: 1  •  naproxen (NAPROSYN) 500 mg tablet, Take 1 tablet (500 mg total) by mouth 2 (two) times a day with meals. (Patient taking differently: Take 500 mg by mouth as needed.  ), Disp: 60 tablet, Rfl: 1  •  amLODIPine 5 mg tablet, Take 1 tablet (5 mg total) by mouth daily., Disp: 90 tablet, Rfl: 1    Allergies   Allergen Reactions   • Sulfa (Sulfonamide Antibiotics) Other (see comments)     Get fever to the point of fainting   • Cephalosporins Rash       Family History   Problem Relation Age of Onset   • Lupus Biological Mother    • Vasculitis Biological Mother    • Kidney disease Biological  Mother    • Deep vein thrombosis Biological Father    • Heart disease Biological Father    • No Known Problems Biological Brother        Social History     Tobacco Use   • Smoking status: Never Smoker   • Smokeless tobacco: Never Used   Substance Use Topics   • Alcohol use: Yes     Alcohol/week: 28.0 standard drinks     Types: 28 Cans of beer per week   • Drug use: Never       Health Maintenance   Topic Date Due   • DTaP, Tdap, and Td Vaccines (2 - Td or Tdap) 05/02/2021   • Influenza Vaccine (1) 08/01/2021   • Zoster Vaccine (1 of 2) 02/05/2022 (Originally 2/9/2014)   • HIV Screening  02/05/2022 (Originally 2/9/1978)   • Colonoscopy  03/11/2031   • Hepatitis C Screening  Completed   • COVID-19 Vaccine  Completed   • Meningococcal ACWY  Aged Out   • HIB Vaccines  Aged Out   • IPV Vaccines  Aged Out   • HPV Vaccines  Aged Out   • Pneumococcal  Aged Out         The following have been reviewed and updated as appropriate in this visit:  Allergies  Meds  Problems         Review of System  Review of Systems   Constitutional: Negative for activity change, appetite change, fatigue and malaise/fatigue.   HENT: Negative for congestion.    Eyes: Negative for blurred vision.   Respiratory: Negative for cough, chest tightness and shortness of breath.    Cardiovascular: Negative for chest pain, palpitations and leg swelling.   Gastrointestinal: Negative for anorexia and blood in stool.   Genitourinary: Negative for frequency and hematuria.   Musculoskeletal: Positive for arthralgias (right hand, back). Negative for back pain, gait problem, joint swelling, myalgias and neck pain.   Neurological: Negative for dizziness, weakness, light-headedness, numbness and headaches.   Psychiatric/Behavioral: Negative for sleep disturbance. The patient is not nervous/anxious.        Objective     Vitals  Vitals:    11/03/21 1543   BP: (!) 152/100   BP Location: Left upper arm   Patient Position: Sitting   Pulse: (!) 59   Temp: 36.9 °C (98.4  "°F)   SpO2: 98%   Weight: 81.9 kg (180 lb 9.6 oz)   Height: 1.803 m (5' 11\")       Body mass index is 25.19 kg/m².    Physical Exam  Physical Exam  Vitals reviewed.   Constitutional:       General: He is not in acute distress.     Appearance: He is well-developed. He is not diaphoretic.   HENT:      Head: Normocephalic and atraumatic.      Right Ear: External ear normal.      Left Ear: External ear normal.      Nose: Nose normal.   Eyes:      General: No scleral icterus.        Right eye: No discharge.         Left eye: No discharge.      Conjunctiva/sclera: Conjunctivae normal.   Neck:      Thyroid: No thyromegaly.      Vascular: No JVD.      Trachea: No tracheal deviation.   Cardiovascular:      Rate and Rhythm: Normal rate and regular rhythm.      Pulses: Normal pulses.      Heart sounds: Normal heart sounds. No murmur heard.  No friction rub.   Pulmonary:      Effort: Pulmonary effort is normal. No respiratory distress.      Breath sounds: Normal breath sounds. No wheezing or rales.   Chest:      Chest wall: No tenderness.   Abdominal:      General: There is no distension.      Palpations: Abdomen is soft.      Tenderness: There is no abdominal tenderness. There is no guarding.   Musculoskeletal:         General: Signs of injury (right finher 3 MCP foint swelling) present. No tenderness or deformity. Normal range of motion.      Cervical back: Normal range of motion and neck supple.      Right lower leg: No edema.      Left lower leg: No edema.   Skin:     General: Skin is warm and dry.      Coloration: Skin is not pale.      Findings: No erythema or rash.   Neurological:      Mental Status: He is alert and oriented to person, place, and time.      Cranial Nerves: No cranial nerve deficit.      Sensory: No sensory deficit.      Motor: No abnormal muscle tone.   Psychiatric:         Behavior: Behavior normal.         Thought Content: Thought content normal.         Judgment: Judgment normal. "         Assessment/Plan     Problem List Items Addressed This Visit        Unprioritized    ADHD, predominantly inattentive type (Chronic)     Reviewed PDMP. No red flags.   Tolerating medication well with no side effects.              Relevant Medications    amphetamine-dextroamphetamine XR 30 mg 24 hr capsule    Essential hypertension, benign     On Adderall --  BP still high.   Has had increased salt sensitivity.   On Atacand-HCT and beta blocker.  HR 59.  Plan  Add novasc.  Bring cuff in, keep record.              Relevant Medications    amLODIPine 5 mg tablet    candesartan-hydrochlorothiazid 32-12.5 mg per tablet    Hand injury, right, initial encounter     Pulled out of socket-- R middle finger.  Went back in.  Still with swelling and edema.  Plan   X ray.         Relevant Orders    X-RAY HAND RIGHT 3+ VIEWS    High blood sugar     On crestor.  Tolerating medication well with no side effects.            Relevant Orders    Hemoglobin A1c    Hypercholesteremia      Other Visit Diagnoses     Need for vaccination    -  Primary    Relevant Orders    Influenza vaccine quadrivalent preservative free 6 mon and older IM (FluLaval)    Encounter for preventative adult health care examination        Relevant Orders    CBC and Differential    Comprehensive metabolic panel    Lipid panel    TSH w reflex FT4    Urinalysis with microscopic          Return in about 3 months (around 2/3/2022).      Gilda Wayne MD  11/3/2021

## 2021-11-03 NOTE — ASSESSMENT & PLAN NOTE
Pulled out of socket-- R middle finger.  Went back in.  Still with swelling and edema.  Plan   X ray.

## 2021-12-06 DIAGNOSIS — F90.0 ADHD, PREDOMINANTLY INATTENTIVE TYPE: Chronic | ICD-10-CM

## 2021-12-07 RX ORDER — DEXTROAMPHETAMINE SACCHARATE, AMPHETAMINE ASPARTATE MONOHYDRATE, DEXTROAMPHETAMINE SULFATE AND AMPHETAMINE SULFATE 7.5; 7.5; 7.5; 7.5 MG/1; MG/1; MG/1; MG/1
30 CAPSULE, EXTENDED RELEASE ORAL EVERY MORNING
Qty: 30 CAPSULE | Refills: 0 | Status: SHIPPED | OUTPATIENT
Start: 2021-12-07 | End: 2022-01-02 | Stop reason: SDUPTHER

## 2021-12-07 NOTE — TELEPHONE ENCOUNTER
Medicine Refill Request    Last Office Visit: 11/3/2021  Last Telemedicine Visit: 9/28/2021 Gilda Wayne MD    Next Office Visit: 1/26/2022  Next Telemedicine Visit: Visit date not found         Current Outpatient Medications:   •  amLODIPine 5 mg tablet, Take 1 tablet (5 mg total) by mouth daily., Disp: 90 tablet, Rfl: 1  •  amphetamine-dextroamphetamine XR 30 mg 24 hr capsule, Take 1 capsule (30 mg total) by mouth every morning., Disp: 30 capsule, Rfl: 0  •  candesartan-hydrochlorothiazid 32-12.5 mg per tablet, Take 1 tablet by mouth once daily., Disp: 90 tablet, Rfl: 1  •  CRESTOR 10 mg tablet, Take 1 tablet (10 mg total) by mouth once daily., Disp: 90 tablet, Rfl: 3  •  levocetirizine (XYZAL) 5 mg tablet, Take 1 tablet (5 mg total) by mouth every evening. (Patient taking differently: Take 5 mg by mouth as needed.  ), Disp: 90 tablet, Rfl: 1  •  metoprolol succinate XL (TOPROL-XL) 25 mg 24 hr tablet, Take 1 tablet (25 mg total) by mouth daily., Disp: 90 tablet, Rfl: 1  •  naproxen (NAPROSYN) 500 mg tablet, Take 1 tablet (500 mg total) by mouth 2 (two) times a day with meals. (Patient taking differently: Take 500 mg by mouth as needed.  ), Disp: 60 tablet, Rfl: 1      BP Readings from Last 3 Encounters:   11/03/21 (!) 152/100   06/02/21 (!) 162/92   12/20/19 132/88       Recent Lab results:  Lab Results   Component Value Date    CHOL 134 02/04/2020   ,   Lab Results   Component Value Date    HDL 48 02/04/2020   ,   Lab Results   Component Value Date    LDLCALC 69 02/04/2020   ,   Lab Results   Component Value Date    TRIG 90 02/04/2020        Lab Results   Component Value Date    GLUCOSE 112 (H) 02/04/2020   , No results found for: HGBA1C      Lab Results   Component Value Date    CREATININE 0.96 02/04/2020       No results found for: TSH

## 2022-01-02 DIAGNOSIS — F90.0 ADHD, PREDOMINANTLY INATTENTIVE TYPE: Chronic | ICD-10-CM

## 2022-01-02 DIAGNOSIS — I10 ESSENTIAL HYPERTENSION, BENIGN: ICD-10-CM

## 2022-01-03 RX ORDER — DEXTROAMPHETAMINE SACCHARATE, AMPHETAMINE ASPARTATE MONOHYDRATE, DEXTROAMPHETAMINE SULFATE AND AMPHETAMINE SULFATE 7.5; 7.5; 7.5; 7.5 MG/1; MG/1; MG/1; MG/1
30 CAPSULE, EXTENDED RELEASE ORAL EVERY MORNING
Qty: 30 CAPSULE | Refills: 0 | Status: SHIPPED | OUTPATIENT
Start: 2022-01-03 | End: 2022-02-02 | Stop reason: SDUPTHER

## 2022-01-03 RX ORDER — AMLODIPINE BESYLATE 5 MG/1
5 TABLET ORAL DAILY
Qty: 90 TABLET | Refills: 1 | Status: SHIPPED | OUTPATIENT
Start: 2022-01-03 | End: 2022-01-26 | Stop reason: SDUPTHER

## 2022-01-03 RX ORDER — CANDESARTAN CILEXETIL AND HYDROCHLOROTHIAZIDE 32; 12.5 MG/1; MG/1
1 TABLET ORAL
Qty: 90 TABLET | Refills: 1 | Status: SHIPPED | OUTPATIENT
Start: 2022-01-03 | End: 2022-01-26

## 2022-01-03 NOTE — TELEPHONE ENCOUNTER
Medicine Refill Request    Last Office: 11/3/2021   Last Consult Visit: Visit date not found  Last Telemedicine Visit: 9/28/2021 Gilda Wayne MD    Next Appointment: 1/26/2022      Current Outpatient Medications:   •  amLODIPine 5 mg tablet, Take 1 tablet (5 mg total) by mouth daily., Disp: 90 tablet, Rfl: 1  •  amphetamine-dextroamphetamine XR 30 mg 24 hr capsule, Take 1 capsule (30 mg total) by mouth every morning., Disp: 30 capsule, Rfl: 0  •  candesartan-hydrochlorothiazid 32-12.5 mg per tablet, Take 1 tablet by mouth once daily., Disp: 90 tablet, Rfl: 1  •  CRESTOR 10 mg tablet, Take 1 tablet (10 mg total) by mouth once daily., Disp: 90 tablet, Rfl: 3  •  levocetirizine (XYZAL) 5 mg tablet, Take 1 tablet (5 mg total) by mouth every evening. (Patient taking differently: Take 5 mg by mouth as needed.  ), Disp: 90 tablet, Rfl: 1  •  metoprolol succinate XL (TOPROL-XL) 25 mg 24 hr tablet, Take 1 tablet (25 mg total) by mouth daily., Disp: 90 tablet, Rfl: 1  •  naproxen (NAPROSYN) 500 mg tablet, Take 1 tablet (500 mg total) by mouth 2 (two) times a day with meals. (Patient taking differently: Take 500 mg by mouth as needed.  ), Disp: 60 tablet, Rfl: 1      BP Readings from Last 3 Encounters:   11/03/21 (!) 152/100   06/02/21 (!) 162/92   12/20/19 132/88       Recent Lab results:  Lab Results   Component Value Date    CHOL 134 02/04/2020   ,   Lab Results   Component Value Date    HDL 48 02/04/2020   ,   Lab Results   Component Value Date    LDLCALC 69 02/04/2020   ,   Lab Results   Component Value Date    TRIG 90 02/04/2020        Lab Results   Component Value Date    GLUCOSE 112 (H) 02/04/2020   , No results found for: HGBA1C      Lab Results   Component Value Date    CREATININE 0.96 02/04/2020       No results found for: TSH

## 2022-01-26 ENCOUNTER — TELEMEDICINE (OUTPATIENT)
Dept: PRIMARY CARE | Facility: CLINIC | Age: 57
End: 2022-01-26
Payer: COMMERCIAL

## 2022-01-26 ENCOUNTER — TELEPHONE (OUTPATIENT)
Dept: PRIMARY CARE | Facility: CLINIC | Age: 57
End: 2022-01-26

## 2022-01-26 DIAGNOSIS — F90.0 ADHD, PREDOMINANTLY INATTENTIVE TYPE: Chronic | ICD-10-CM

## 2022-01-26 DIAGNOSIS — I10 ESSENTIAL HYPERTENSION, BENIGN: Primary | ICD-10-CM

## 2022-01-26 DIAGNOSIS — E78.00 HYPERCHOLESTEREMIA: ICD-10-CM

## 2022-01-26 DIAGNOSIS — Z00.00 ENCOUNTER FOR PREVENTATIVE ADULT HEALTH CARE EXAMINATION: ICD-10-CM

## 2022-01-26 DIAGNOSIS — R73.9 HIGH BLOOD SUGAR: ICD-10-CM

## 2022-01-26 DIAGNOSIS — M19.91 PRIMARY OSTEOARTHRITIS, UNSPECIFIED SITE: ICD-10-CM

## 2022-01-26 DIAGNOSIS — M51.26 DISPLACEMENT OF LUMBAR INTERVERTEBRAL DISC WITHOUT MYELOPATHY: Chronic | ICD-10-CM

## 2022-01-26 PROBLEM — S69.91XA HAND INJURY, RIGHT, INITIAL ENCOUNTER: Status: RESOLVED | Noted: 2021-11-03 | Resolved: 2022-01-26

## 2022-01-26 PROCEDURE — 99214 OFFICE O/P EST MOD 30 MIN: CPT | Mod: 95 | Performed by: INTERNAL MEDICINE

## 2022-01-26 RX ORDER — AMLODIPINE BESYLATE 5 MG/1
5 TABLET ORAL DAILY
Qty: 90 TABLET | Refills: 1 | Status: SHIPPED | OUTPATIENT
Start: 2022-01-26 | End: 2022-12-13 | Stop reason: SDUPTHER

## 2022-01-26 RX ORDER — METOPROLOL SUCCINATE 25 MG/1
25 TABLET, EXTENDED RELEASE ORAL DAILY
Qty: 90 TABLET | Refills: 1 | Status: SHIPPED | OUTPATIENT
Start: 2022-01-26 | End: 2022-06-22

## 2022-01-26 RX ORDER — CANDESARTAN CILEXETIL AND HYDROCHLOROTHIAZIDE 32; 12.5 MG/1; MG/1
1 TABLET ORAL
Qty: 90 TABLET | Refills: 1 | Status: SHIPPED | OUTPATIENT
Start: 2022-01-26 | End: 2022-12-13 | Stop reason: SDUPTHER

## 2022-01-26 RX ORDER — NAPROXEN 500 MG/1
500 TABLET ORAL AS NEEDED
Qty: 90 TABLET | Refills: 1 | Status: SHIPPED | OUTPATIENT
Start: 2022-01-26 | End: 2022-06-22 | Stop reason: ALTCHOICE

## 2022-01-26 ASSESSMENT — ENCOUNTER SYMPTOMS
HEADACHES: 0
ARTHRALGIAS: 1
LIGHT-HEADEDNESS: 0
SLEEP DISTURBANCE: 0
DECREASED CONCENTRATION: 0
DYSPHORIC MOOD: 0
BACK PAIN: 1
FATIGUE: 0
VISUAL CHANGE: 0
MYALGIAS: 0
APPETITE CHANGE: 0
HYPERTENSION: 1
ACTIVITY CHANGE: 0
CHEST TIGHTNESS: 0
NERVOUS/ANXIOUS: 0
COUGH: 0
DIZZINESS: 0
ANOREXIA: 0
SHORTNESS OF BREATH: 0

## 2022-01-26 NOTE — ASSESSMENT & PLAN NOTE
on last labs.  Drinking beer at beach--  Discussed to limit excess carbs. Is already cutting back on heavy beers.  Check A1c.

## 2022-01-26 NOTE — TELEPHONE ENCOUNTER
Clarification on napoxen script frequency was not included in script.  do you want 500mg prn bid for pain or 500mg daily? Please clarify

## 2022-01-26 NOTE — PROGRESS NOTES
Verification of Patient Location:  The patient affirms they are currently located in the following state: [place]    Request for Consent:    Audio and Video Encounter   Sarika, my name is Gilda Wayne MD.  Before we proceed, can you please verify your identification by telling me your full name and date of birth?  Can you tell me who is in the room with you?    You and I are about to have a telemedicine check-in or visit because you have requested it.  This is a live video-conference.  I am a real person, speaking to you in real time.  There is no one else with me on the video-conference.  However, when we use (All My Data, ViewReple, etc) it is important for you to know that the video-conference may not be secure or private.  I am not recording this conversation and I am asking you not to record it.  This telemedicine visit will be billed to your health insurance or you, if you are self-insured.  You understand you will be responsible for any copayments or coinsurances that apply to your telemedicine visit.  Communication platform used for this encounter:  Pull Video Visit (with Zoom integration)     Before starting our telemedicine visit, I am required to get your consent for this virtual check-in or visit by telemedicine. Do you consent?      Patient Response to Request for Consent:  Yes      Visit Documentation:  Subjective     Patient ID: Riki Bundy is a 56 y.o. male.  1965      [facility] visit--    Has maintained weight over holidays.    HTN.  /81 at home.    ADHD.  Stable on meds.     Hyperchol-  On crestor. FH CAD. Dad with CAD and carotid dis.  reviewd labs from 2/2021. Due for recheck.     High BG on that labs. 2/21.    Family did get Omicron.He was tested.  Had been at [facility]-- was positive.   Has had vaccinations, booster.    ADHD  This is a chronic problem. The current episode started more than 1 year ago. The problem has been unchanged. Associated symptoms include arthralgias.  Pertinent negatives include no anorexia, chest pain, congestion, coughing, fatigue, headaches, myalgias, urinary symptoms or visual change.   Hypertension  This is a chronic problem. The current episode started more than 1 year ago. The problem is controlled. Pertinent negatives include no chest pain, headaches or shortness of breath. Agents associated with hypertension include NSAIDs. Risk factors for coronary artery disease include dyslipidemia, family history and male gender. Past treatments include calcium channel blockers, beta blockers and angiotensin blockers. The current treatment provides significant improvement. There are no compliance problems.        The following have been reviewed and updated as appropriate in this visit:   Tobacco  Allergies  Meds  Problems  Med Hx  Surg Hx  Fam Hx       Review of Systems   Constitutional: Negative for activity change, appetite change and fatigue.   HENT: Negative for congestion.    Respiratory: Negative for cough, chest tightness and shortness of breath.    Cardiovascular: Negative for chest pain.   Gastrointestinal: Negative for anorexia.   Musculoskeletal: Positive for arthralgias and back pain. Negative for gait problem and myalgias.   Neurological: Negative for dizziness, light-headedness and headaches.   Psychiatric/Behavioral: Negative for decreased concentration, dysphoric mood and sleep disturbance. The patient is not nervous/anxious.      Video Exam:  Atraumatic, normocephalic.  Eyes no discharge, redness; ears, nose visually normal.   Breathing comfortably, talking in complete sentences.   No obvious distress. No wheezing.   Mental status normal, visible cranial nerves normal.  No accessory muscle use.       Assessment/Plan   Diagnoses and all orders for this visit:    Essential hypertension, benign (Primary)  Assessment & Plan:  On Atacand-HCT 32/12.5, Metoprolol 25 XL, Norvasc 5 mg.  BP has been stable at home-- using new cuff.  Plan  Cont  same.  Bring cuff in, keep record.         Orders:  -     metoprolol succinate XL 25 mg 24 hr tablet; Take 1 tablet (25 mg total) by mouth daily.  -     amLODIPine 5 mg tablet; Take 1 tablet (5 mg total) by mouth daily.  -     candesartan-hydrochlorothiazid 32-12.5 mg per tablet; Take 1 tablet by mouth once daily.    High blood sugar  Assessment & Plan:   on last labs.  Drinking beer at beach--  Discussed to limit excess carbs. Is already cutting back on heavy beers.  Check A1c.    Orders:  -     Hemoglobin A1c; Future    ADHD, predominantly inattentive type  Assessment & Plan:  On Adderall.  Stable on current dose.  Reviewed PDMP. No red flags.   Tolerating medication well with no side effects.           Primary osteoarthritis, unspecified site  Assessment & Plan:  Lower back, bad knees.  Hx ruptured disc in lumbar-sacral L5-S1.  occ numbnes in toe.  Not seen spine MD recently.  Needs refill NSAIDs.      Orders:  -     naproxen 500 mg tablet; Take 1 tablet (500 mg total) by mouth as needed for moderate pain.    Displacement of lumbar intervertebral disc without myelopathy  Assessment & Plan:  See OA.      Encounter for preventative adult health care examination  -     CBC and Differential; Future  -     Comprehensive metabolic panel; Future  -     Lipid panel; Future  -     Urinalysis with Reflex Culture (ED and Outpatient only); Future  -     TSH w reflex FT4; Future    Hypercholesteremia  Assessment & Plan:  On crestor.  Tolerating medication well with no side effects.   Labs ROV>          Time Spent:  I spent 30 minutes on this date of service performing the following activities: obtaining history, entering orders, documenting, providing counseling and education and independently reviewing study/studies.

## 2022-01-26 NOTE — ASSESSMENT & PLAN NOTE
On Atacand-HCT 32/12.5, Metoprolol 25 XL, Norvasc 5 mg.  BP has been stable at home-- using new cuff.  Plan  Cont same.  Bring cuff in, keep record.

## 2022-01-26 NOTE — ASSESSMENT & PLAN NOTE
On Adderall.  Stable on current dose.  Reviewed PDMP. No red flags.   Tolerating medication well with no side effects.

## 2022-01-26 NOTE — ASSESSMENT & PLAN NOTE
Lower back, bad knees.  Hx ruptured disc in lumbar-sacral L5-S1.  occ numbnes in toe.  Not seen spine MD recently.  Needs refill NSAIDs.

## 2022-02-02 DIAGNOSIS — F90.0 ADHD, PREDOMINANTLY INATTENTIVE TYPE: Chronic | ICD-10-CM

## 2022-02-02 RX ORDER — DEXTROAMPHETAMINE SACCHARATE, AMPHETAMINE ASPARTATE MONOHYDRATE, DEXTROAMPHETAMINE SULFATE AND AMPHETAMINE SULFATE 7.5; 7.5; 7.5; 7.5 MG/1; MG/1; MG/1; MG/1
30 CAPSULE, EXTENDED RELEASE ORAL EVERY MORNING
Qty: 30 CAPSULE | Refills: 0 | Status: SHIPPED | OUTPATIENT
Start: 2022-02-02 | End: 2022-03-09 | Stop reason: SDUPTHER

## 2022-02-02 NOTE — TELEPHONE ENCOUNTER
Medicine Refill Request    Last Office: 11/3/2021   Last Consult Visit: Visit date not found  Last Telemedicine Visit: 1/26/2022 Gilda Wayne MD    Next Appointment: Visit date not found      Current Outpatient Medications:   •  amLODIPine 5 mg tablet, Take 1 tablet (5 mg total) by mouth daily., Disp: 90 tablet, Rfl: 1  •  amphetamine-dextroamphetamine XR 30 mg 24 hr capsule, Take 1 capsule (30 mg total) by mouth every morning., Disp: 30 capsule, Rfl: 0  •  candesartan-hydrochlorothiazid 32-12.5 mg per tablet, Take 1 tablet by mouth once daily., Disp: 90 tablet, Rfl: 1  •  CRESTOR 10 mg tablet, Take 1 tablet (10 mg total) by mouth once daily., Disp: 90 tablet, Rfl: 3  •  levocetirizine (XYZAL) 5 mg tablet, Take 1 tablet (5 mg total) by mouth every evening. (Patient taking differently: Take 5 mg by mouth as needed.  ), Disp: 90 tablet, Rfl: 1  •  metoprolol succinate XL 25 mg 24 hr tablet, Take 1 tablet (25 mg total) by mouth daily., Disp: 90 tablet, Rfl: 1  •  naproxen 500 mg tablet, Take 1 tablet (500 mg total) by mouth as needed for moderate pain., Disp: 90 tablet, Rfl: 1      BP Readings from Last 3 Encounters:   11/03/21 (!) 152/100   06/02/21 (!) 162/92   12/20/19 132/88       Recent Lab results:  Lab Results   Component Value Date    CHOL 134 02/04/2020   ,   Lab Results   Component Value Date    HDL 48 02/04/2020   ,   Lab Results   Component Value Date    LDLCALC 69 02/04/2020   ,   Lab Results   Component Value Date    TRIG 90 02/04/2020        Lab Results   Component Value Date    GLUCOSE 112 (H) 02/04/2020   , No results found for: HGBA1C      Lab Results   Component Value Date    CREATININE 0.96 02/04/2020       No results found for: TSH

## 2022-03-09 DIAGNOSIS — F90.0 ADHD, PREDOMINANTLY INATTENTIVE TYPE: Chronic | ICD-10-CM

## 2022-03-09 RX ORDER — DEXTROAMPHETAMINE SACCHARATE, AMPHETAMINE ASPARTATE MONOHYDRATE, DEXTROAMPHETAMINE SULFATE AND AMPHETAMINE SULFATE 7.5; 7.5; 7.5; 7.5 MG/1; MG/1; MG/1; MG/1
30 CAPSULE, EXTENDED RELEASE ORAL EVERY MORNING
Qty: 30 CAPSULE | Refills: 0 | Status: SHIPPED | OUTPATIENT
Start: 2022-03-09 | End: 2022-04-06 | Stop reason: SDUPTHER

## 2022-03-09 NOTE — TELEPHONE ENCOUNTER
Medicine Refill Request    Last Office: 11/3/2021   Last Consult Visit: Visit date not found  Last Telemedicine Visit: 1/26/2022 Gilda Wayne MD    Next Appointment: Visit date not found      Current Outpatient Medications:   •  amLODIPine 5 mg tablet, Take 1 tablet (5 mg total) by mouth daily., Disp: 90 tablet, Rfl: 1  •  amphetamine-dextroamphetamine XR (ADDERALL XR) 30 mg 24 hr capsule, Take 1 capsule (30 mg total) by mouth every morning., Disp: 30 capsule, Rfl: 0  •  candesartan-hydrochlorothiazid 32-12.5 mg per tablet, Take 1 tablet by mouth once daily., Disp: 90 tablet, Rfl: 1  •  CRESTOR 10 mg tablet, Take 1 tablet (10 mg total) by mouth once daily., Disp: 90 tablet, Rfl: 3  •  levocetirizine (XYZAL) 5 mg tablet, Take 1 tablet (5 mg total) by mouth every evening. (Patient taking differently: Take 5 mg by mouth as needed.  ), Disp: 90 tablet, Rfl: 1  •  metoprolol succinate XL 25 mg 24 hr tablet, Take 1 tablet (25 mg total) by mouth daily., Disp: 90 tablet, Rfl: 1  •  naproxen 500 mg tablet, Take 1 tablet (500 mg total) by mouth as needed for moderate pain., Disp: 90 tablet, Rfl: 1      BP Readings from Last 3 Encounters:   11/03/21 (!) 152/100   06/02/21 (!) 162/92   12/20/19 132/88       Recent Lab results:  Lab Results   Component Value Date    CHOL 134 02/04/2020   ,   Lab Results   Component Value Date    HDL 48 02/04/2020   ,   Lab Results   Component Value Date    LDLCALC 69 02/04/2020   ,   Lab Results   Component Value Date    TRIG 90 02/04/2020        Lab Results   Component Value Date    GLUCOSE 112 (H) 02/04/2020   , No results found for: HGBA1C      Lab Results   Component Value Date    CREATININE 0.96 02/04/2020       No results found for: TSH

## 2022-05-03 DIAGNOSIS — F90.0 ADHD, PREDOMINANTLY INATTENTIVE TYPE: Chronic | ICD-10-CM

## 2022-05-04 LAB
ALBUMIN SERPL-MCNC: 4.5 G/DL (ref 3.6–5.1)
ALBUMIN/GLOB SERPL: 1.6 (CALC) (ref 1–2.5)
ALP SERPL-CCNC: 77 U/L (ref 35–144)
ALT SERPL-CCNC: 22 U/L (ref 9–46)
APPEARANCE UR: CLEAR
AST SERPL-CCNC: 21 U/L (ref 10–35)
BACTERIA #/AREA URNS HPF: NORMAL /HPF
BACTERIA UR CULT: NORMAL
BASOPHILS # BLD AUTO: 31 CELLS/UL (ref 0–200)
BASOPHILS NFR BLD AUTO: 0.6 %
BILIRUB SERPL-MCNC: 0.3 MG/DL (ref 0.2–1.2)
BILIRUB UR QL STRIP: NEGATIVE
BUN SERPL-MCNC: 20 MG/DL (ref 7–25)
BUN/CREAT SERPL: ABNORMAL (CALC) (ref 6–22)
CALCIUM SERPL-MCNC: 9.3 MG/DL (ref 8.6–10.3)
CHLORIDE SERPL-SCNC: 97 MMOL/L (ref 98–110)
CHOLEST SERPL-MCNC: 240 MG/DL
CHOLEST/HDLC SERPL: 8 (CALC)
CO2 SERPL-SCNC: 30 MMOL/L (ref 20–32)
COLOR UR: YELLOW
CREAT SERPL-MCNC: 0.88 MG/DL (ref 0.7–1.33)
EOSINOPHIL # BLD AUTO: 83 CELLS/UL (ref 15–500)
EOSINOPHIL NFR BLD AUTO: 1.6 %
ERYTHROCYTE [DISTWIDTH] IN BLOOD BY AUTOMATED COUNT: 12.2 % (ref 11–15)
GLOBULIN SER CALC-MCNC: 2.8 G/DL (CALC) (ref 1.9–3.7)
GLUCOSE SERPL-MCNC: 121 MG/DL (ref 65–99)
GLUCOSE UR QL STRIP: NEGATIVE
HBA1C MFR BLD: 5.5 % OF TOTAL HGB
HCT VFR BLD AUTO: 40.4 % (ref 38.5–50)
HDLC SERPL-MCNC: 30 MG/DL
HGB BLD-MCNC: 13.7 G/DL (ref 13.2–17.1)
HGB UR QL STRIP: NEGATIVE
HYALINE CASTS #/AREA URNS LPF: NORMAL /LPF
KETONES UR QL STRIP: NEGATIVE
LDLC SERPL CALC-MCNC: ABNORMAL MG/DL (CALC)
LEUKOCYTE ESTERASE UR QL STRIP: NEGATIVE
LYMPHOCYTES # BLD AUTO: 1331 CELLS/UL (ref 850–3900)
LYMPHOCYTES NFR BLD AUTO: 25.6 %
MCH RBC QN AUTO: 29 PG (ref 27–33)
MCHC RBC AUTO-ENTMCNC: 33.9 G/DL (ref 32–36)
MCV RBC AUTO: 85.6 FL (ref 80–100)
MONOCYTES # BLD AUTO: 593 CELLS/UL (ref 200–950)
MONOCYTES NFR BLD AUTO: 11.4 %
NEUTROPHILS # BLD AUTO: 3162 CELLS/UL (ref 1500–7800)
NEUTROPHILS NFR BLD AUTO: 60.8 %
NITRITE UR QL STRIP: NEGATIVE
NONHDLC SERPL-MCNC: 210 MG/DL (CALC)
PH UR STRIP: NORMAL [PH] (ref 5–8)
PLATELET # BLD AUTO: 334 THOUSAND/UL (ref 140–400)
PMV BLD REES-ECKER: 10.5 FL (ref 7.5–12.5)
POTASSIUM SERPL-SCNC: 4.5 MMOL/L (ref 3.5–5.3)
PROT SERPL-MCNC: 7.3 G/DL (ref 6.1–8.1)
PROT UR QL STRIP: NEGATIVE
RBC # BLD AUTO: 4.72 MILLION/UL (ref 4.2–5.8)
RBC #/AREA URNS HPF: NORMAL /HPF
SODIUM SERPL-SCNC: 133 MMOL/L (ref 135–146)
SP GR UR STRIP: 1.02 (ref 1–1.03)
SQUAMOUS #/AREA URNS HPF: NORMAL /HPF
TRIGL SERPL-MCNC: 1102 MG/DL
TSH SERPL-ACNC: 4.41 MIU/L (ref 0.4–4.5)
WBC # BLD AUTO: 5.2 THOUSAND/UL (ref 3.8–10.8)
WBC #/AREA URNS HPF: NORMAL /HPF

## 2022-05-04 RX ORDER — DEXTROAMPHETAMINE SACCHARATE, AMPHETAMINE ASPARTATE MONOHYDRATE, DEXTROAMPHETAMINE SULFATE AND AMPHETAMINE SULFATE 7.5; 7.5; 7.5; 7.5 MG/1; MG/1; MG/1; MG/1
30 CAPSULE, EXTENDED RELEASE ORAL EVERY MORNING
Qty: 30 CAPSULE | Refills: 0 | Status: SHIPPED | OUTPATIENT
Start: 2022-05-04 | End: 2022-06-01 | Stop reason: SDUPTHER

## 2022-05-04 NOTE — TELEPHONE ENCOUNTER
Medicine Refill Request    Last Office: 11/3/2021   Last Consult Visit: Visit date not found  Last Telemedicine Visit: 1/26/2022 Gilda Wayne MD    Next Appointment: Visit date not found      Current Outpatient Medications:   •  amLODIPine 5 mg tablet, Take 1 tablet (5 mg total) by mouth daily., Disp: 90 tablet, Rfl: 1  •  amphetamine-dextroamphetamine XR (ADDERALL XR) 30 mg 24 hr capsule, Take 1 capsule (30 mg total) by mouth every morning., Disp: 30 capsule, Rfl: 0  •  candesartan-hydrochlorothiazid 32-12.5 mg per tablet, Take 1 tablet by mouth once daily., Disp: 90 tablet, Rfl: 1  •  CRESTOR 10 mg tablet, Take 1 tablet (10 mg total) by mouth once daily., Disp: 90 tablet, Rfl: 3  •  levocetirizine (XYZAL) 5 mg tablet, Take 1 tablet (5 mg total) by mouth every evening. (Patient taking differently: Take 5 mg by mouth as needed.  ), Disp: 90 tablet, Rfl: 1  •  metoprolol succinate XL 25 mg 24 hr tablet, Take 1 tablet (25 mg total) by mouth daily., Disp: 90 tablet, Rfl: 1  •  naproxen 500 mg tablet, Take 1 tablet (500 mg total) by mouth as needed for moderate pain., Disp: 90 tablet, Rfl: 1      BP Readings from Last 3 Encounters:   11/03/21 (!) 152/100   06/02/21 (!) 162/92   12/20/19 132/88       Recent Lab results:  Lab Results   Component Value Date    CHOL SEE COMMENT 05/03/2022   ,   Lab Results   Component Value Date    HDL SEE COMMENT 05/03/2022   ,   Lab Results   Component Value Date    LDLCALC SEE COMMENT 05/03/2022   ,   Lab Results   Component Value Date    TRIG SEE COMMENT 05/03/2022        Lab Results   Component Value Date    GLUCOSE SEE COMMENT 05/03/2022    GLUCOSE NEGATIVE 05/03/2022   ,   Lab Results   Component Value Date    HGBA1C 5.5 05/03/2022         Lab Results   Component Value Date    CREATININE SEE COMMENT 05/03/2022       Lab Results   Component Value Date    TSH 4.41 05/03/2022

## 2022-05-05 ENCOUNTER — TELEPHONE (OUTPATIENT)
Dept: PRIMARY CARE | Facility: CLINIC | Age: 57
End: 2022-05-05
Payer: COMMERCIAL

## 2022-05-05 DIAGNOSIS — E78.1 HYPERTRIGLYCERIDEMIA: Primary | ICD-10-CM

## 2022-05-05 NOTE — TELEPHONE ENCOUNTER
LMOM for pt to return call to review lab results. Will ask pt if he was fasting prior to labs lipids and glucose elevated and sodium low. Results left on your desk for review.

## 2022-05-06 NOTE — TELEPHONE ENCOUNTER
Have call pt and discussed with him--    Increase crestor to 20 QD.  Card referral.  STOP all alcohol.  Stop high fat, high carb diet.  Discussed risk of pancreatitis -- to ER asap if develop abdominal pain.  Early follow up with me.

## 2022-06-01 DIAGNOSIS — F90.0 ADHD, PREDOMINANTLY INATTENTIVE TYPE: Chronic | ICD-10-CM

## 2022-06-02 RX ORDER — DEXTROAMPHETAMINE SACCHARATE, AMPHETAMINE ASPARTATE MONOHYDRATE, DEXTROAMPHETAMINE SULFATE AND AMPHETAMINE SULFATE 7.5; 7.5; 7.5; 7.5 MG/1; MG/1; MG/1; MG/1
30 CAPSULE, EXTENDED RELEASE ORAL EVERY MORNING
Qty: 30 CAPSULE | Refills: 0 | Status: SHIPPED | OUTPATIENT
Start: 2022-06-02 | End: 2022-06-22 | Stop reason: SDUPTHER

## 2022-06-02 NOTE — TELEPHONE ENCOUNTER
Medicine Refill Request    Last Office: 11/3/2021   Last Consult Visit: Visit date not found  Last Telemedicine Visit: 1/26/2022 Gilda Wayne MD    Next Appointment: Visit date not found      Current Outpatient Medications:   •  amLODIPine 5 mg tablet, Take 1 tablet (5 mg total) by mouth daily., Disp: 90 tablet, Rfl: 1  •  amphetamine-dextroamphetamine XR (ADDERALL XR) 30 mg 24 hr capsule, Take 1 capsule (30 mg total) by mouth every morning., Disp: 30 capsule, Rfl: 0  •  candesartan-hydrochlorothiazid 32-12.5 mg per tablet, Take 1 tablet by mouth once daily., Disp: 90 tablet, Rfl: 1  •  CRESTOR 10 mg tablet, Take 1 tablet (10 mg total) by mouth once daily., Disp: 90 tablet, Rfl: 3  •  levocetirizine (XYZAL) 5 mg tablet, Take 1 tablet (5 mg total) by mouth every evening. (Patient taking differently: Take 5 mg by mouth as needed.  ), Disp: 90 tablet, Rfl: 1  •  metoprolol succinate XL 25 mg 24 hr tablet, Take 1 tablet (25 mg total) by mouth daily., Disp: 90 tablet, Rfl: 1  •  naproxen 500 mg tablet, Take 1 tablet (500 mg total) by mouth as needed for moderate pain., Disp: 90 tablet, Rfl: 1      BP Readings from Last 3 Encounters:   11/03/21 (!) 152/100   06/02/21 (!) 162/92   12/20/19 132/88       Recent Lab results:  Lab Results   Component Value Date    CHOL 240 (H) 05/03/2022   ,   Lab Results   Component Value Date    HDL 30 (L) 05/03/2022   ,   Lab Results   Component Value Date    LDLCALC SEE COMMENT 05/03/2022   ,   Lab Results   Component Value Date    TRIG 1,102 (H) 05/03/2022        Lab Results   Component Value Date    GLUCOSE 121 (H) 05/03/2022    GLUCOSE NEGATIVE 05/03/2022   ,   Lab Results   Component Value Date    HGBA1C 5.5 05/03/2022         Lab Results   Component Value Date    CREATININE 0.88 05/03/2022       Lab Results   Component Value Date    TSH 4.41 05/03/2022

## 2022-06-22 ENCOUNTER — OFFICE VISIT (OUTPATIENT)
Dept: PRIMARY CARE | Facility: CLINIC | Age: 57
End: 2022-06-22
Payer: COMMERCIAL

## 2022-06-22 VITALS
TEMPERATURE: 97.9 F | BODY MASS INDEX: 23.74 KG/M2 | SYSTOLIC BLOOD PRESSURE: 128 MMHG | HEIGHT: 71 IN | OXYGEN SATURATION: 98 % | DIASTOLIC BLOOD PRESSURE: 88 MMHG | RESPIRATION RATE: 18 BRPM | WEIGHT: 169.6 LBS | HEART RATE: 75 BPM

## 2022-06-22 DIAGNOSIS — I10 ESSENTIAL HYPERTENSION, BENIGN: ICD-10-CM

## 2022-06-22 DIAGNOSIS — E78.00 HYPERCHOLESTEREMIA: ICD-10-CM

## 2022-06-22 DIAGNOSIS — F90.0 ADHD, PREDOMINANTLY INATTENTIVE TYPE: Chronic | ICD-10-CM

## 2022-06-22 DIAGNOSIS — Z23 NEED FOR VACCINATION: ICD-10-CM

## 2022-06-22 DIAGNOSIS — E78.1 HYPERTRIGLYCERIDEMIA: Primary | ICD-10-CM

## 2022-06-22 PROBLEM — M25.562 ACUTE PAIN OF LEFT KNEE: Status: RESOLVED | Noted: 2019-01-09 | Resolved: 2022-06-22

## 2022-06-22 PROBLEM — L72.3 SEBACEOUS CYST OF RIGHT AXILLA: Status: RESOLVED | Noted: 2019-06-27 | Resolved: 2022-06-22

## 2022-06-22 PROBLEM — R73.9 HIGH BLOOD SUGAR: Status: RESOLVED | Noted: 2021-02-05 | Resolved: 2022-06-22

## 2022-06-22 PROCEDURE — 3074F SYST BP LT 130 MM HG: CPT | Performed by: NURSE PRACTITIONER

## 2022-06-22 PROCEDURE — 3079F DIAST BP 80-89 MM HG: CPT | Performed by: NURSE PRACTITIONER

## 2022-06-22 PROCEDURE — 90715 TDAP VACCINE 7 YRS/> IM: CPT | Performed by: NURSE PRACTITIONER

## 2022-06-22 PROCEDURE — 90471 IMMUNIZATION ADMIN: CPT | Performed by: NURSE PRACTITIONER

## 2022-06-22 PROCEDURE — 3008F BODY MASS INDEX DOCD: CPT | Performed by: NURSE PRACTITIONER

## 2022-06-22 PROCEDURE — 99214 OFFICE O/P EST MOD 30 MIN: CPT | Mod: 25 | Performed by: NURSE PRACTITIONER

## 2022-06-22 PROCEDURE — 90750 HZV VACC RECOMBINANT IM: CPT | Performed by: NURSE PRACTITIONER

## 2022-06-22 PROCEDURE — 90472 IMMUNIZATION ADMIN EACH ADD: CPT | Performed by: NURSE PRACTITIONER

## 2022-06-22 RX ORDER — DEXTROAMPHETAMINE SACCHARATE, AMPHETAMINE ASPARTATE MONOHYDRATE, DEXTROAMPHETAMINE SULFATE AND AMPHETAMINE SULFATE 7.5; 7.5; 7.5; 7.5 MG/1; MG/1; MG/1; MG/1
30 CAPSULE, EXTENDED RELEASE ORAL EVERY MORNING
Qty: 30 CAPSULE | Refills: 0 | Status: SHIPPED | OUTPATIENT
Start: 2022-06-22 | End: 2022-07-06 | Stop reason: SDUPTHER

## 2022-06-22 RX ORDER — ROSUVASTATIN CALCIUM 10 MG/1
10 TABLET, FILM COATED ORAL
Qty: 90 TABLET | Refills: 0 | Status: SHIPPED | OUTPATIENT
Start: 2022-06-22 | End: 2022-12-13 | Stop reason: SDUPTHER

## 2022-06-22 ASSESSMENT — ENCOUNTER SYMPTOMS
SHORTNESS OF BREATH: 0
PALPITATIONS: 0
ANOREXIA: 0
HEADACHES: 0
MYALGIAS: 0
HYPERTENSION: 1
FATIGUE: 0

## 2022-06-22 ASSESSMENT — PATIENT HEALTH QUESTIONNAIRE - PHQ9: SUM OF ALL RESPONSES TO PHQ9 QUESTIONS 1 & 2: 0

## 2022-06-22 NOTE — PROGRESS NOTES
[facility] at [address]  [place] [address]  [phone]  Fax [phone]      Patient ID: Riki Bundy                              : 1965    Visit Date: 2022    Chief Complaint: Med Management and Follow-up (Discuss labs )         Patient ID: Riki Bundy is a 57 y.o. male.    Patient Active Problem List   Diagnosis   • Hypercholesteremia   • Essential hypertension, benign   • Acute allergic rhinitis   • ADHD, predominantly inattentive type   • Displacement of lumbar intervertebral disc without myelopathy   • Primary osteoarthritis   • Adenomatous colon polyp   • FH: CAD (coronary artery disease)   • Hypertriglyceridemia         Current Outpatient Medications:   •  amLODIPine 5 mg tablet, Take 1 tablet (5 mg total) by mouth daily., Disp: 90 tablet, Rfl: 1  •  amphetamine-dextroamphetamine XR (ADDERALL XR) 30 mg 24 hr capsule, Take 1 capsule (30 mg total) by mouth every morning., Disp: 30 capsule, Rfl: 0  •  candesartan-hydrochlorothiazid 32-12.5 mg per tablet, Take 1 tablet by mouth once daily., Disp: 90 tablet, Rfl: 1  •  CRESTOR 10 mg tablet, Take 1 tablet (10 mg total) by mouth once daily., Disp: 90 tablet, Rfl: 0  •  levocetirizine (XYZAL) 5 mg tablet, Take 1 tablet (5 mg total) by mouth every evening. (Patient taking differently: Take 5 mg by mouth as needed.), Disp: 90 tablet, Rfl: 1    Allergies   Allergen Reactions   • Sulfa (Sulfonamide Antibiotics) Other (see comments)     Get fever to the point of fainting   • Cephalosporins Rash       Social History     Tobacco Use   • Smoking status: Never Smoker   • Smokeless tobacco: Never Used   Substance Use Topics   • Alcohol use: Yes     Alcohol/week: 28.0 standard drinks     Types: 28 Cans of beer per week   • Drug use: Never       Health Maintenance   Topic Date Due   • COVID-19 Vaccine (3 - Booster for Moderna series) 2021   • Zoster Vaccine (2 of 2) 2022    • Colonoscopy  03/11/2031   • DTaP, Tdap, and Td Vaccines (3 - Td or Tdap) 06/22/2032   • Influenza Vaccine  Completed   • Hepatitis C Screening  Completed   • Meningococcal ACWY  Aged Out   • HIB Vaccines  Aged Out   • IPV Vaccines  Aged Out   • HPV Vaccines  Aged Out   • Pneumococcal  Aged Out   • HIV Screening  Discontinued       HPI  Med check  Go over labs/discuss trigs    Hyperlipidemia  This is a chronic problem. The current episode started more than 1 year ago. The problem is controlled. Recent lipid tests were reviewed and are high. He has no history of diabetes, hypothyroidism, liver disease, obesity or nephrotic syndrome. There are no known factors aggravating his hyperlipidemia. Pertinent negatives include no chest pain, myalgias or shortness of breath. Current antihyperlipidemic treatment includes statins. The current treatment provides moderate improvement of lipids. There are no compliance problems.    Hypertension  This is a chronic problem. The current episode started more than 1 year ago. The problem is controlled. Pertinent negatives include no chest pain, headaches, palpitations, peripheral edema or shortness of breath. Agents associated with hypertension include amphetamines. Past treatments include calcium channel blockers, angiotensin blockers and diuretics. The current treatment provides significant improvement. There are no compliance problems.    ADHD  This is a chronic problem. The current episode started more than 1 year ago. The problem occurs daily. Progression since onset: stable. Pertinent negatives include no anorexia, chest pain, fatigue, headaches or myalgias. Associated symptoms comments: No palpitations  No insomnia. Nothing aggravates the symptoms. Treatments tried: Adderall XR. The treatment provided significant relief.       The following have been reviewed and updated as appropriate in this visit:   Allergies  Meds  Problems           Review of System  Review of Systems  "  Constitutional: Negative for fatigue.   Respiratory: Negative for shortness of breath.    Cardiovascular: Negative for chest pain and palpitations.   Gastrointestinal: Negative for anorexia.   Musculoskeletal: Negative for myalgias.   Neurological: Negative for headaches.       Objective     Vitals  Vitals:    06/22/22 1527   BP: 128/88   BP Location: Left upper arm   Patient Position: Sitting   Pulse: 75   Resp: 18   Temp: 36.6 °C (97.9 °F)   TempSrc: Temporal   SpO2: 98%   Weight: 76.9 kg (169 lb 9.6 oz)   Height: 1.803 m (5' 10.98\")     Body mass index is 23.67 kg/m².    Physical Exam  Physical Exam  Vitals reviewed.   Constitutional:       General: He is not in acute distress.     Appearance: Normal appearance. He is not diaphoretic.   Cardiovascular:      Rate and Rhythm: Normal rate and regular rhythm.      Heart sounds: No murmur heard.    No friction rub. No gallop.   Pulmonary:      Effort: Pulmonary effort is normal.      Breath sounds: Normal breath sounds. No wheezing, rhonchi or rales.   Musculoskeletal:      Right lower leg: No edema.      Left lower leg: No edema.   Neurological:      Mental Status: He is alert and oriented to person, place, and time.         Assessment/Plan     Problem List Items Addressed This Visit     ADHD, predominantly inattentive type (Chronic)     Stable on Adderall XR.  Continue med  Follow up 3 mo.           Relevant Medications    amphetamine-dextroamphetamine XR (ADDERALL XR) 30 mg 24 hr capsule    Hypercholesteremia     Stable on statin  Calcium score ordered  Repeat lipids in August.  Pt has lost 20 lbs and stopped drinking beer.           Relevant Medications    CRESTOR 10 mg tablet    Other Relevant Orders    CT HEART CORONARY ARTERY CALCIUM SCORE WITHOUT IV CONTRAST    Essential hypertension, benign     BP stable.  Pt stopped B blocker  Feels better off of it  Continue Amlodipine and Candesartan HCTZ.  Follow up 3 mo.           Hypertriglyceridemia - Primary      " Latest Reference Range & Units 05/03/22 12:28   Cholesterol <200 mg/dL 240 (H)   Triglycerides <150 mg/dL 1,102 (H)   HDL > OR = 40 mg/dL 30 (L)   LDL Calculated mg/dL (calc) SEE COMMENT   Non-HDL, Calculated <130 mg/dL (calc) 210 (H)   Chol/Hdlc Ratio <5.0 (calc) 8.0 (H)   (H): Data is abnormally high  (L): Data is abnormally low    Pt stopped beer and lost 20 lbs  Repeat lipids in August.           Relevant Medications    CRESTOR 10 mg tablet    Other Relevant Orders    Lipid panel    CT HEART CORONARY ARTERY CALCIUM SCORE WITHOUT IV CONTRAST      Other Visit Diagnoses     Need for vaccination        Relevant Orders    Tdap vaccine greater than or equal to 8yo IM (Completed)    Shingrix (Completed)              LAURA Baum  6/22/2022

## 2022-06-22 NOTE — ASSESSMENT & PLAN NOTE
Stable on statin  Calcium score ordered  Repeat lipids in August.  Pt has lost 20 lbs and stopped drinking beer.

## 2022-06-22 NOTE — ASSESSMENT & PLAN NOTE
BP stable.  Pt stopped B blocker  Feels better off of it  Continue Amlodipine and Candesartan HCTZ.  Follow up 3 mo.

## 2022-06-22 NOTE — ASSESSMENT & PLAN NOTE
Latest Reference Range & Units 05/03/22 12:28   Cholesterol <200 mg/dL 240 (H)   Triglycerides <150 mg/dL 1,102 (H)   HDL > OR = 40 mg/dL 30 (L)   LDL Calculated mg/dL (calc) SEE COMMENT   Non-HDL, Calculated <130 mg/dL (calc) 210 (H)   Chol/Hdlc Ratio <5.0 (calc) 8.0 (H)   (H): Data is abnormally high  (L): Data is abnormally low    Pt stopped beer and lost 20 lbs  Repeat lipids in August.

## 2022-06-24 ENCOUNTER — TELEPHONE (OUTPATIENT)
Dept: PRIMARY CARE | Facility: CLINIC | Age: 57
End: 2022-06-24
Payer: COMMERCIAL

## 2022-06-24 NOTE — TELEPHONE ENCOUNTER
Called and lvm to pt to verify if he is thinking to get done his CT Heart Coronary Artery Calcium and need to find out location. Let DF know if pt calls back.

## 2022-07-06 ENCOUNTER — TELEPHONE (OUTPATIENT)
Dept: PRIMARY CARE | Facility: CLINIC | Age: 57
End: 2022-07-06
Payer: COMMERCIAL

## 2022-07-06 DIAGNOSIS — F90.0 ADHD, PREDOMINANTLY INATTENTIVE TYPE: Chronic | ICD-10-CM

## 2022-07-06 RX ORDER — DEXTROAMPHETAMINE SACCHARATE, AMPHETAMINE ASPARTATE MONOHYDRATE, DEXTROAMPHETAMINE SULFATE AND AMPHETAMINE SULFATE 7.5; 7.5; 7.5; 7.5 MG/1; MG/1; MG/1; MG/1
30 CAPSULE, EXTENDED RELEASE ORAL EVERY MORNING
Qty: 30 CAPSULE | Refills: 0 | Status: SHIPPED | OUTPATIENT
Start: 2022-07-06 | End: 2022-08-02 | Stop reason: SDUPTHER

## 2022-07-06 NOTE — TELEPHONE ENCOUNTER
Patient called in he is having a problem with his adderall script that was sent over in     He never picked it up and now its  per the pharmacy    Can we send over another script    The problem is he didn't get there within the 5 days, he said its a [place] issue    adderall xr, PAOLA Howe    Please advise

## 2022-08-02 DIAGNOSIS — F90.0 ADHD, PREDOMINANTLY INATTENTIVE TYPE: Chronic | ICD-10-CM

## 2022-08-03 RX ORDER — DEXTROAMPHETAMINE SACCHARATE, AMPHETAMINE ASPARTATE MONOHYDRATE, DEXTROAMPHETAMINE SULFATE AND AMPHETAMINE SULFATE 7.5; 7.5; 7.5; 7.5 MG/1; MG/1; MG/1; MG/1
30 CAPSULE, EXTENDED RELEASE ORAL EVERY MORNING
Qty: 30 CAPSULE | Refills: 0 | Status: SHIPPED | OUTPATIENT
Start: 2022-08-03 | End: 2022-09-02 | Stop reason: SDUPTHER

## 2022-08-03 NOTE — TELEPHONE ENCOUNTER
Medicine Refill Request    Last Office: 6/22/2022   Last Consult Visit: Visit date not found  Last Telemedicine Visit: 1/26/2022 Gilda Wayne MD    Next Appointment: 9/22/2022      Current Outpatient Medications:   •  amLODIPine 5 mg tablet, Take 1 tablet (5 mg total) by mouth daily., Disp: 90 tablet, Rfl: 1  •  amphetamine-dextroamphetamine XR (ADDERALL XR) 30 mg 24 hr capsule, Take 1 capsule (30 mg total) by mouth every morning., Disp: 30 capsule, Rfl: 0  •  candesartan-hydrochlorothiazid 32-12.5 mg per tablet, Take 1 tablet by mouth once daily., Disp: 90 tablet, Rfl: 1  •  CRESTOR 10 mg tablet, Take 1 tablet (10 mg total) by mouth once daily., Disp: 90 tablet, Rfl: 0  •  levocetirizine (XYZAL) 5 mg tablet, Take 1 tablet (5 mg total) by mouth every evening. (Patient taking differently: Take 5 mg by mouth as needed.), Disp: 90 tablet, Rfl: 1      BP Readings from Last 3 Encounters:   06/22/22 128/88   11/03/21 (!) 152/100   06/02/21 (!) 162/92       Recent Lab results:  Lab Results   Component Value Date    CHOL 240 (H) 05/03/2022   ,   Lab Results   Component Value Date    HDL 30 (L) 05/03/2022   ,   Lab Results   Component Value Date    LDLCALC SEE COMMENT 05/03/2022   ,   Lab Results   Component Value Date    TRIG 1,102 (H) 05/03/2022        Lab Results   Component Value Date    GLUCOSE 121 (H) 05/03/2022    GLUCOSE NEGATIVE 05/03/2022   ,   Lab Results   Component Value Date    HGBA1C 5.5 05/03/2022         Lab Results   Component Value Date    CREATININE 0.88 05/03/2022       Lab Results   Component Value Date    TSH 4.41 05/03/2022

## 2022-09-02 DIAGNOSIS — F90.0 ADHD, PREDOMINANTLY INATTENTIVE TYPE: Chronic | ICD-10-CM

## 2022-09-02 RX ORDER — DEXTROAMPHETAMINE SACCHARATE, AMPHETAMINE ASPARTATE MONOHYDRATE, DEXTROAMPHETAMINE SULFATE AND AMPHETAMINE SULFATE 7.5; 7.5; 7.5; 7.5 MG/1; MG/1; MG/1; MG/1
30 CAPSULE, EXTENDED RELEASE ORAL EVERY MORNING
Qty: 30 CAPSULE | Refills: 0 | Status: SHIPPED | OUTPATIENT
Start: 2022-09-02 | End: 2022-09-13 | Stop reason: SDUPTHER

## 2022-09-02 NOTE — TELEPHONE ENCOUNTER
Last OV 6/22/22. Refill pended for you to sign   Would you want pt to make follow up appt with you ?

## 2022-09-13 ENCOUNTER — OFFICE VISIT (OUTPATIENT)
Dept: PRIMARY CARE | Facility: CLINIC | Age: 57
End: 2022-09-13
Payer: COMMERCIAL

## 2022-09-13 VITALS
BODY MASS INDEX: 22.93 KG/M2 | DIASTOLIC BLOOD PRESSURE: 82 MMHG | HEART RATE: 83 BPM | RESPIRATION RATE: 18 BRPM | WEIGHT: 163.8 LBS | TEMPERATURE: 98.1 F | SYSTOLIC BLOOD PRESSURE: 138 MMHG | OXYGEN SATURATION: 99 % | HEIGHT: 71 IN

## 2022-09-13 DIAGNOSIS — E78.2 MIXED HYPERLIPIDEMIA: ICD-10-CM

## 2022-09-13 DIAGNOSIS — M54.2 CERVICALGIA: ICD-10-CM

## 2022-09-13 DIAGNOSIS — I10 ESSENTIAL HYPERTENSION, BENIGN: ICD-10-CM

## 2022-09-13 DIAGNOSIS — Z23 NEED FOR VACCINATION: ICD-10-CM

## 2022-09-13 DIAGNOSIS — F90.0 ADHD, PREDOMINANTLY INATTENTIVE TYPE: Primary | Chronic | ICD-10-CM

## 2022-09-13 DIAGNOSIS — M15.0 PRIMARY OSTEOARTHRITIS INVOLVING MULTIPLE JOINTS: ICD-10-CM

## 2022-09-13 PROCEDURE — 90471 IMMUNIZATION ADMIN: CPT | Performed by: NURSE PRACTITIONER

## 2022-09-13 PROCEDURE — 90750 HZV VACC RECOMBINANT IM: CPT | Performed by: NURSE PRACTITIONER

## 2022-09-13 PROCEDURE — 3075F SYST BP GE 130 - 139MM HG: CPT | Performed by: NURSE PRACTITIONER

## 2022-09-13 PROCEDURE — 99213 OFFICE O/P EST LOW 20 MIN: CPT | Mod: 25 | Performed by: NURSE PRACTITIONER

## 2022-09-13 PROCEDURE — 3008F BODY MASS INDEX DOCD: CPT | Performed by: NURSE PRACTITIONER

## 2022-09-13 PROCEDURE — 3079F DIAST BP 80-89 MM HG: CPT | Performed by: NURSE PRACTITIONER

## 2022-09-13 RX ORDER — DEXTROAMPHETAMINE SACCHARATE, AMPHETAMINE ASPARTATE MONOHYDRATE, DEXTROAMPHETAMINE SULFATE AND AMPHETAMINE SULFATE 7.5; 7.5; 7.5; 7.5 MG/1; MG/1; MG/1; MG/1
30 CAPSULE, EXTENDED RELEASE ORAL EVERY MORNING
Qty: 30 CAPSULE | Refills: 0 | Status: SHIPPED | OUTPATIENT
Start: 2022-09-13 | End: 2022-10-21 | Stop reason: SDUPTHER

## 2022-09-13 RX ORDER — NAPROXEN 500 MG/1
500 TABLET ORAL 2 TIMES DAILY PRN
Qty: 60 TABLET | Refills: 2 | Status: SHIPPED | OUTPATIENT
Start: 2022-09-13 | End: 2024-06-19 | Stop reason: SDUPTHER

## 2022-09-13 ASSESSMENT — ENCOUNTER SYMPTOMS
MYALGIAS: 0
HYPERTENSION: 1
ANOREXIA: 0
FATIGUE: 0
SHORTNESS OF BREATH: 0
PALPITATIONS: 0

## 2022-09-13 ASSESSMENT — PATIENT HEALTH QUESTIONNAIRE - PHQ9: SUM OF ALL RESPONSES TO PHQ9 QUESTIONS 1 & 2: 0

## 2022-09-13 NOTE — PROGRESS NOTES
[facility] at [address]  [place] [address]  [phone]  Fax [phone]      Patient ID: Riki Bundy                              : 1965    Visit Date: 2022    Chief Complaint: Med Management         Patient ID: Riki Bundy is a 57 y.o. male.    Patient Active Problem List   Diagnosis    Essential hypertension, benign    Acute allergic rhinitis    ADHD, predominantly inattentive type    Displacement of lumbar intervertebral disc without myelopathy    Cervicalgia    Primary osteoarthritis    Adenomatous colon polyp    FH: CAD (coronary artery disease)    Mixed hyperlipidemia         Current Outpatient Medications:     amphetamine-dextroamphetamine XR (ADDERALL XR) 30 mg 24 hr capsule, Take 1 capsule (30 mg total) by mouth every morning., Disp: 30 capsule, Rfl: 0    naproxen (NAPROSYN) 500 mg tablet, Take 1 tablet (500 mg total) by mouth 2 (two) times a day as needed for moderate pain., Disp: 60 tablet, Rfl: 2    amLODIPine 5 mg tablet, Take 1 tablet (5 mg total) by mouth daily., Disp: 90 tablet, Rfl: 1    candesartan-hydrochlorothiazid 32-12.5 mg per tablet, Take 1 tablet by mouth once daily., Disp: 90 tablet, Rfl: 1    CRESTOR 10 mg tablet, Take 1 tablet (10 mg total) by mouth once daily., Disp: 90 tablet, Rfl: 0    levocetirizine (XYZAL) 5 mg tablet, Take 1 tablet (5 mg total) by mouth every evening. (Patient taking differently: Take 5 mg by mouth as needed.), Disp: 90 tablet, Rfl: 1    Allergies   Allergen Reactions    Sulfa (Sulfonamide Antibiotics) Other (see comments)     Get fever to the point of fainting    Cephalosporins Rash       Social History     Tobacco Use    Smoking status: Never Smoker    Smokeless tobacco: Never Used   Substance Use Topics    Alcohol use: Yes     Alcohol/week: 28.0 standard drinks     Types: 28 Cans of beer per week    Drug use: Never       Health Maintenance   Topic Date Due     COVID-19 Vaccine (3 - Booster for Moderna series) 09/30/2021    Influenza Vaccine (1) 08/01/2022    Zoster Vaccine (2 of 2) 08/17/2022    Colorectal Cancer Screening  03/11/2031    DTaP, Tdap, and Td Vaccines (3 - Td or Tdap) 06/22/2032    Hepatitis C Screening  Completed    Meningococcal ACWY  Aged Out    HIB Vaccines  Aged Out    IPV Vaccines  Aged Out    HPV Vaccines  Aged Out    Pneumococcal  Aged Out    HIV Screening  Discontinued       HPI  Routine follow up  Med check  Shingrix #2    Hypertension  This is a chronic problem. The current episode started more than 1 year ago. The problem is controlled. Pertinent negatives include no chest pain, palpitations, peripheral edema or shortness of breath. There are no associated agents to hypertension. Past treatments include angiotensin blockers and diuretics. The current treatment provides significant improvement. There are no compliance problems.    Hyperlipidemia  This is a chronic problem. The current episode started more than 1 year ago. The problem is controlled. Recent lipid tests were reviewed and are normal. Pertinent negatives include no chest pain, myalgias or shortness of breath. Current antihyperlipidemic treatment includes statins. The current treatment provides significant improvement of lipids. There are no compliance problems.    ADHD  This is a chronic problem. The current episode started more than 1 year ago. Progression since onset: stable. Pertinent negatives include no anorexia, chest pain, fatigue or myalgias. Nothing aggravates the symptoms. Treatments tried: Adderall XR. The treatment provided significant relief.       The following have been reviewed and updated as appropriate in this visit:   Allergies  Meds  Problems           Review of System  Review of Systems   Constitutional: Negative for fatigue.   Respiratory: Negative for shortness of breath.    Cardiovascular: Negative for chest pain and palpitations.  "  Gastrointestinal: Negative for anorexia.   Musculoskeletal: Negative for myalgias.       Objective     Vitals  Vitals:    09/13/22 0840   BP: 138/82   BP Location: Left upper arm   Patient Position: Sitting   Pulse: 83   Resp: 18   Temp: 36.7 °C (98.1 °F)   TempSrc: Temporal   SpO2: 99%   Weight: 74.3 kg (163 lb 12.8 oz)   Height: 1.803 m (5' 10.98\")     Body mass index is 22.86 kg/m².    Physical Exam  Physical Exam  Vitals reviewed.   Constitutional:       General: He is not in acute distress.     Appearance: Normal appearance. He is not diaphoretic.   Cardiovascular:      Rate and Rhythm: Normal rate and regular rhythm.      Heart sounds: No murmur heard.    No friction rub. No gallop.   Pulmonary:      Effort: Pulmonary effort is normal.      Breath sounds: Normal breath sounds. No wheezing, rhonchi or rales.   Musculoskeletal:      Cervical back: No swelling, deformity or tenderness. Pain with movement present. Normal range of motion.      Right lower leg: No edema.      Left lower leg: No edema.   Neurological:      Mental Status: He is alert and oriented to person, place, and time.   Psychiatric:         Mood and Affect: Mood and affect normal.         Speech: Speech normal.         Behavior: Behavior normal.         Assessment/Plan     Problem List Items Addressed This Visit     ADHD, predominantly inattentive type - Primary (Chronic)     Stable on med  Continue Adderall XR  Follow up 3 months           Relevant Medications    amphetamine-dextroamphetamine XR (ADDERALL XR) 30 mg 24 hr capsule    Essential hypertension, benign     BP stable.  Continue med  Follow up 3 months           Cervicalgia     X-ray C spine           Relevant Orders    X-RAY CERVICAL SPINE COMPLETE 4 OR 5 VIEWS    Primary osteoarthritis     Naproxen PRN           Relevant Medications    naproxen (NAPROSYN) 500 mg tablet    Mixed hyperlipidemia     Repeat lipid panel.             Other Visit Diagnoses     Need for vaccination        " Relevant Orders    Shingrix        Needs flu shot and COVID booster this fall.      LAURA Baum  9/13/2022

## 2022-12-13 ENCOUNTER — OFFICE VISIT (OUTPATIENT)
Dept: PRIMARY CARE | Facility: CLINIC | Age: 57
End: 2022-12-13
Payer: COMMERCIAL

## 2022-12-13 VITALS
RESPIRATION RATE: 18 BRPM | SYSTOLIC BLOOD PRESSURE: 122 MMHG | HEART RATE: 87 BPM | HEIGHT: 71 IN | TEMPERATURE: 98.3 F | OXYGEN SATURATION: 99 % | BODY MASS INDEX: 23.35 KG/M2 | DIASTOLIC BLOOD PRESSURE: 72 MMHG | WEIGHT: 166.8 LBS

## 2022-12-13 DIAGNOSIS — E78.00 HYPERCHOLESTEREMIA: ICD-10-CM

## 2022-12-13 DIAGNOSIS — Z23 NEED FOR VACCINATION: ICD-10-CM

## 2022-12-13 DIAGNOSIS — F90.0 ADHD, PREDOMINANTLY INATTENTIVE TYPE: Chronic | ICD-10-CM

## 2022-12-13 DIAGNOSIS — I10 ESSENTIAL HYPERTENSION, BENIGN: Primary | ICD-10-CM

## 2022-12-13 DIAGNOSIS — E78.2 MIXED HYPERLIPIDEMIA: ICD-10-CM

## 2022-12-13 PROCEDURE — 3074F SYST BP LT 130 MM HG: CPT | Performed by: NURSE PRACTITIONER

## 2022-12-13 PROCEDURE — 90471 IMMUNIZATION ADMIN: CPT | Performed by: NURSE PRACTITIONER

## 2022-12-13 PROCEDURE — 3078F DIAST BP <80 MM HG: CPT | Performed by: NURSE PRACTITIONER

## 2022-12-13 PROCEDURE — 3008F BODY MASS INDEX DOCD: CPT | Performed by: NURSE PRACTITIONER

## 2022-12-13 PROCEDURE — 90686 IIV4 VACC NO PRSV 0.5 ML IM: CPT | Performed by: NURSE PRACTITIONER

## 2022-12-13 PROCEDURE — 99213 OFFICE O/P EST LOW 20 MIN: CPT | Mod: 25 | Performed by: NURSE PRACTITIONER

## 2022-12-13 RX ORDER — AMLODIPINE BESYLATE 5 MG/1
5 TABLET ORAL DAILY
Qty: 90 TABLET | Refills: 1 | Status: SHIPPED | OUTPATIENT
Start: 2022-12-13 | End: 2023-03-14 | Stop reason: SDUPTHER

## 2022-12-13 RX ORDER — CANDESARTAN CILEXETIL AND HYDROCHLOROTHIAZIDE 32; 12.5 MG/1; MG/1
1 TABLET ORAL
Qty: 90 TABLET | Refills: 1 | Status: SHIPPED | OUTPATIENT
Start: 2022-12-13 | End: 2023-03-14 | Stop reason: SDUPTHER

## 2022-12-13 RX ORDER — DEXTROAMPHETAMINE SACCHARATE, AMPHETAMINE ASPARTATE MONOHYDRATE, DEXTROAMPHETAMINE SULFATE AND AMPHETAMINE SULFATE 7.5; 7.5; 7.5; 7.5 MG/1; MG/1; MG/1; MG/1
30 CAPSULE, EXTENDED RELEASE ORAL EVERY MORNING
Qty: 30 CAPSULE | Refills: 0 | Status: SHIPPED | OUTPATIENT
Start: 2022-12-13 | End: 2022-12-26 | Stop reason: SDUPTHER

## 2022-12-13 RX ORDER — ROSUVASTATIN CALCIUM 10 MG/1
10 TABLET, FILM COATED ORAL
Qty: 90 TABLET | Refills: 1 | Status: SHIPPED | OUTPATIENT
Start: 2022-12-13 | End: 2023-03-14 | Stop reason: SDUPTHER

## 2022-12-13 ASSESSMENT — ENCOUNTER SYMPTOMS
HYPERTENSION: 1
PALPITATIONS: 0
FATIGUE: 0
SHORTNESS OF BREATH: 0
ANOREXIA: 0

## 2022-12-13 ASSESSMENT — PATIENT HEALTH QUESTIONNAIRE - PHQ9: SUM OF ALL RESPONSES TO PHQ9 QUESTIONS 1 & 2: 0

## 2022-12-13 ASSESSMENT — PAIN SCALES - GENERAL: PAINLEVEL: 0-NO PAIN

## 2022-12-13 NOTE — PROGRESS NOTES
[facility] at [address]  [place] [address]  [phone]  Fax [phone]      Patient ID: Riki Bundy                              : 1965    Visit Date: 2022    Chief Complaint: Med Management         Patient ID: Riki Bundy is a 57 y.o. male.    Patient Active Problem List   Diagnosis   • Essential hypertension, benign   • Acute allergic rhinitis   • ADHD, predominantly inattentive type   • Displacement of lumbar intervertebral disc without myelopathy   • Cervicalgia   • Primary osteoarthritis   • Adenomatous colon polyp   • FH: CAD (coronary artery disease)   • Mixed hyperlipidemia         Current Outpatient Medications:   •  amLODIPine (NORVASC) 5 mg tablet, Take 1 tablet (5 mg total) by mouth daily., Disp: 90 tablet, Rfl: 1  •  amphetamine-dextroamphetamine XR (ADDERALL XR) 30 mg 24 hr capsule, Take 1 capsule (30 mg total) by mouth every morning., Disp: 30 capsule, Rfl: 0  •  candesartan-hydrochlorothiazid (ATACAND HCT) 32-12.5 mg per tablet, Take 1 tablet by mouth once daily., Disp: 90 tablet, Rfl: 1  •  CRESTOR 10 mg tablet, Take 1 tablet (10 mg total) by mouth once daily., Disp: 90 tablet, Rfl: 1  •  levocetirizine (XYZAL) 5 mg tablet, Take 1 tablet (5 mg total) by mouth every evening. (Patient taking differently: Take 5 mg by mouth as needed.), Disp: 90 tablet, Rfl: 1  •  naproxen (NAPROSYN) 500 mg tablet, Take 1 tablet (500 mg total) by mouth 2 (two) times a day as needed for moderate pain., Disp: 60 tablet, Rfl: 2    Allergies   Allergen Reactions   • Sulfa (Sulfonamide Antibiotics) Other (see comments)     Get fever to the point of fainting   • Cephalosporins Rash       Social History     Tobacco Use   • Smoking status: Never   • Smokeless tobacco: Never   Substance Use Topics   • Alcohol use: Yes     Alcohol/week: 28.0 standard drinks     Types: 28 Cans of beer per week   • Drug use: Never       Health Maintenance    Topic Date Due   • COVID-19 Vaccine (3 - Booster for Moderna series) 06/25/2021   • Depression Screening  12/13/2023   • Colorectal Cancer Screening  03/11/2031   • DTaP, Tdap, and Td Vaccines (3 - Td or Tdap) 06/22/2032   • Zoster Vaccine  Completed   • Influenza Vaccine  Completed   • Hepatitis C Screening  Completed   • Meningococcal ACWY  Aged Out   • HIB Vaccines  Aged Out   • IPV Vaccines  Aged Out   • HPV Vaccines  Aged Out   • Pneumococcal  Aged Out   • Hepatitis B Vaccines  Discontinued   • HIV Screening  Discontinued       HPI  Routine med check    ADHD  This is a chronic problem. The current episode started more than 1 year ago. The problem occurs daily. Progression since onset: stable. Pertinent negatives include no anorexia, chest pain or fatigue. Associated symptoms comments: No palpitations  No insomnia. Nothing aggravates the symptoms. Treatments tried: Adderall XR. The treatment provided significant relief.   Hypertension  This is a chronic problem. The current episode started more than 1 year ago. The problem is unchanged. The problem is controlled. Pertinent negatives include no chest pain, palpitations, peripheral edema or shortness of breath. There are no associated agents to hypertension. Past treatments include angiotensin blockers, diuretics and calcium channel blockers. The current treatment provides significant improvement.   Hyperlipidemia  This is a chronic problem. The current episode started more than 1 year ago. The problem is uncontrolled. Recent lipid tests were reviewed and are high. There are no known factors aggravating his hyperlipidemia. Pertinent negatives include no chest pain or shortness of breath. Current antihyperlipidemic treatment includes statins. The current treatment provides moderate improvement of lipids. There are no compliance problems.        The following have been reviewed and updated as appropriate in this visit:   Allergies  Meds  Problems         Review  "of System  Review of Systems   Constitutional: Negative for fatigue.   Respiratory: Negative for shortness of breath.    Cardiovascular: Negative for chest pain and palpitations.   Gastrointestinal: Negative for anorexia.       Objective     Vitals  Vitals:    12/13/22 0808   BP: 122/72   BP Location: Left upper arm   Patient Position: Sitting   Pulse: 87   Resp: 18   Temp: 36.8 °C (98.3 °F)   TempSrc: Temporal   SpO2: 99%   Weight: 75.7 kg (166 lb 12.8 oz)   Height: 1.803 m (5' 10.98\")     Body mass index is 23.27 kg/m².      Physical Exam  Vitals reviewed.   Constitutional:       General: He is not in acute distress.     Appearance: Normal appearance. He is not diaphoretic.   Cardiovascular:      Rate and Rhythm: Normal rate and regular rhythm.      Heart sounds: No murmur heard.    No friction rub. No gallop.   Pulmonary:      Effort: Pulmonary effort is normal.      Breath sounds: Normal breath sounds. No wheezing, rhonchi or rales.   Musculoskeletal:      Right lower leg: No edema.      Left lower leg: No edema.   Neurological:      Mental Status: He is alert and oriented to person, place, and time.   Psychiatric:         Mood and Affect: Mood and affect normal.         Speech: Speech normal.         Behavior: Behavior normal.         Assessment/Plan     Problem List Items Addressed This Visit     ADHD, predominantly inattentive type (Chronic)     Stable on Adderall XR  Continue med  Follow up 3 mo.         Relevant Medications    amphetamine-dextroamphetamine XR (ADDERALL XR) 30 mg 24 hr capsule    Essential hypertension, benign - Primary     BP stable.  Continue meds.  Follow up 3 mo.         Relevant Medications    candesartan-hydrochlorothiazid (ATACAND HCT) 32-12.5 mg per tablet    amLODIPine (NORVASC) 5 mg tablet    Mixed hyperlipidemia     Check lipids  Continue Crestor.         Relevant Medications    CRESTOR 10 mg tablet    Other Relevant Orders    Lipid panel   Other Visit Diagnoses     Need for " vaccination        Relevant Orders    Influenza vaccine quadrivalent preservative free 6 mon and older IM (FluLaval) (Completed)    Hypercholesteremia        Relevant Medications    CRESTOR 10 mg tablet              LAURA Baum  12/13/2022

## 2022-12-30 ENCOUNTER — TELEPHONE (OUTPATIENT)
Dept: PRIMARY CARE | Facility: CLINIC | Age: 57
End: 2022-12-30
Payer: COMMERCIAL

## 2022-12-30 DIAGNOSIS — F90.0 ADHD, PREDOMINANTLY INATTENTIVE TYPE: Primary | Chronic | ICD-10-CM

## 2022-12-30 RX ORDER — DEXTROAMPHETAMINE SACCHARATE, AMPHETAMINE ASPARTATE MONOHYDRATE, DEXTROAMPHETAMINE SULFATE AND AMPHETAMINE SULFATE 3.75; 3.75; 3.75; 3.75 MG/1; MG/1; MG/1; MG/1
CAPSULE, EXTENDED RELEASE ORAL
Qty: 60 CAPSULE | Refills: 0 | Status: SHIPPED | OUTPATIENT
Start: 2022-12-30 | End: 2023-01-26

## 2022-12-30 NOTE — TELEPHONE ENCOUNTER
Call from pt. Unable to get his Adderall rx due to back order. Asking if he could get 15 mg to take 2 a day instead of 30 mg sent to [facility] in [facility] [phone]. They have a limited supply of 15 mg.

## 2023-01-25 ENCOUNTER — TELEPHONE (OUTPATIENT)
Dept: PRIMARY CARE | Facility: CLINIC | Age: 58
End: 2023-01-25
Payer: COMMERCIAL

## 2023-01-25 DIAGNOSIS — F90.0 ADHD, PREDOMINANTLY INATTENTIVE TYPE: Primary | Chronic | ICD-10-CM

## 2023-01-25 NOTE — TELEPHONE ENCOUNTER
Brand necessary adderall xr 30mg, insurance is requesting a prior authorization. [facility] is who to call for [place] [phone]    Or is there an alternative?    Or send prescription to pharmacy brand necessary and patient will pay out of pocket.

## 2023-01-26 RX ORDER — DEXTROAMPHETAMINE SULFATE, DEXTROAMPHETAMINE SACCHARATE, AMPHETAMINE SULFATE AND AMPHETAMINE ASPARTATE 7.5; 7.5; 7.5; 7.5 MG/1; MG/1; MG/1; MG/1
30 CAPSULE, EXTENDED RELEASE ORAL EVERY MORNING
Qty: 30 CAPSULE | Refills: 0 | Status: SHIPPED | OUTPATIENT
Start: 2023-01-26 | End: 2023-01-30 | Stop reason: SDUPTHER

## 2023-01-26 NOTE — TELEPHONE ENCOUNTER
"[facility] states blow:  \"Drug is covered by current benefit plan. No further PA activity needed\"       Spoke with patient-  He would like Lola to send a script with [facility] on the script.      If that does not go through the insurance patient is going to let us know so that I can call [facility] and we will get it approved.     Patient has enough pills into next week, but wanted to get this started before he ran out.     "

## 2023-01-26 NOTE — TELEPHONE ENCOUNTER
Prior Auth is needed for: Riki Bundy  Medication: Adderall (BMN) 30 mg xr   Sig:MARQUISE Grant CMA   Quantity: 30   Insurance Company:  [employer] ([facility])  ID: [id number]  Rx BIN: [id number]  Rx PCN: [id number]  Rx Group Number: [id number]  Phone Number:

## 2023-01-30 ENCOUNTER — TELEPHONE (OUTPATIENT)
Dept: PRIMARY CARE | Facility: CLINIC | Age: 58
End: 2023-01-30
Payer: COMMERCIAL

## 2023-01-30 DIAGNOSIS — F90.0 ADHD, PREDOMINANTLY INATTENTIVE TYPE: Chronic | ICD-10-CM

## 2023-01-30 RX ORDER — DEXTROAMPHETAMINE SACCHARATE, AMPHETAMINE ASPARTATE MONOHYDRATE, DEXTROAMPHETAMINE SULFATE AND AMPHETAMINE SULFATE 3.75; 3.75; 3.75; 3.75 MG/1; MG/1; MG/1; MG/1
15 CAPSULE, EXTENDED RELEASE ORAL 2 TIMES DAILY
Qty: 60 CAPSULE | Refills: 0 | Status: SHIPPED | OUTPATIENT
Start: 2023-01-30 | End: 2023-03-06 | Stop reason: SDUPTHER

## 2023-01-30 NOTE — TELEPHONE ENCOUNTER
ADDERALL XR 30 mg 24 hr capsule      Patient needs rx sent for adderall xr 15mg taking 2 per day sent asap    Sent to [facility] alex  They do not have the 30 mg

## 2023-03-06 DIAGNOSIS — F90.0 ADHD, PREDOMINANTLY INATTENTIVE TYPE: Primary | ICD-10-CM

## 2023-03-06 RX ORDER — DEXTROAMPHETAMINE SACCHARATE, AMPHETAMINE ASPARTATE MONOHYDRATE, DEXTROAMPHETAMINE SULFATE AND AMPHETAMINE SULFATE 7.5; 7.5; 7.5; 7.5 MG/1; MG/1; MG/1; MG/1
30 CAPSULE, EXTENDED RELEASE ORAL EVERY MORNING
Qty: 30 CAPSULE | Refills: 0 | Status: SHIPPED | OUTPATIENT
Start: 2023-03-06 | End: 2023-04-04 | Stop reason: SDUPTHER

## 2023-03-06 NOTE — TELEPHONE ENCOUNTER
Medicine Refill Request    Last Office: 12/13/2022   Last Consult Visit: Visit date not found  Last Telemedicine Visit: 1/26/2022 Gilda Wayne MD    Next Appointment: 3/14/2023     amphetamine-dextroamphetamine XR (ADDERALL XR) 30mg 24 hr capsule, Take 1 capsule (30mg total) by mouth  1 time a day., Disp: 60 capsule, Rfl: 0    Patient called the pharmacy and they have the 30mg in stock.    To [facility] to [facility]      Current Outpatient Medications:   •  amLODIPine (NORVASC) 5 mg tablet, Take 1 tablet (5 mg total) by mouth daily., Disp: 90 tablet, Rfl: 1  •  amphetamine-dextroamphetamine XR (ADDERALL XR) 15 mg 24 hr capsule, Take 1 capsule (15 mg total) by mouth 2 (two) times a day., Disp: 60 capsule, Rfl: 0  •  candesartan-hydrochlorothiazid (ATACAND HCT) 32-12.5 mg per tablet, Take 1 tablet by mouth once daily., Disp: 90 tablet, Rfl: 1  •  CRESTOR 10 mg tablet, Take 1 tablet (10 mg total) by mouth once daily., Disp: 90 tablet, Rfl: 1  •  levocetirizine (XYZAL) 5 mg tablet, Take 1 tablet (5 mg total) by mouth every evening. (Patient taking differently: Take 5 mg by mouth as needed.), Disp: 90 tablet, Rfl: 1  •  naproxen (NAPROSYN) 500 mg tablet, Take 1 tablet (500 mg total) by mouth 2 (two) times a day as needed for moderate pain., Disp: 60 tablet, Rfl: 2      BP Readings from Last 3 Encounters:   12/13/22 122/72   09/13/22 138/82   06/22/22 128/88       Recent Lab results:  Lab Results   Component Value Date    CHOL 240 (H) 05/03/2022   ,   Lab Results   Component Value Date    HDL 30 (L) 05/03/2022   ,   Lab Results   Component Value Date    LDLCALC SEE COMMENT 05/03/2022   ,   Lab Results   Component Value Date    TRIG 1,102 (H) 05/03/2022        Lab Results   Component Value Date    GLUCOSE 121 (H) 05/03/2022    GLUCOSE NEGATIVE 05/03/2022   ,   Lab Results   Component Value Date    HGBA1C 5.5 05/03/2022         Lab Results   Component Value Date    CREATININE 0.88 05/03/2022       Lab Results   Component Value Date     TSH 4.41 05/03/2022

## 2023-03-14 ENCOUNTER — OFFICE VISIT (OUTPATIENT)
Dept: PRIMARY CARE | Facility: CLINIC | Age: 58
End: 2023-03-14
Payer: COMMERCIAL

## 2023-03-14 VITALS
RESPIRATION RATE: 18 BRPM | HEIGHT: 70 IN | DIASTOLIC BLOOD PRESSURE: 80 MMHG | OXYGEN SATURATION: 98 % | TEMPERATURE: 98.2 F | WEIGHT: 165.4 LBS | HEART RATE: 95 BPM | BODY MASS INDEX: 23.68 KG/M2 | SYSTOLIC BLOOD PRESSURE: 124 MMHG

## 2023-03-14 DIAGNOSIS — I10 ESSENTIAL HYPERTENSION, BENIGN: ICD-10-CM

## 2023-03-14 DIAGNOSIS — F90.0 ADHD, PREDOMINANTLY INATTENTIVE TYPE: Primary | Chronic | ICD-10-CM

## 2023-03-14 DIAGNOSIS — E78.2 MIXED HYPERLIPIDEMIA: ICD-10-CM

## 2023-03-14 DIAGNOSIS — E78.00 HYPERCHOLESTEREMIA: ICD-10-CM

## 2023-03-14 PROCEDURE — 99213 OFFICE O/P EST LOW 20 MIN: CPT | Performed by: NURSE PRACTITIONER

## 2023-03-14 PROCEDURE — 3008F BODY MASS INDEX DOCD: CPT | Performed by: NURSE PRACTITIONER

## 2023-03-14 PROCEDURE — 3074F SYST BP LT 130 MM HG: CPT | Performed by: NURSE PRACTITIONER

## 2023-03-14 PROCEDURE — 3079F DIAST BP 80-89 MM HG: CPT | Performed by: NURSE PRACTITIONER

## 2023-03-14 RX ORDER — CANDESARTAN CILEXETIL AND HYDROCHLOROTHIAZIDE 32; 12.5 MG/1; MG/1
1 TABLET ORAL
Qty: 90 TABLET | Refills: 1 | Status: SHIPPED | OUTPATIENT
Start: 2023-03-14 | End: 2023-06-26 | Stop reason: SDUPTHER

## 2023-03-14 RX ORDER — AMLODIPINE BESYLATE 5 MG/1
5 TABLET ORAL DAILY
Qty: 90 TABLET | Refills: 1 | Status: SHIPPED | OUTPATIENT
Start: 2023-03-14 | End: 2023-06-26 | Stop reason: SDUPTHER

## 2023-03-14 RX ORDER — ROSUVASTATIN CALCIUM 10 MG/1
10 TABLET, FILM COATED ORAL
Qty: 90 TABLET | Refills: 1 | Status: SHIPPED | OUTPATIENT
Start: 2023-03-14 | End: 2023-06-26 | Stop reason: SDUPTHER

## 2023-03-14 ASSESSMENT — PATIENT HEALTH QUESTIONNAIRE - PHQ9: SUM OF ALL RESPONSES TO PHQ9 QUESTIONS 1 & 2: 0

## 2023-03-14 ASSESSMENT — ENCOUNTER SYMPTOMS
HEADACHES: 0
FATIGUE: 0
MYALGIAS: 0
SHORTNESS OF BREATH: 0
ANOREXIA: 0
PALPITATIONS: 0
HYPERTENSION: 1

## 2023-03-14 NOTE — PROGRESS NOTES
[facility] at [address]  [place] [address]  [phone]  Fax [phone]      Patient ID: Riki Bundy                              : 1965    Visit Date: 3/14/2023    Chief Complaint: Med Management         Patient ID: Riki Bundy is a 58 y.o. male.    Patient Active Problem List   Diagnosis   • Essential hypertension, benign   • Acute allergic rhinitis   • ADHD, predominantly inattentive type   • Displacement of lumbar intervertebral disc without myelopathy   • Cervicalgia   • Primary osteoarthritis   • Adenomatous colon polyp   • FH: CAD (coronary artery disease)   • Mixed hyperlipidemia         Current Outpatient Medications:   •  amLODIPine (NORVASC) 5 mg tablet, Take 1 tablet (5 mg total) by mouth daily., Disp: 90 tablet, Rfl: 1  •  candesartan-hydrochlorothiazid (ATACAND HCT) 32-12.5 mg per tablet, Take 1 tablet by mouth once daily., Disp: 90 tablet, Rfl: 1  •  CRESTOR 10 mg tablet, Take 1 tablet (10 mg total) by mouth once daily., Disp: 90 tablet, Rfl: 1  •  amphetamine-dextroamphetamine XR (ADDERALL XR) 30 mg 24 hr capsule, Take 1 capsule (30 mg total) by mouth every morning., Disp: 30 capsule, Rfl: 0  •  levocetirizine (XYZAL) 5 mg tablet, Take 1 tablet (5 mg total) by mouth every evening. (Patient taking differently: Take 5 mg by mouth as needed.), Disp: 90 tablet, Rfl: 1  •  naproxen (NAPROSYN) 500 mg tablet, Take 1 tablet (500 mg total) by mouth 2 (two) times a day as needed for moderate pain., Disp: 60 tablet, Rfl: 2    Allergies   Allergen Reactions   • Sulfa (Sulfonamide Antibiotics) Other (see comments)     Get fever to the point of fainting   • Cephalosporins Rash       Social History     Tobacco Use   • Smoking status: Never   • Smokeless tobacco: Never   Substance Use Topics   • Alcohol use: Yes     Alcohol/week: 28.0 standard drinks of alcohol     Types: 28 Cans of beer per week   • Drug use: Never       Health  Maintenance   Topic Date Due   • COVID-19 Vaccine (3 - Booster for Moderna series) 06/25/2021   • Depression Screening  03/14/2024   • Colorectal Cancer Screening  03/11/2031   • DTaP, Tdap, and Td Vaccines (3 - Td or Tdap) 06/22/2032   • Zoster Vaccine  Completed   • Influenza Vaccine  Completed   • Hepatitis C Screening  Completed   • Meningococcal ACWY  Aged Out   • HIB Vaccines  Aged Out   • IPV Vaccines  Aged Out   • HPV Vaccines  Aged Out   • Pneumococcal  Aged Out   • Hepatitis B Vaccines  Discontinued   • HIV Screening  Discontinued       HPI  Routine med check    ADHD  This is a chronic problem. The current episode started more than 1 year ago. The problem occurs daily. Progression since onset: stable. Pertinent negatives include no anorexia, chest pain, fatigue, headaches or myalgias. Associated symptoms comments: No palpitations  No insomnia. Nothing aggravates the symptoms. Treatments tried: Adderalll XR  The treatment provided significant relief.   Hypertension  This is a chronic problem. The current episode started more than 1 year ago. The problem is controlled. Pertinent negatives include no chest pain, headaches, palpitations, peripheral edema or shortness of breath. Agents associated with hypertension include amphetamines. Past treatments include calcium channel blockers, diuretics and angiotensin blockers. The current treatment provides significant improvement. There are no compliance problems.  There is no history of chronic renal disease.   Hyperlipidemia  This is a chronic problem. The current episode started more than 1 year ago. The problem is uncontrolled. Recent lipid tests were reviewed and are high. He has no history of chronic renal disease, diabetes, hypothyroidism, liver disease or nephrotic syndrome. Pertinent negatives include no chest pain, myalgias or shortness of breath. Current antihyperlipidemic treatment includes statins. The current treatment provides significant improvement  "of lipids. There are no compliance problems.        The following have been reviewed and updated as appropriate in this visit:   Allergies  Meds  Problems         Review of System  Review of Systems   Constitutional: Negative for fatigue.   Respiratory: Negative for shortness of breath.    Cardiovascular: Negative for chest pain and palpitations.   Gastrointestinal: Negative for anorexia.   Musculoskeletal: Negative for myalgias.   Neurological: Negative for headaches.       Objective     Vitals  Vitals:    03/14/23 0830   BP: 124/80   BP Location: Left upper arm   Patient Position: Sitting   Pulse: 95   Resp: 18   Temp: 36.8 °C (98.2 °F)   TempSrc: Temporal   SpO2: 98%   Weight: 75 kg (165 lb 6.4 oz)   Height: 1.778 m (5' 10\")     Body mass index is 23.73 kg/m².      Physical Exam  Vitals reviewed.   Constitutional:       General: He is not in acute distress.     Appearance: Normal appearance. He is not diaphoretic.   Cardiovascular:      Rate and Rhythm: Normal rate and regular rhythm.      Heart sounds: No murmur heard.     No friction rub. No gallop.   Pulmonary:      Effort: Pulmonary effort is normal.      Breath sounds: Normal breath sounds. No wheezing, rhonchi or rales.   Musculoskeletal:      Right lower leg: No edema.      Left lower leg: No edema.   Neurological:      Mental Status: He is alert and oriented to person, place, and time.   Psychiatric:         Mood and Affect: Mood and affect normal.         Speech: Speech normal.         Behavior: Behavior normal.         Assessment/Plan     Problem List Items Addressed This Visit     ADHD, predominantly inattentive type - Primary (Chronic)     Was able to get brand Adderall this month  Will request monthly.  To consider alternative med if needed.  Follow up 3 months         Essential hypertension, benign     BP stable.  Continue meds  Follow up 3 months         Relevant Medications    amLODIPine (NORVASC) 5 mg tablet    candesartan-hydrochlorothiazid " (ATACAND HCT) 32-12.5 mg per tablet    Mixed hyperlipidemia     Repeat lipids now  Continue Crestor.         Relevant Medications    CRESTOR 10 mg tablet   Other Visit Diagnoses     Hypercholesteremia        Relevant Medications    CRESTOR 10 mg tablet              LAURA Baum  3/14/2023

## 2023-03-14 NOTE — ASSESSMENT & PLAN NOTE
Was able to get brand Adderall this month  Will request monthly.  To consider alternative med if needed.  Follow up 3 months

## 2023-04-04 DIAGNOSIS — F90.0 ADHD, PREDOMINANTLY INATTENTIVE TYPE: ICD-10-CM

## 2023-04-04 RX ORDER — DEXTROAMPHETAMINE SACCHARATE, AMPHETAMINE ASPARTATE MONOHYDRATE, DEXTROAMPHETAMINE SULFATE AND AMPHETAMINE SULFATE 7.5; 7.5; 7.5; 7.5 MG/1; MG/1; MG/1; MG/1
30 CAPSULE, EXTENDED RELEASE ORAL EVERY MORNING
Qty: 30 CAPSULE | Refills: 0 | Status: SHIPPED | OUTPATIENT
Start: 2023-04-04 | End: 2023-05-01 | Stop reason: SDUPTHER

## 2023-04-04 NOTE — TELEPHONE ENCOUNTER
Medicine Refill Request    Last Office: 3/14/2023   Last Consult Visit: Visit date not found  Last Telemedicine Visit: 1/26/2022 Gilda Wayne MD    Next Appointment: 6/14/2023      Current Outpatient Medications:   •  amLODIPine (NORVASC) 5 mg tablet, Take 1 tablet (5 mg total) by mouth daily., Disp: 90 tablet, Rfl: 1  •  amphetamine-dextroamphetamine XR (ADDERALL XR) 30 mg 24 hr capsule, Take 1 capsule (30 mg total) by mouth every morning., Disp: 30 capsule, Rfl: 0  •  candesartan-hydrochlorothiazid (ATACAND HCT) 32-12.5 mg per tablet, Take 1 tablet by mouth once daily., Disp: 90 tablet, Rfl: 1  •  CRESTOR 10 mg tablet, Take 1 tablet (10 mg total) by mouth once daily., Disp: 90 tablet, Rfl: 1  •  levocetirizine (XYZAL) 5 mg tablet, Take 1 tablet (5 mg total) by mouth every evening. (Patient taking differently: Take 5 mg by mouth as needed.), Disp: 90 tablet, Rfl: 1  •  naproxen (NAPROSYN) 500 mg tablet, Take 1 tablet (500 mg total) by mouth 2 (two) times a day as needed for moderate pain., Disp: 60 tablet, Rfl: 2      BP Readings from Last 3 Encounters:   03/14/23 124/80   12/13/22 122/72   09/13/22 138/82       Recent Lab results:  Lab Results   Component Value Date    CHOL 240 (H) 05/03/2022   ,   Lab Results   Component Value Date    HDL 30 (L) 05/03/2022   ,   Lab Results   Component Value Date    LDLCALC SEE COMMENT 05/03/2022   ,   Lab Results   Component Value Date    TRIG 1,102 (H) 05/03/2022        Lab Results   Component Value Date    GLUCOSE 121 (H) 05/03/2022    GLUCOSE NEGATIVE 05/03/2022   ,   Lab Results   Component Value Date    HGBA1C 5.5 05/03/2022         Lab Results   Component Value Date    CREATININE 0.88 05/03/2022       Lab Results   Component Value Date    TSH 4.41 05/03/2022

## 2023-04-04 NOTE — TELEPHONE ENCOUNTER
Pt called to refill Adderall 30 mg. Please send to [facility] [address], [place] [address]

## 2023-06-14 ENCOUNTER — OFFICE VISIT (OUTPATIENT)
Dept: PRIMARY CARE | Facility: CLINIC | Age: 58
End: 2023-06-14
Payer: COMMERCIAL

## 2023-06-14 VITALS
SYSTOLIC BLOOD PRESSURE: 122 MMHG | BODY MASS INDEX: 23.77 KG/M2 | HEIGHT: 71 IN | OXYGEN SATURATION: 99 % | TEMPERATURE: 97.8 F | WEIGHT: 169.8 LBS | RESPIRATION RATE: 18 BRPM | HEART RATE: 89 BPM | DIASTOLIC BLOOD PRESSURE: 82 MMHG

## 2023-06-14 DIAGNOSIS — Z00.00 PREVENTATIVE HEALTH CARE: ICD-10-CM

## 2023-06-14 DIAGNOSIS — Z12.5 PROSTATE CANCER SCREENING: ICD-10-CM

## 2023-06-14 DIAGNOSIS — E78.2 MIXED HYPERLIPIDEMIA: ICD-10-CM

## 2023-06-14 DIAGNOSIS — F90.0 ADHD, PREDOMINANTLY INATTENTIVE TYPE: Primary | Chronic | ICD-10-CM

## 2023-06-14 DIAGNOSIS — I10 ESSENTIAL HYPERTENSION, BENIGN: ICD-10-CM

## 2023-06-14 PROCEDURE — 3079F DIAST BP 80-89 MM HG: CPT | Performed by: NURSE PRACTITIONER

## 2023-06-14 PROCEDURE — 3074F SYST BP LT 130 MM HG: CPT | Performed by: NURSE PRACTITIONER

## 2023-06-14 PROCEDURE — 99214 OFFICE O/P EST MOD 30 MIN: CPT | Performed by: NURSE PRACTITIONER

## 2023-06-14 PROCEDURE — 3008F BODY MASS INDEX DOCD: CPT | Performed by: NURSE PRACTITIONER

## 2023-06-14 ASSESSMENT — ENCOUNTER SYMPTOMS
FATIGUE: 0
ANOREXIA: 0
SHORTNESS OF BREATH: 0
PALPITATIONS: 0
HYPERTENSION: 1

## 2023-06-14 ASSESSMENT — PATIENT HEALTH QUESTIONNAIRE - PHQ9: SUM OF ALL RESPONSES TO PHQ9 QUESTIONS 1 & 2: 0

## 2023-06-14 ASSESSMENT — PAIN SCALES - GENERAL: PAINLEVEL: 0-NO PAIN

## 2023-06-14 NOTE — PROGRESS NOTES
[facility] at [address]  [place] [address]  [phone]  Fax [phone]      Patient ID: Riki Bundy                              : 1965    Visit Date: 2023    Chief Complaint: Follow-up         Patient ID: Riki Bundy is a 58 y.o. male.    Patient Active Problem List   Diagnosis   • Essential hypertension, benign   • Acute allergic rhinitis   • ADHD, predominantly inattentive type   • Displacement of lumbar intervertebral disc without myelopathy   • Cervicalgia   • Primary osteoarthritis   • Adenomatous colon polyp   • FH: CAD (coronary artery disease)   • Mixed hyperlipidemia         Current Outpatient Medications:   •  amLODIPine (NORVASC) 5 mg tablet, Take 1 tablet (5 mg total) by mouth daily., Disp: 90 tablet, Rfl: 1  •  amphetamine-dextroamphetamine XR (ADDERALL XR) 30 mg 24 hr capsule, Take 1 capsule (30 mg total) by mouth every morning., Disp: 30 capsule, Rfl: 0  •  candesartan-hydrochlorothiazid (ATACAND HCT) 32-12.5 mg per tablet, Take 1 tablet by mouth once daily., Disp: 90 tablet, Rfl: 1  •  CRESTOR 10 mg tablet, Take 1 tablet (10 mg total) by mouth once daily., Disp: 90 tablet, Rfl: 1  •  levocetirizine (XYZAL) 5 mg tablet, Take 1 tablet (5 mg total) by mouth every evening. (Patient taking differently: Take 5 mg by mouth as needed.), Disp: 90 tablet, Rfl: 1  •  naproxen (NAPROSYN) 500 mg tablet, Take 1 tablet (500 mg total) by mouth 2 (two) times a day as needed for moderate pain., Disp: 60 tablet, Rfl: 2    Allergies   Allergen Reactions   • Sulfa (Sulfonamide Antibiotics) Other (see comments)     Get fever to the point of fainting   • Cephalosporins Rash       Social History     Tobacco Use   • Smoking status: Never   • Smokeless tobacco: Never   Substance Use Topics   • Alcohol use: Yes     Alcohol/week: 28.0 standard drinks of alcohol     Types: 28 Cans of beer per week   • Drug use: Never       Health  Maintenance   Topic Date Due   • COVID-19 Vaccine (3 - Booster for Moderna series) 06/25/2021   • Depression Screening  06/14/2024   • Colorectal Cancer Screening  03/11/2031   • DTaP, Tdap, and Td Vaccines (3 - Td or Tdap) 06/22/2032   • Zoster Vaccine  Completed   • Influenza Vaccine  Completed   • Hepatitis C Screening  Completed   • Meningococcal ACWY  Aged Out   • HIB Vaccines  Aged Out   • IPV Vaccines  Aged Out   • HPV Vaccines  Aged Out   • Pneumococcal  Aged Out   • Hepatitis B Vaccines  Discontinued   • HIV Screening  Discontinued       HPI  Routine med check    Hypertension  This is a chronic problem. The current episode started more than 1 year ago. The problem is unchanged. The problem is controlled. Pertinent negatives include no chest pain, palpitations, peripheral edema or shortness of breath. There are no associated agents to hypertension. Past treatments include calcium channel blockers and diuretics. The current treatment provides significant improvement. There are no compliance problems.  There is no history of chronic renal disease.   Hyperlipidemia  This is a chronic problem. The current episode started more than 1 year ago. He has no history of chronic renal disease, diabetes, hypothyroidism, liver disease, obesity or nephrotic syndrome. There are no known factors aggravating his hyperlipidemia. Pertinent negatives include no chest pain or shortness of breath. Current antihyperlipidemic treatment includes statins. The current treatment provides significant improvement of lipids. There are no compliance problems.    ADHD  This is a chronic problem. The current episode started more than 1 year ago. The problem occurs daily. Progression since onset: stable. Pertinent negatives include no anorexia, chest pain or fatigue. Associated symptoms comments: No palpitations  No insomnia. Nothing aggravates the symptoms. Treatments tried: Adderall XR. The treatment provided significant relief.       The  "following have been reviewed and updated as appropriate in this visit:   Allergies  Meds  Problems         Review of System  Review of Systems   Constitutional: Negative for fatigue.   Respiratory: Negative for shortness of breath.    Cardiovascular: Negative for chest pain and palpitations.   Gastrointestinal: Negative for anorexia.       Objective     Vitals  Vitals:    06/14/23 0834   BP: 122/82   BP Location: Left upper arm   Patient Position: Sitting   Pulse: 89   Resp: 18   Temp: 36.6 °C (97.8 °F)   TempSrc: Temporal   SpO2: 99%   Weight: 77 kg (169 lb 12.8 oz)   Height: 1.803 m (5' 10.98\")     Body mass index is 23.69 kg/m².    Physical Exam  Vitals reviewed.   Constitutional:       General: He is not in acute distress.     Appearance: Normal appearance. He is not diaphoretic.   Cardiovascular:      Rate and Rhythm: Normal rate and regular rhythm.      Heart sounds: No murmur heard.     No friction rub. No gallop.   Pulmonary:      Effort: Pulmonary effort is normal.      Breath sounds: Normal breath sounds. No wheezing, rhonchi or rales.   Musculoskeletal:      Right lower leg: No edema.      Left lower leg: No edema.   Neurological:      Mental Status: He is alert and oriented to person, place, and time.         Assessment/Plan     Problem List Items Addressed This Visit     ADHD, predominantly inattentive type - Primary (Chronic)     Stable on Adderall XR  Continue med  Follow up 4 months         Essential hypertension, benign     BP stable  No changes   Continue current meds         Mixed hyperlipidemia     Check lipids on statin.        Other Visit Diagnoses     Preventative health care        Relevant Orders    CBC and Differential    Comprehensive metabolic panel    Lipid panel    Urinalysis with Reflex Culture (ED and Outpatient only)    Prostate cancer screening        Relevant Orders    PSA              LAURA Baum  6/14/2023  "

## 2023-06-26 DIAGNOSIS — I10 ESSENTIAL HYPERTENSION, BENIGN: ICD-10-CM

## 2023-06-26 DIAGNOSIS — E78.00 HYPERCHOLESTEREMIA: ICD-10-CM

## 2023-06-26 RX ORDER — ROSUVASTATIN CALCIUM 10 MG/1
10 TABLET, FILM COATED ORAL
Qty: 90 TABLET | Refills: 1 | Status: SHIPPED | OUTPATIENT
Start: 2023-06-26 | End: 2024-01-11

## 2023-06-26 RX ORDER — CANDESARTAN CILEXETIL AND HYDROCHLOROTHIAZIDE 32; 12.5 MG/1; MG/1
1 TABLET ORAL
Qty: 90 TABLET | Refills: 1 | Status: SHIPPED | OUTPATIENT
Start: 2023-06-26 | End: 2024-01-11

## 2023-06-26 RX ORDER — AMLODIPINE BESYLATE 5 MG/1
5 TABLET ORAL DAILY
Qty: 90 TABLET | Refills: 1 | Status: SHIPPED | OUTPATIENT
Start: 2023-06-26 | End: 2024-02-19

## 2023-06-26 NOTE — TELEPHONE ENCOUNTER
Medicine Refill Request    Last Office: 6/14/2023   Last Consult Visit: Visit date not found  Last Telemedicine Visit: 1/26/2022 Gilda Wayne MD    Next Appointment: 10/17/2023      Current Outpatient Medications:   •  amLODIPine (NORVASC) 5 mg tablet, Take 1 tablet (5 mg total) by mouth daily., Disp: 90 tablet, Rfl: 1  •  amphetamine-dextroamphetamine XR (ADDERALL XR) 30 mg 24 hr capsule, Take 1 capsule (30 mg total) by mouth every morning., Disp: 30 capsule, Rfl: 0  •  candesartan-hydrochlorothiazid (ATACAND HCT) 32-12.5 mg per tablet, Take 1 tablet by mouth once daily., Disp: 90 tablet, Rfl: 1  •  CRESTOR 10 mg tablet, Take 1 tablet (10 mg total) by mouth once daily., Disp: 90 tablet, Rfl: 1  •  levocetirizine (XYZAL) 5 mg tablet, Take 1 tablet (5 mg total) by mouth every evening. (Patient taking differently: Take 5 mg by mouth as needed.), Disp: 90 tablet, Rfl: 1  •  naproxen (NAPROSYN) 500 mg tablet, Take 1 tablet (500 mg total) by mouth 2 (two) times a day as needed for moderate pain., Disp: 60 tablet, Rfl: 2      BP Readings from Last 3 Encounters:   06/14/23 122/82   03/14/23 124/80   12/13/22 122/72       Recent Lab results:  Lab Results   Component Value Date    CHOL 240 (H) 05/03/2022   ,   Lab Results   Component Value Date    HDL 30 (L) 05/03/2022   ,   Lab Results   Component Value Date    LDLCALC SEE COMMENT 05/03/2022   ,   Lab Results   Component Value Date    TRIG 1,102 (H) 05/03/2022        Lab Results   Component Value Date    GLUCOSE 121 (H) 05/03/2022    GLUCOSE NEGATIVE 05/03/2022   ,   Lab Results   Component Value Date    HGBA1C 5.5 05/03/2022         Lab Results   Component Value Date    CREATININE 0.88 05/03/2022       Lab Results   Component Value Date    TSH 4.41 05/03/2022

## 2023-07-28 DIAGNOSIS — F90.0 ADHD, PREDOMINANTLY INATTENTIVE TYPE: Primary | Chronic | ICD-10-CM

## 2023-07-28 RX ORDER — DEXTROAMPHETAMINE SACCHARATE, AMPHETAMINE ASPARTATE MONOHYDRATE, DEXTROAMPHETAMINE SULFATE AND AMPHETAMINE SULFATE 3.75; 3.75; 3.75; 3.75 MG/1; MG/1; MG/1; MG/1
30 CAPSULE, EXTENDED RELEASE ORAL EVERY MORNING
Qty: 60 CAPSULE | Refills: 0 | Status: SHIPPED | OUTPATIENT
Start: 2023-07-28 | End: 2023-09-26 | Stop reason: ALTCHOICE

## 2023-07-28 RX ORDER — DEXTROAMPHETAMINE SACCHARATE, AMPHETAMINE ASPARTATE MONOHYDRATE, DEXTROAMPHETAMINE SULFATE AND AMPHETAMINE SULFATE 3.75; 3.75; 3.75; 3.75 MG/1; MG/1; MG/1; MG/1
30 CAPSULE, EXTENDED RELEASE ORAL EVERY MORNING
COMMUNITY
End: 2023-07-28 | Stop reason: SDUPTHER

## 2023-07-28 NOTE — TELEPHONE ENCOUNTER
Medicine Refill Request    Last Office Visit: 6/14/2023   Last Consult Visit: Visit date not found  Last Telemedicine Visit: 1/26/2022 Gilda Wayne MD    Next Appointment: 10/17/2023    Pharmacy does not have the amphetamine-dextroamphetamine XR (ADDERALL XR) 30mg, needs to be the 15mg.    amphetamine-dextroamphetamine XR (ADDERALL XR) 15 mg 24 hr capsule, Take 2 capsule (15 mg total) by mouth every morning., Disp: 60 capsule, Rfl: 0    Prescription to go to the [facility] in [facility] on [address]      Current Outpatient Medications:   •  amLODIPine (NORVASC) 5 mg tablet, Take 1 tablet (5 mg total) by mouth daily., Disp: 90 tablet, Rfl: 1  •  amphetamine-dextroamphetamine XR (ADDERALL XR) 30 mg 24 hr capsule, Take 1 capsule (30 mg total) by mouth every morning., Disp: 30 capsule, Rfl: 0  •  candesartan-hydrochlorothiazid (ATACAND HCT) 32-12.5 mg per tablet, Take 1 tablet by mouth once daily., Disp: 90 tablet, Rfl: 1  •  CRESTOR 10 mg tablet, Take 1 tablet (10 mg total) by mouth once daily., Disp: 90 tablet, Rfl: 1  •  levocetirizine (XYZAL) 5 mg tablet, Take 1 tablet (5 mg total) by mouth every evening. (Patient taking differently: Take 5 mg by mouth as needed.), Disp: 90 tablet, Rfl: 1  •  naproxen (NAPROSYN) 500 mg tablet, Take 1 tablet (500 mg total) by mouth 2 (two) times a day as needed for moderate pain., Disp: 60 tablet, Rfl: 2      BP Readings from Last 3 Encounters:   06/14/23 122/82   03/14/23 124/80   12/13/22 122/72       Recent Lab results:  Lab Results   Component Value Date    CHOL 240 (H) 05/03/2022   ,   Lab Results   Component Value Date    HDL 30 (L) 05/03/2022   ,   Lab Results   Component Value Date    LDLCALC SEE COMMENT 05/03/2022   ,   Lab Results   Component Value Date    TRIG 1,102 (H) 05/03/2022        Lab Results   Component Value Date    GLUCOSE 121 (H) 05/03/2022    GLUCOSE NEGATIVE 05/03/2022   ,   Lab Results   Component Value Date    HGBA1C 5.5 05/03/2022         Lab Results    Component Value Date    CREATININE 0.88 05/03/2022       Lab Results   Component Value Date    TSH 4.41 05/03/2022           Lab Results   Component Value Date    HGBA1C 5.5 05/03/2022

## 2023-09-26 ENCOUNTER — TELEPHONE (OUTPATIENT)
Dept: PRIMARY CARE | Facility: CLINIC | Age: 58
End: 2023-09-26
Payer: COMMERCIAL

## 2023-10-17 ENCOUNTER — OFFICE VISIT (OUTPATIENT)
Dept: PRIMARY CARE | Facility: CLINIC | Age: 58
End: 2023-10-17
Payer: COMMERCIAL

## 2023-10-17 VITALS
DIASTOLIC BLOOD PRESSURE: 78 MMHG | OXYGEN SATURATION: 98 % | SYSTOLIC BLOOD PRESSURE: 128 MMHG | RESPIRATION RATE: 18 BRPM | WEIGHT: 169 LBS | BODY MASS INDEX: 23.66 KG/M2 | HEART RATE: 80 BPM | HEIGHT: 71 IN

## 2023-10-17 DIAGNOSIS — F90.0 ADHD, PREDOMINANTLY INATTENTIVE TYPE: Primary | Chronic | ICD-10-CM

## 2023-10-17 DIAGNOSIS — J02.9 ACUTE PHARYNGITIS, UNSPECIFIED ETIOLOGY: ICD-10-CM

## 2023-10-17 DIAGNOSIS — E78.2 MIXED HYPERLIPIDEMIA: ICD-10-CM

## 2023-10-17 DIAGNOSIS — I10 ESSENTIAL HYPERTENSION, BENIGN: ICD-10-CM

## 2023-10-17 PROBLEM — M54.2 CERVICALGIA: Status: RESOLVED | Noted: 2019-01-09 | Resolved: 2023-10-17

## 2023-10-17 PROCEDURE — 3078F DIAST BP <80 MM HG: CPT | Performed by: NURSE PRACTITIONER

## 2023-10-17 PROCEDURE — 99214 OFFICE O/P EST MOD 30 MIN: CPT | Performed by: NURSE PRACTITIONER

## 2023-10-17 PROCEDURE — 3008F BODY MASS INDEX DOCD: CPT | Performed by: NURSE PRACTITIONER

## 2023-10-17 PROCEDURE — 3074F SYST BP LT 130 MM HG: CPT | Performed by: NURSE PRACTITIONER

## 2023-10-17 ASSESSMENT — ENCOUNTER SYMPTOMS
SWOLLEN GLANDS: 1
SHORTNESS OF BREATH: 0
HEADACHES: 1
COUGH: 0
DIAPHORESIS: 0
ANOREXIA: 0
SORE THROAT: 1
NECK PAIN: 0
SINUS PRESSURE: 1
HYPERTENSION: 1
PALPITATIONS: 0
SINUS COMPLAINT: 1
HOARSE VOICE: 0
CHILLS: 0
FEVER: 0

## 2023-10-17 NOTE — ASSESSMENT & PLAN NOTE
COVID and RSV swab ordered  To consider course of antibiotic if symptoms persist.  Push Po fluids  Salt water gargles

## 2023-10-17 NOTE — PROGRESS NOTES
[facility] at [address]  [place] [address]  [phone]  Fax [phone]      Patient ID: Riki Bundy                              : 1965    Visit Date: 10/17/2023    Chief Complaint: Follow-up and URI         Patient ID: Riki Bundy is a 58 y.o. male.    Patient Active Problem List   Diagnosis    Essential hypertension, benign    Acute allergic rhinitis    ADHD, predominantly inattentive type    Displacement of lumbar intervertebral disc without myelopathy    Primary osteoarthritis    Adenomatous colon polyp    FH: CAD (coronary artery disease)    Mixed hyperlipidemia    Acute pharyngitis         Current Outpatient Medications:     amLODIPine (NORVASC) 5 mg tablet, Take 1 tablet (5 mg total) by mouth daily., Disp: 90 tablet, Rfl: 1    amphetamine-dextroamphetamine XR (ADDERALL XR) 30 mg 24 hr capsule, Take 1 capsule (30 mg total) by mouth every morning., Disp: 30 capsule, Rfl: 0    candesartan-hydrochlorothiazid (ATACAND HCT) 32-12.5 mg per tablet, Take 1 tablet by mouth once daily., Disp: 90 tablet, Rfl: 1    CRESTOR 10 mg tablet, Take 1 tablet (10 mg total) by mouth once daily., Disp: 90 tablet, Rfl: 1    levocetirizine (XYZAL) 5 mg tablet, Take 1 tablet (5 mg total) by mouth every evening. (Patient taking differently: Take 5 mg by mouth as needed.), Disp: 90 tablet, Rfl: 1    naproxen (NAPROSYN) 500 mg tablet, Take 1 tablet (500 mg total) by mouth 2 (two) times a day as needed for moderate pain., Disp: 60 tablet, Rfl: 2    Allergies   Allergen Reactions    Sulfa (Sulfonamide Antibiotics) Other (see comments)     Get fever to the point of fainting    Cephalosporins Rash       Social History     Tobacco Use    Smoking status: Never    Smokeless tobacco: Never   Substance Use Topics    Alcohol use: Yes     Alcohol/week: 28.0 standard drinks of alcohol     Types: 28 Cans of beer per week    Drug use: Never        Health Maintenance   Topic Date Due    Influenza Vaccine (1) 08/01/2023    COVID-19 Vaccine (3 - 2023-24 season) 09/01/2023    Depression Screening  06/14/2024    Colorectal Cancer Screening  03/11/2031    DTaP, Tdap, and Td Vaccines (3 - Td or Tdap) 06/22/2032    Zoster Vaccine  Completed    Hepatitis C Screening  Completed    Meningococcal ACWY  Aged Out    HIB Vaccines  Aged Out    IPV Vaccines  Aged Out    HPV Vaccines  Aged Out    Pneumococcal  Aged Out    Hepatitis B Vaccines  Discontinued    HIV Screening  Discontinued       HPI  Routine ADD med check.  Sore throat and sinus pain for 3 days    ADHD  This is a chronic problem. The current episode started more than 1 year ago. The problem occurs daily. Progression since onset: stable. Associated symptoms include headaches, a sore throat and swollen glands. Pertinent negatives include no anorexia, chest pain, chills, congestion, coughing, diaphoresis, fever or neck pain. Associated symptoms comments: No palpitations  No insomnia. Treatments tried: Adderall XR. The treatment provided significant relief.   Hypertension  This is a chronic problem. The current episode started more than 1 year ago. The problem is controlled. Associated symptoms include headaches. Pertinent negatives include no chest pain, neck pain, palpitations, peripheral edema or shortness of breath. There are no associated agents to hypertension. Past treatments include calcium channel blockers. The current treatment provides significant improvement. There are no compliance problems.    Hyperlipidemia  This is a chronic problem. The current episode started more than 1 year ago. The problem is controlled. Recent lipid tests were reviewed and are normal. Pertinent negatives include no chest pain or shortness of breath. Current antihyperlipidemic treatment includes statins. The current treatment provides significant improvement of lipids. There are no compliance problems.   "  Sinus Problem  This is a new problem. The current episode started in the past 7 days. The problem is unchanged. There has been no fever. The pain is moderate. Associated symptoms include headaches, sinus pressure, a sore throat and swollen glands. Pertinent negatives include no chills, congestion, coughing, diaphoresis, ear pain, hoarse voice, neck pain, shortness of breath or sneezing. Past treatments include nothing.       The following have been reviewed and updated as appropriate in this visit:   Allergies  Meds  Problems         Review of System  Review of Systems   Constitutional: Negative for chills, diaphoresis and fever.   HENT: Positive for sinus pressure and sore throat. Negative for congestion, ear pain, hoarse voice and sneezing.    Respiratory: Negative for cough and shortness of breath.    Cardiovascular: Negative for chest pain and palpitations.   Gastrointestinal: Negative for anorexia.   Musculoskeletal: Negative for neck pain.   Neurological: Positive for headaches.       Objective     Vitals  Vitals:    10/17/23 0808   BP: 128/78   BP Location: Left upper arm   Patient Position: Sitting   Pulse: 80   Resp: 18   SpO2: 98%   Weight: 76.7 kg (169 lb)   Height: 1.803 m (5' 10.98\")     Body mass index is 23.58 kg/m².      Physical Exam  Vitals reviewed.   Constitutional:       General: He is not in acute distress.     Appearance: Normal appearance. He is not ill-appearing, toxic-appearing or diaphoretic.   HENT:      Right Ear: Tympanic membrane, ear canal and external ear normal.      Left Ear: Tympanic membrane, ear canal and external ear normal.      Nose:      Right Sinus: Maxillary sinus tenderness and frontal sinus tenderness present.      Left Sinus: Maxillary sinus tenderness and frontal sinus tenderness present.      Mouth/Throat:      Pharynx: Pharyngeal swelling and posterior oropharyngeal erythema present. No oropharyngeal exudate or uvula swelling.   Cardiovascular:      Rate and " Rhythm: Normal rate and regular rhythm.      Heart sounds: No murmur heard.     No friction rub. No gallop.   Pulmonary:      Effort: Pulmonary effort is normal.      Breath sounds: Normal breath sounds. No wheezing, rhonchi or rales.   Musculoskeletal:      Cervical back: Neck supple. Tenderness present. No rigidity.      Right lower leg: No edema.      Left lower leg: No edema.   Lymphadenopathy:      Cervical: No cervical adenopathy.   Neurological:      Mental Status: He is alert and oriented to person, place, and time.         Assessment/Plan     Problem List Items Addressed This Visit     ADHD, predominantly inattentive type - Primary (Chronic)     Stable on med.  Follow every 4 months         Essential hypertension, benign     BP stable on current meds  Follow up 4 months  Get labs done ordered at last visit.         Mixed hyperlipidemia     Check lipids         Acute pharyngitis     COVID and RSV swab ordered  To consider course of antibiotic if symptoms persist.  Push Po fluids  Salt water gargles         Relevant Orders    COVID-19 PCR [facility] (swab)    Respiratory virus panel, PCR           LAURA Baum  10/17/2023

## 2023-10-18 LAB
FLUAV H1 RNA NPH QL NAA+PROBE: DETECTED
FLUAV H3 RNA NPH QL NAA+PROBE: NOT DETECTED
FLUAV RNA NPH QL NAA+PROBE: DETECTED
FLUBV RNA NPH QL NAA+PROBE: NOT DETECTED
HADV DNA NPH QL NAA+PROBE: NOT DETECTED
HMPV RNA NPH QL NAA+PROBE: NOT DETECTED
HPIV1 RNA NPH QL NAA+PROBE: NOT DETECTED
HPIV2 RNA NPH QL NAA+PROBE: NOT DETECTED
HPIV3 RNA NPH QL NAA+PROBE: NOT DETECTED
RSV A RNA NPH QL NAA+PROBE: NOT DETECTED
RSV B RNA NPH QL NAA+PROBE: NOT DETECTED
RV+EV RNA SPEC QL NAA+PROBE: DETECTED
SERVICE CMNT-IMP: ABNORMAL

## 2023-10-19 LAB — SARS-COV-2 RNA RESP QL NAA+PROBE: NOT DETECTED

## 2024-01-11 DIAGNOSIS — E78.00 HYPERCHOLESTEREMIA: ICD-10-CM

## 2024-01-11 DIAGNOSIS — I10 ESSENTIAL HYPERTENSION, BENIGN: ICD-10-CM

## 2024-01-11 RX ORDER — CANDESARTAN CILEXETIL AND HYDROCHLOROTHIAZIDE 32; 12.5 MG/1; MG/1
1 TABLET ORAL DAILY
Qty: 30 TABLET | Refills: 5 | Status: SHIPPED | OUTPATIENT
Start: 2024-01-11 | End: 2024-02-22 | Stop reason: ALTCHOICE

## 2024-01-11 RX ORDER — ROSUVASTATIN CALCIUM 10 MG/1
10 TABLET, FILM COATED ORAL DAILY
Qty: 30 TABLET | Refills: 5 | Status: SHIPPED | OUTPATIENT
Start: 2024-01-11 | End: 2024-06-19

## 2024-01-11 NOTE — TELEPHONE ENCOUNTER
Medicine Refill Request    Last Office Visit: 10/17/2023   Last Consult Visit: Visit date not found  Last Telemedicine Visit: 1/26/2022 Gilda Wayne MD    Next Appointment: 2/13/2024      Current Outpatient Medications:   •  amLODIPine (NORVASC) 5 mg tablet, Take 1 tablet (5 mg total) by mouth daily., Disp: 90 tablet, Rfl: 1  •  amphetamine-dextroamphetamine XR (ADDERALL XR) 30 mg 24 hr capsule, Take 1 capsule (30 mg total) by mouth every morning., Disp: 30 capsule, Rfl: 0  •  candesartan-hydrochlorothiazid (ATACAND HCT) 32-12.5 mg per tablet, Take 1 tablet by mouth once daily., Disp: 90 tablet, Rfl: 1  •  CRESTOR 10 mg tablet, Take 1 tablet (10 mg total) by mouth once daily., Disp: 90 tablet, Rfl: 1  •  levocetirizine (XYZAL) 5 mg tablet, Take 1 tablet (5 mg total) by mouth every evening. (Patient taking differently: Take 5 mg by mouth as needed.), Disp: 90 tablet, Rfl: 1  •  naproxen (NAPROSYN) 500 mg tablet, Take 1 tablet (500 mg total) by mouth 2 (two) times a day as needed for moderate pain., Disp: 60 tablet, Rfl: 2      BP Readings from Last 3 Encounters:   10/17/23 128/78   06/14/23 122/82   03/14/23 124/80       Recent Lab results:  Lab Results   Component Value Date    CHOL 240 (H) 05/03/2022   ,   Lab Results   Component Value Date    HDL 30 (L) 05/03/2022   ,   Lab Results   Component Value Date    LDLCALC SEE COMMENT 05/03/2022   ,   Lab Results   Component Value Date    TRIG 1,102 (H) 05/03/2022        Lab Results   Component Value Date    GLUCOSE 121 (H) 05/03/2022    GLUCOSE NEGATIVE 05/03/2022   ,   Lab Results   Component Value Date    HGBA1C 5.5 05/03/2022         Lab Results   Component Value Date    CREATININE 0.88 05/03/2022       Lab Results   Component Value Date    TSH 4.41 05/03/2022           Lab Results   Component Value Date    HGBA1C 5.5 05/03/2022

## 2024-02-17 DIAGNOSIS — I10 ESSENTIAL HYPERTENSION, BENIGN: ICD-10-CM

## 2024-02-19 RX ORDER — AMLODIPINE BESYLATE 5 MG/1
5 TABLET ORAL DAILY
Qty: 30 TABLET | Refills: 5 | Status: SHIPPED | OUTPATIENT
Start: 2024-02-19 | End: 2024-05-21 | Stop reason: SDUPTHER

## 2024-02-19 NOTE — TELEPHONE ENCOUNTER
Medicine Refill Request    Last Office Visit: 10/17/2023   Last Consult Visit: Visit date not found  Last Telemedicine Visit: 1/26/2022 Gilda Wayne MD    Next Appointment: 2/22/2024      Current Outpatient Medications:   •  amLODIPine (NORVASC) 5 mg tablet, Take 1 tablet (5 mg total) by mouth daily., Disp: 90 tablet, Rfl: 1  •  amphetamine-dextroamphetamine XR (ADDERALL XR) 30 mg 24 hr capsule, Take 1 capsule (30 mg total) by mouth every morning., Disp: 30 capsule, Rfl: 0  •  candesartan-hydrochlorothiazid (ATACAND HCT) 32-12.5 mg per tablet, TAKE 1 TABLET BY MOUTH EVERY DAY, Disp: 30 tablet, Rfl: 5  •  CRESTOR 10 mg tablet, TAKE 1 TABLET BY MOUTH EVERY DAY, Disp: 30 tablet, Rfl: 5  •  levocetirizine (XYZAL) 5 mg tablet, Take 1 tablet (5 mg total) by mouth every evening. (Patient taking differently: Take 5 mg by mouth as needed.), Disp: 90 tablet, Rfl: 1  •  naproxen (NAPROSYN) 500 mg tablet, Take 1 tablet (500 mg total) by mouth 2 (two) times a day as needed for moderate pain., Disp: 60 tablet, Rfl: 2      BP Readings from Last 3 Encounters:   10/17/23 128/78   06/14/23 122/82   03/14/23 124/80       Recent Lab results:  Lab Results   Component Value Date    CHOL 240 (H) 05/03/2022   ,   Lab Results   Component Value Date    HDL 30 (L) 05/03/2022   ,   Lab Results   Component Value Date    LDLCALC SEE COMMENT 05/03/2022   ,   Lab Results   Component Value Date    TRIG 1,102 (H) 05/03/2022        Lab Results   Component Value Date    GLUCOSE 121 (H) 05/03/2022    GLUCOSE NEGATIVE 05/03/2022   ,   Lab Results   Component Value Date    HGBA1C 5.5 05/03/2022         Lab Results   Component Value Date    CREATININE 0.88 05/03/2022       Lab Results   Component Value Date    TSH 4.41 05/03/2022           Lab Results   Component Value Date    HGBA1C 5.5 05/03/2022

## 2024-02-22 ENCOUNTER — OFFICE VISIT (OUTPATIENT)
Dept: PRIMARY CARE | Facility: CLINIC | Age: 59
End: 2024-02-22
Payer: COMMERCIAL

## 2024-02-22 VITALS
HEART RATE: 70 BPM | RESPIRATION RATE: 18 BRPM | BODY MASS INDEX: 23.91 KG/M2 | WEIGHT: 170.8 LBS | OXYGEN SATURATION: 99 % | HEIGHT: 71 IN | TEMPERATURE: 97.6 F | SYSTOLIC BLOOD PRESSURE: 128 MMHG | DIASTOLIC BLOOD PRESSURE: 72 MMHG

## 2024-02-22 DIAGNOSIS — E78.2 MIXED HYPERLIPIDEMIA: ICD-10-CM

## 2024-02-22 DIAGNOSIS — F90.0 ADHD, PREDOMINANTLY INATTENTIVE TYPE: Primary | Chronic | ICD-10-CM

## 2024-02-22 DIAGNOSIS — I10 ESSENTIAL HYPERTENSION, BENIGN: ICD-10-CM

## 2024-02-22 DIAGNOSIS — Z12.5 PROSTATE CANCER SCREENING: ICD-10-CM

## 2024-02-22 PROBLEM — J02.9 ACUTE PHARYNGITIS: Status: RESOLVED | Noted: 2023-10-17 | Resolved: 2024-02-22

## 2024-02-22 PROCEDURE — 3008F BODY MASS INDEX DOCD: CPT | Performed by: NURSE PRACTITIONER

## 2024-02-22 PROCEDURE — 3074F SYST BP LT 130 MM HG: CPT | Performed by: NURSE PRACTITIONER

## 2024-02-22 PROCEDURE — 3078F DIAST BP <80 MM HG: CPT | Performed by: NURSE PRACTITIONER

## 2024-02-22 PROCEDURE — 99214 OFFICE O/P EST MOD 30 MIN: CPT | Performed by: NURSE PRACTITIONER

## 2024-02-22 RX ORDER — HYDROCHLOROTHIAZIDE 12.5 MG/1
12.5 TABLET ORAL DAILY
Qty: 90 TABLET | Refills: 1 | Status: SHIPPED | OUTPATIENT
Start: 2024-02-22 | End: 2024-05-03 | Stop reason: ALTCHOICE

## 2024-02-22 RX ORDER — CANDESARTAN 32 MG/1
32 TABLET ORAL DAILY
Qty: 90 TABLET | Refills: 1 | Status: SHIPPED | OUTPATIENT
Start: 2024-02-22 | End: 2024-05-03 | Stop reason: ALTCHOICE

## 2024-02-22 ASSESSMENT — ENCOUNTER SYMPTOMS
HYPERTENSION: 1
ANOREXIA: 0
SHORTNESS OF BREATH: 0
MYALGIAS: 0
FATIGUE: 0
PALPITATIONS: 0

## 2024-02-22 ASSESSMENT — PAIN SCALES - GENERAL: PAINLEVEL: 0-NO PAIN

## 2024-02-22 NOTE — ASSESSMENT & PLAN NOTE
BP stable  Pt wants separate BP meds due to availability.  Sent today  Follow up 4 months  Get surveillance labs done.

## 2024-02-22 NOTE — PROGRESS NOTES
[facility] at [address]  [place] [address]  [phone]  Fax [phone]      Patient ID: Riki Bundy                              : 1965    Visit Date: 2024    Chief Complaint: Follow-up         Patient ID: Riki Bundy is a 59 y.o. male.    Patient Active Problem List   Diagnosis   • Essential hypertension, benign   • Acute allergic rhinitis   • ADHD, predominantly inattentive type   • Displacement of lumbar intervertebral disc without myelopathy   • Primary osteoarthritis   • Adenomatous colon polyp   • FH: CAD (coronary artery disease)   • Mixed hyperlipidemia         Current Outpatient Medications:   •  amLODIPine (NORVASC) 5 mg tablet, TAKE 1 TABLET (5 MG TOTAL) BY MOUTH DAILY., Disp: 30 tablet, Rfl: 5  •  amphetamine-dextroamphetamine XR (ADDERALL XR) 30 mg 24 hr capsule, Take 1 capsule (30 mg total) by mouth every morning., Disp: 30 capsule, Rfl: 0  •  candesartan (ATACAND) 32 mg tablet, Take 1 tablet (32 mg total) by mouth daily., Disp: 90 tablet, Rfl: 1  •  CRESTOR 10 mg tablet, TAKE 1 TABLET BY MOUTH EVERY DAY, Disp: 30 tablet, Rfl: 5  •  hydrochlorothiazide 12.5 mg tablet, Take 1 tablet (12.5 mg total) by mouth daily., Disp: 90 tablet, Rfl: 1  •  levocetirizine (XYZAL) 5 mg tablet, Take 1 tablet (5 mg total) by mouth every evening. (Patient taking differently: Take 5 mg by mouth as needed.), Disp: 90 tablet, Rfl: 1  •  naproxen (NAPROSYN) 500 mg tablet, Take 1 tablet (500 mg total) by mouth 2 (two) times a day as needed for moderate pain., Disp: 60 tablet, Rfl: 2    Allergies   Allergen Reactions   • Sulfa (Sulfonamide Antibiotics) Other (see comments)     Get fever to the point of fainting   • Cephalosporins Rash       Social History     Tobacco Use   • Smoking status: Never   • Smokeless tobacco: Never   Substance Use Topics   • Alcohol use: Yes     Alcohol/week: 28.0 standard drinks of alcohol     Types: 28 Cans of  beer per week   • Drug use: Never       Health Maintenance   Topic Date Due   • Influenza Vaccine (1) 06/30/2024 (Originally 8/1/2023)   • RSV (60+ years old [shared decision making] or in pregnancy during 32 through 36 weeks) (1 - 1-dose 60+ series) 02/22/2025 (Originally 2/9/2024)   • COVID-19 Vaccine (3 - 2023-24 season) 02/22/2025 (Originally 9/1/2023)   • Depression Screening  06/14/2024   • Colorectal Cancer Screening  03/11/2031   • DTaP, Tdap, and Td Vaccines (3 - Td or Tdap) 06/22/2032   • Zoster Vaccine  Completed   • Hepatitis C Screening  Completed   • Meningococcal ACWY  Aged Out   • RSV <20 months  Aged Out   • HIB Vaccines  Aged Out   • IPV Vaccines  Aged Out   • HPV Vaccines  Aged Out   • Pneumococcal  Aged Out   • Hepatitis B Vaccines  Discontinued   • HIV Screening  Discontinued       HPI  Routine med check    ADHD  This is a chronic problem. The current episode started more than 1 year ago. The problem occurs daily. Progression since onset: stable. Pertinent negatives include no anorexia, chest pain, fatigue or myalgias. Associated symptoms comments: No palpitations  No insomnia. Nothing aggravates the symptoms. Treatments tried: Adderall XR. The treatment provided significant relief.   Hypertension  This is a chronic problem. The current episode started more than 1 year ago. The problem is controlled. Pertinent negatives include no chest pain, palpitations, peripheral edema or shortness of breath. There are no associated agents to hypertension. Past treatments include angiotensin blockers, calcium channel blockers and diuretics. The current treatment provides significant improvement. There are no compliance problems.    Hyperlipidemia  This is a chronic problem. The current episode started more than 1 year ago. The problem is controlled. Recent lipid tests were reviewed and are normal. Pertinent negatives include no chest pain, myalgias or shortness of breath. Current antihyperlipidemic treatment  "includes statins. The current treatment provides significant improvement of lipids. There are no compliance problems.        The following have been reviewed and updated as appropriate in this visit:   Allergies  Meds  Problems         Review of System  Review of Systems   Constitutional: Negative for fatigue.   Respiratory: Negative for shortness of breath.    Cardiovascular: Negative for chest pain and palpitations.   Gastrointestinal: Negative for anorexia.   Musculoskeletal: Negative for myalgias.       Objective     Vitals  Vitals:    02/22/24 1111   BP: 128/72   BP Location: Left upper arm   Patient Position: Sitting   Pulse: 70   Resp: 18   Temp: 36.4 °C (97.6 °F)   TempSrc: Temporal   SpO2: 99%   Weight: 77.5 kg (170 lb 12.8 oz)   Height: 1.803 m (5' 10.98\")     Body mass index is 23.83 kg/m².    Physical Exam  Physical Exam  Vitals reviewed.   Constitutional:       General: He is not in acute distress.     Appearance: Normal appearance. He is not ill-appearing, toxic-appearing or diaphoretic.   Cardiovascular:      Rate and Rhythm: Normal rate and regular rhythm.      Heart sounds: No murmur heard.     No friction rub. No gallop.   Pulmonary:      Effort: Pulmonary effort is normal.      Breath sounds: Normal breath sounds. No wheezing, rhonchi or rales.   Musculoskeletal:      Right lower leg: No edema.      Left lower leg: No edema.   Neurological:      Mental Status: He is alert and oriented to person, place, and time.   Psychiatric:         Mood and Affect: Mood and affect normal.         Speech: Speech normal.         Behavior: Behavior normal.         Assessment/Plan     Problem List Items Addressed This Visit     ADHD, predominantly inattentive type - Primary (Chronic)     Stable on med  Follow up 4 months  Get labs done prior to next appt!         Essential hypertension, benign     BP stable  Pt wants separate BP meds due to availability.  Sent today  Follow up 4 months  Get surveillance labs " done.         Relevant Medications    candesartan (ATACAND) 32 mg tablet    hydrochlorothiazide 12.5 mg tablet    Other Relevant Orders    CBC and Differential    Comprehensive metabolic panel    Urinalysis with Reflex Culture (ED and Outpatient only)    Mixed hyperlipidemia     Overdue for labs  Lab order updated  Continue statin.         Relevant Orders    Comprehensive metabolic panel    Lipid panel   Other Visit Diagnoses     Prostate cancer screening        Relevant Orders    PSA              LAURA Baum  2/22/2024

## 2024-05-03 ENCOUNTER — OFFICE VISIT (OUTPATIENT)
Dept: PRIMARY CARE | Facility: CLINIC | Age: 59
End: 2024-05-03
Payer: COMMERCIAL

## 2024-05-03 VITALS
RESPIRATION RATE: 18 BRPM | HEIGHT: 71 IN | DIASTOLIC BLOOD PRESSURE: 74 MMHG | SYSTOLIC BLOOD PRESSURE: 138 MMHG | OXYGEN SATURATION: 97 % | WEIGHT: 170 LBS | HEART RATE: 108 BPM | TEMPERATURE: 99.2 F | BODY MASS INDEX: 23.8 KG/M2

## 2024-05-03 DIAGNOSIS — J32.9 SINOBRONCHITIS: Primary | ICD-10-CM

## 2024-05-03 DIAGNOSIS — J40 SINOBRONCHITIS: Primary | ICD-10-CM

## 2024-05-03 PROCEDURE — 3078F DIAST BP <80 MM HG: CPT | Performed by: NURSE PRACTITIONER

## 2024-05-03 PROCEDURE — 3008F BODY MASS INDEX DOCD: CPT | Performed by: NURSE PRACTITIONER

## 2024-05-03 PROCEDURE — 99213 OFFICE O/P EST LOW 20 MIN: CPT | Performed by: NURSE PRACTITIONER

## 2024-05-03 PROCEDURE — 3075F SYST BP GE 130 - 139MM HG: CPT | Performed by: NURSE PRACTITIONER

## 2024-05-03 RX ORDER — CANDESARTAN CILEXETIL AND HYDROCHLOROTHIAZIDE 32; 12.5 MG/1; MG/1
TABLET ORAL
COMMUNITY
Start: 2024-03-20 | End: 2025-01-31 | Stop reason: ALTCHOICE

## 2024-05-03 RX ORDER — AMOXICILLIN AND CLAVULANATE POTASSIUM 875; 125 MG/1; MG/1
1 TABLET, FILM COATED ORAL 2 TIMES DAILY
Qty: 20 TABLET | Refills: 0 | Status: SHIPPED | OUTPATIENT
Start: 2024-05-03 | End: 2024-05-13

## 2024-05-03 RX ORDER — PREDNISONE 10 MG/1
TABLET ORAL
Qty: 21 TABLET | Refills: 0 | Status: SHIPPED | OUTPATIENT
Start: 2024-05-03 | End: 2024-09-25 | Stop reason: ALTCHOICE

## 2024-05-03 ASSESSMENT — ENCOUNTER SYMPTOMS
SINUS PRESSURE: 1
SHORTNESS OF BREATH: 0
NECK PAIN: 0
HEADACHES: 1
SINUS COMPLAINT: 1
SWOLLEN GLANDS: 0
SORE THROAT: 1
WHEEZING: 1
CHILLS: 0
COUGH: 1
HOARSE VOICE: 1
MYALGIAS: 0

## 2024-05-03 ASSESSMENT — PAIN SCALES - GENERAL: PAINLEVEL: 0-NO PAIN

## 2024-05-03 NOTE — PROGRESS NOTES
[facility] at [address]  [place] [address]  [phone]  Fax [phone]      Patient ID: Riki Bundy                              : 1965    Visit Date: 5/3/2024    Chief Complaint: Sore Throat (Had fever of 102 yesterday, increased mucus , temp this am 101.0)         Patient ID: Riki Bundy is a 59 y.o. male.    Patient Active Problem List   Diagnosis    Essential hypertension, benign    Acute allergic rhinitis    ADHD, predominantly inattentive type    Displacement of lumbar intervertebral disc without myelopathy    Primary osteoarthritis    Adenomatous colon polyp    FH: CAD (coronary artery disease)    Mixed hyperlipidemia    Sinobronchitis         Current Outpatient Medications:     amoxicillin-pot clavulanate (AUGMENTIN) 875-125 mg per tablet, Take 1 tablet by mouth 2 (two) times a day for 10 days. With food, Disp: 20 tablet, Rfl: 0    candesartan-hydrochlorothiazid (ATACAND HCT) 32-12.5 mg per tablet, , Disp: , Rfl:     predniSONE (DELTASONE) 10 mg tablet, 2 po BID x 3 then 1 po BID x 3 then 1 po QD x 3. Take with food., Disp: 21 tablet, Rfl: 0    amLODIPine (NORVASC) 5 mg tablet, TAKE 1 TABLET (5 MG TOTAL) BY MOUTH DAILY., Disp: 30 tablet, Rfl: 5    amphetamine-dextroamphetamine XR (ADDERALL XR) 30 mg 24 hr capsule, Take 1 capsule (30 mg total) by mouth every morning., Disp: 30 capsule, Rfl: 0    CRESTOR 10 mg tablet, TAKE 1 TABLET BY MOUTH EVERY DAY, Disp: 30 tablet, Rfl: 5    levocetirizine (XYZAL) 5 mg tablet, Take 1 tablet (5 mg total) by mouth every evening. (Patient taking differently: Take 5 mg by mouth as needed.), Disp: 90 tablet, Rfl: 1    naproxen (NAPROSYN) 500 mg tablet, Take 1 tablet (500 mg total) by mouth 2 (two) times a day as needed for moderate pain., Disp: 60 tablet, Rfl: 2    Allergies   Allergen Reactions    Sulfa (Sulfonamide Antibiotics) Other (see comments)     Get fever to the point of fainting     Cephalosporins Rash       Social History     Tobacco Use    Smoking status: Never    Smokeless tobacco: Never   Substance Use Topics    Alcohol use: Yes     Alcohol/week: 28.0 standard drinks of alcohol     Types: 28 Cans of beer per week    Drug use: Never       Health Maintenance   Topic Date Due    RSV (60+ years old [shared decision making] or in pregnancy during 32 through 36 weeks) (1 - 1-dose 60+ series) 02/22/2025 (Originally 2/9/2024)    COVID-19 Vaccine (3 - 2023-24 season) 02/22/2025 (Originally 9/1/2023)    Depression Screening  06/14/2024    Influenza Vaccine (Season Ended) 08/01/2024    Colorectal Cancer Screening  03/11/2031    DTaP, Tdap, and Td Vaccines (3 - Td or Tdap) 06/22/2032    Zoster Vaccine  Completed    Hepatitis C Screening  Completed    Meningococcal ACWY  Aged Out    RSV <20 months  Aged Out    HIB Vaccines  Aged Out    IPV Vaccines  Aged Out    HPV Vaccines  Aged Out    Pneumococcal  Aged Out    Hepatitis B Vaccines  Discontinued    HIV Screening  Discontinued       HPI  Sinus and chest congestion for the past week  Fever earlier in week up to 102.    Sinus Problem  This is a new problem. The current episode started 1 to 4 weeks ago. The problem is unchanged. The maximum temperature recorded prior to his arrival was 102 - 102.9 F. The fever has been present for 1 to 2 days. The pain is mild. Associated symptoms include congestion, coughing, headaches, a hoarse voice, sinus pressure and a sore throat. Pertinent negatives include no chills, ear pain, neck pain, shortness of breath, sneezing or swollen glands. Past treatments include acetaminophen. The treatment provided mild relief.   Cough  This is a new problem. The current episode started 1 to 4 weeks ago. The problem has been unchanged. The problem occurs every few minutes. The cough is Non-productive. Associated symptoms include headaches, nasal congestion, postnasal drip, a sore throat and wheezing. Pertinent negatives include no  "chest pain, chills, ear pain, myalgias or shortness of breath. He has tried OTC cough suppressant for the symptoms. The treatment provided no relief.       The following have been reviewed and updated as appropriate in this visit:   Allergies  Meds  Problems         Review of System  Review of Systems   Constitutional:  Negative for chills.   HENT:  Positive for congestion, hoarse voice, postnasal drip, sinus pressure and sore throat. Negative for ear pain and sneezing.    Respiratory:  Positive for cough and wheezing. Negative for shortness of breath.    Cardiovascular:  Negative for chest pain.   Musculoskeletal:  Negative for myalgias and neck pain.   Neurological:  Positive for headaches.       Objective     Vitals  Vitals:    05/03/24 1439   BP: 138/74   BP Location: Left upper arm   Patient Position: Sitting   Pulse: (!) 108   Resp: 18   Temp: 37.3 °C (99.2 °F)   TempSrc: Temporal   SpO2: 97%   Weight: 77.1 kg (170 lb)   Height: 1.803 m (5' 10.98\")     Body mass index is 23.72 kg/m².      Physical Exam  Vitals reviewed.   Constitutional:       General: He is not in acute distress.     Appearance: Normal appearance. He is ill-appearing. He is not toxic-appearing or diaphoretic.   HENT:      Right Ear: Tympanic membrane, ear canal and external ear normal.      Left Ear: Tympanic membrane, ear canal and external ear normal.      Nose: Mucosal edema and congestion present.      Right Sinus: Frontal sinus tenderness present.      Left Sinus: Frontal sinus tenderness present.      Mouth/Throat:      Pharynx: Pharyngeal swelling and posterior oropharyngeal erythema present. No oropharyngeal exudate or uvula swelling.   Cardiovascular:      Rate and Rhythm: Regular rhythm. Tachycardia present.      Heart sounds: No murmur heard.     No friction rub. No gallop.   Pulmonary:      Effort: Pulmonary effort is normal. No respiratory distress.      Breath sounds: Examination of the right-upper field reveals wheezing. " Examination of the left-upper field reveals wheezing. Examination of the right-lower field reveals wheezing. Examination of the left-lower field reveals wheezing. Wheezing present. No rhonchi or rales.   Musculoskeletal:      Cervical back: Neck supple. No rigidity or tenderness.   Lymphadenopathy:      Cervical: No cervical adenopathy.   Neurological:      Mental Status: He is alert and oriented to person, place, and time.         Assessment/Plan     Problem List Items Addressed This Visit       Sinobronchitis - Primary     Augmentin BID #20 with food  Prednisone taper as directed.  REST  Fluids  Report status on Monday.         Relevant Medications    amoxicillin-pot clavulanate (AUGMENTIN) 875-125 mg per tablet    predniSONE (DELTASONE) 10 mg tablet           LAURA Baum  5/3/2024

## 2024-05-03 NOTE — ASSESSMENT & PLAN NOTE
Augmentin BID #20 with food  Prednisone taper as directed.  REST  Fluids  Report status on Monday.

## 2024-05-09 ENCOUNTER — OFFICE VISIT (OUTPATIENT)
Dept: PRIMARY CARE | Facility: CLINIC | Age: 59
End: 2024-05-09
Payer: COMMERCIAL

## 2024-05-09 VITALS
DIASTOLIC BLOOD PRESSURE: 70 MMHG | HEART RATE: 96 BPM | BODY MASS INDEX: 23.52 KG/M2 | TEMPERATURE: 97.3 F | HEIGHT: 71 IN | OXYGEN SATURATION: 97 % | RESPIRATION RATE: 18 BRPM | WEIGHT: 168 LBS | SYSTOLIC BLOOD PRESSURE: 128 MMHG

## 2024-05-09 DIAGNOSIS — J32.9 SINOBRONCHITIS: Primary | ICD-10-CM

## 2024-05-09 DIAGNOSIS — J40 SINOBRONCHITIS: Primary | ICD-10-CM

## 2024-05-09 PROCEDURE — 99213 OFFICE O/P EST LOW 20 MIN: CPT | Performed by: FAMILY MEDICINE

## 2024-05-09 PROCEDURE — 3078F DIAST BP <80 MM HG: CPT | Performed by: FAMILY MEDICINE

## 2024-05-09 PROCEDURE — 3074F SYST BP LT 130 MM HG: CPT | Performed by: FAMILY MEDICINE

## 2024-05-09 PROCEDURE — 3008F BODY MASS INDEX DOCD: CPT | Performed by: FAMILY MEDICINE

## 2024-05-09 RX ORDER — ALBUTEROL SULFATE 90 UG/1
2 INHALANT RESPIRATORY (INHALATION) EVERY 6 HOURS PRN
Qty: 18 G | Refills: 1 | Status: SHIPPED | OUTPATIENT
Start: 2024-05-09 | End: 2024-06-08

## 2024-05-09 RX ORDER — METHYLPREDNISOLONE 4 MG/1
TABLET ORAL
Qty: 21 TABLET | Refills: 0 | Status: SHIPPED | OUTPATIENT
Start: 2024-05-09 | End: 2024-05-16

## 2024-05-09 RX ORDER — AZITHROMYCIN 250 MG/1
TABLET, FILM COATED ORAL
Qty: 6 TABLET | Refills: 0 | Status: SHIPPED | OUTPATIENT
Start: 2024-05-09 | End: 2024-05-14

## 2024-05-09 ASSESSMENT — ENCOUNTER SYMPTOMS
MUSCULOSKELETAL NEGATIVE: 1
WHEEZING: 1
GASTROINTESTINAL NEGATIVE: 1
HEMATOLOGIC/LYMPHATIC NEGATIVE: 1
EYES NEGATIVE: 1
SINUS PRESSURE: 1
ALLERGIC/IMMUNOLOGIC NEGATIVE: 1
SINUS PAIN: 0
CHILLS: 0
NEUROLOGICAL NEGATIVE: 1
PSYCHIATRIC NEGATIVE: 1
CARDIOVASCULAR NEGATIVE: 1
COUGH: 1
CONSTITUTIONAL NEGATIVE: 1
SORE THROAT: 0
ENDOCRINE NEGATIVE: 1
FEVER: 0

## 2024-05-09 NOTE — PROGRESS NOTES
"Subjective      Patient ID: Riki Bundy is a 59 y.o. male     HPI:  Has been dealing with acute bronchitis, follow up for this.  Feeling better, but not 100%.    The following have been reviewed and updated as appropriate in this visit:   Tobacco  Allergies  Meds  Problems  Med Hx  Surg Hx  Fam Hx       Review of Systems   Constitutional: Negative.  Negative for chills and fever.   HENT:  Positive for sinus pressure. Negative for ear discharge, ear pain, sinus pain and sore throat.    Eyes: Negative.    Respiratory:  Positive for cough and wheezing.    Cardiovascular: Negative.    Gastrointestinal: Negative.    Endocrine: Negative.    Genitourinary: Negative.    Musculoskeletal: Negative.    Skin: Negative.    Allergic/Immunologic: Negative.    Neurological: Negative.    Hematological: Negative.    Psychiatric/Behavioral: Negative.     All other systems reviewed and are negative.    Vitals:    05/09/24 1145   BP: 128/70   BP Location: Left upper arm   Patient Position: Sitting   Pulse: 96   Resp: 18   Temp: 36.3 °C (97.3 °F)   TempSrc: Temporal   SpO2: 97%   Weight: 76.2 kg (168 lb)   Height: 1.803 m (5' 10.98\")      Current Outpatient Medications   Medication Sig Dispense Refill    albuterol HFA 90 mcg/actuation inhaler Inhale 2 puffs every 6 (six) hours as needed for wheezing. 18 g 1    amLODIPine (NORVASC) 5 mg tablet TAKE 1 TABLET (5 MG TOTAL) BY MOUTH DAILY. 30 tablet 5    amoxicillin-pot clavulanate (AUGMENTIN) 875-125 mg per tablet Take 1 tablet by mouth 2 (two) times a day for 10 days. With food 20 tablet 0    amphetamine-dextroamphetamine XR (ADDERALL XR) 30 mg 24 hr capsule Take 1 capsule (30 mg total) by mouth every morning. 30 capsule 0    azithromycin (ZITHROMAX) 250 mg tablet Take 2 tablets the first day, then 1 tablet daily for 4 days. 6 tablet 0    candesartan-hydrochlorothiazid (ATACAND HCT) 32-12.5 mg per tablet       CRESTOR 10 mg tablet TAKE 1 TABLET BY MOUTH EVERY DAY 30 tablet 5    " levocetirizine (XYZAL) 5 mg tablet Take 1 tablet (5 mg total) by mouth every evening. (Patient taking differently: Take 5 mg by mouth as needed.) 90 tablet 1    methylPREDNISolone (MEDROL DOSEPACK) 4 mg tablet Follow package directions. 21 tablet 0    predniSONE (DELTASONE) 10 mg tablet 2 po BID x 3 then 1 po BID x 3 then 1 po QD x 3. Take with food. 21 tablet 0    naproxen (NAPROSYN) 500 mg tablet Take 1 tablet (500 mg total) by mouth 2 (two) times a day as needed for moderate pain. 60 tablet 2     No current facility-administered medications for this visit.      Social History     Tobacco Use    Smoking status: Never    Smokeless tobacco: Never   Substance Use Topics    Alcohol use: Yes     Alcohol/week: 28.0 standard drinks of alcohol     Types: 28 Cans of beer per week    Drug use: Never      Family History   Problem Relation Age of Onset    Lupus Biological Mother     Vasculitis Biological Mother     Kidney disease Biological Mother     Deep vein thrombosis Biological Father     Heart disease Biological Father     No Known Problems Biological Brother         Past Medical History:   Diagnosis Date    Acute allergic rhinitis     ADHD (attention deficit hyperactivity disorder), inattentive type     Displacement of lumbar intervertebral disc without myelopathy     Essential hypertension, benign     Hypercholesteremia     Panic disorder without agoraphobia         Objective     Physical Exam  Vitals reviewed.   Constitutional:       Appearance: Normal appearance. He is normal weight.   Cardiovascular:      Rate and Rhythm: Normal rate and regular rhythm.      Heart sounds: Normal heart sounds.   Pulmonary:      Effort: Pulmonary effort is normal.      Breath sounds: Wheezing present. No rhonchi or rales.   Skin:     General: Skin is warm.   Neurological:      Mental Status: He is alert and oriented to person, place, and time.   Psychiatric:         Mood and Affect: Mood normal.         Problem List Items Addressed  This Visit          Infectious/Inflammatory    Sinobronchitis - Primary     Will go ahead and give him a Z-Jez, albuterol inhaler and a Medrol Dosepak.  He will be traveling and be out of the country for several days moving forward.  He does feel better but would like for him to have these treatments available while abroad         Relevant Medications    albuterol HFA 90 mcg/actuation inhaler    azithromycin (ZITHROMAX) 250 mg tablet    methylPREDNISolone (MEDROL DOSEPACK) 4 mg tablet       Assessment/Plan

## 2024-05-10 NOTE — ASSESSMENT & PLAN NOTE
Will go ahead and give him a Z-Jez, albuterol inhaler and a Medrol Dosepak.  He will be traveling and be out of the country for several days moving forward.  He does feel better but would like for him to have these treatments available while abroad

## 2024-05-21 ENCOUNTER — OFFICE VISIT (OUTPATIENT)
Dept: PRIMARY CARE | Facility: CLINIC | Age: 59
End: 2024-05-21
Payer: COMMERCIAL

## 2024-05-21 VITALS
WEIGHT: 164.2 LBS | RESPIRATION RATE: 18 BRPM | HEIGHT: 71 IN | TEMPERATURE: 97.7 F | BODY MASS INDEX: 22.99 KG/M2 | DIASTOLIC BLOOD PRESSURE: 84 MMHG | HEART RATE: 100 BPM | SYSTOLIC BLOOD PRESSURE: 120 MMHG | OXYGEN SATURATION: 99 %

## 2024-05-21 DIAGNOSIS — E78.2 MIXED HYPERLIPIDEMIA: ICD-10-CM

## 2024-05-21 DIAGNOSIS — I10 ESSENTIAL HYPERTENSION, BENIGN: Primary | ICD-10-CM

## 2024-05-21 DIAGNOSIS — F90.0 ADHD, PREDOMINANTLY INATTENTIVE TYPE: Chronic | ICD-10-CM

## 2024-05-21 PROBLEM — J32.9 SINOBRONCHITIS: Status: RESOLVED | Noted: 2024-05-03 | Resolved: 2024-05-21

## 2024-05-21 PROBLEM — J40 SINOBRONCHITIS: Status: RESOLVED | Noted: 2024-05-03 | Resolved: 2024-05-21

## 2024-05-21 PROCEDURE — 3079F DIAST BP 80-89 MM HG: CPT | Performed by: NURSE PRACTITIONER

## 2024-05-21 PROCEDURE — 3074F SYST BP LT 130 MM HG: CPT | Performed by: NURSE PRACTITIONER

## 2024-05-21 PROCEDURE — 3008F BODY MASS INDEX DOCD: CPT | Performed by: NURSE PRACTITIONER

## 2024-05-21 PROCEDURE — 99213 OFFICE O/P EST LOW 20 MIN: CPT | Performed by: NURSE PRACTITIONER

## 2024-05-21 RX ORDER — AMLODIPINE BESYLATE 5 MG/1
5 TABLET ORAL DAILY
Qty: 30 TABLET | Refills: 5 | Status: SHIPPED | OUTPATIENT
Start: 2024-05-21 | End: 2025-01-31 | Stop reason: SDUPTHER

## 2024-05-21 RX ORDER — DEXTROAMPHETAMINE SACCHARATE, AMPHETAMINE ASPARTATE MONOHYDRATE, DEXTROAMPHETAMINE SULFATE AND AMPHETAMINE SULFATE 7.5; 7.5; 7.5; 7.5 MG/1; MG/1; MG/1; MG/1
30 CAPSULE, EXTENDED RELEASE ORAL EVERY MORNING
Qty: 30 CAPSULE | Refills: 0 | Status: SHIPPED | OUTPATIENT
Start: 2024-05-21 | End: 2024-06-24 | Stop reason: SDUPTHER

## 2024-05-21 ASSESSMENT — PATIENT HEALTH QUESTIONNAIRE - PHQ9: SUM OF ALL RESPONSES TO PHQ9 QUESTIONS 1 & 2: 0

## 2024-05-21 ASSESSMENT — ENCOUNTER SYMPTOMS
PALPITATIONS: 0
MYALGIAS: 0
HYPERTENSION: 1
ANOREXIA: 0
SHORTNESS OF BREATH: 0
FATIGUE: 0

## 2024-05-21 ASSESSMENT — PAIN SCALES - GENERAL: PAINLEVEL: 0-NO PAIN

## 2024-05-21 NOTE — PROGRESS NOTES
[facility] at [address]  [place] [address]  [phone]  Fax [phone]      Patient ID: Riki Bundy                              : 1965    Visit Date: 2024    Chief Complaint: Follow-up (Routine 6m visit )         Patient ID: Riki Bundy is a 59 y.o. male.    Patient Active Problem List   Diagnosis    Essential hypertension, benign    Acute allergic rhinitis    ADHD, predominantly inattentive type    Displacement of lumbar intervertebral disc without myelopathy    Primary osteoarthritis    Adenomatous colon polyp    FH: CAD (coronary artery disease)    Mixed hyperlipidemia         Current Outpatient Medications:     albuterol HFA 90 mcg/actuation inhaler, Inhale 2 puffs every 6 (six) hours as needed for wheezing., Disp: 18 g, Rfl: 1    amLODIPine (NORVASC) 5 mg tablet, Take 1 tablet (5 mg total) by mouth daily., Disp: 30 tablet, Rfl: 5    amphetamine-dextroamphetamine XR (ADDERALL XR) 30 mg 24 hr capsule, Take 1 capsule (30 mg total) by mouth every morning., Disp: 30 capsule, Rfl: 0    candesartan-hydrochlorothiazid (ATACAND HCT) 32-12.5 mg per tablet, , Disp: , Rfl:     CRESTOR 10 mg tablet, TAKE 1 TABLET BY MOUTH EVERY DAY, Disp: 30 tablet, Rfl: 5    levocetirizine (XYZAL) 5 mg tablet, Take 1 tablet (5 mg total) by mouth every evening. (Patient taking differently: Take 5 mg by mouth as needed.), Disp: 90 tablet, Rfl: 1    predniSONE (DELTASONE) 10 mg tablet, 2 po BID x 3 then 1 po BID x 3 then 1 po QD x 3. Take with food., Disp: 21 tablet, Rfl: 0    naproxen (NAPROSYN) 500 mg tablet, Take 1 tablet (500 mg total) by mouth 2 (two) times a day as needed for moderate pain., Disp: 60 tablet, Rfl: 2    Allergies   Allergen Reactions    Sulfa (Sulfonamide Antibiotics) Other (see comments)     Get fever to the point of fainting    Cephalosporins Rash       Social History     Tobacco Use    Smoking status: Never    Smokeless tobacco:  Never   Substance Use Topics    Alcohol use: Yes     Alcohol/week: 28.0 standard drinks of alcohol     Types: 28 Cans of beer per week    Drug use: Never       Health Maintenance   Topic Date Due    RSV (60+ years old [shared decision making] or in pregnancy during 32 through 36 weeks) (1 - 1-dose 60+ series) 02/22/2025 (Originally 2/9/2024)    COVID-19 Vaccine (3 - 2023-24 season) 02/22/2025 (Originally 9/1/2023)    Influenza Vaccine (Season Ended) 08/01/2024    Depression Screening  05/21/2025    Colorectal Cancer Screening  03/11/2031    DTaP, Tdap, and Td Vaccines (3 - Td or Tdap) 06/22/2032    Zoster Vaccine  Completed    Hepatitis C Screening  Completed    Meningococcal ACWY  Aged Out    RSV <20 months  Aged Out    HIB Vaccines  Aged Out    IPV Vaccines  Aged Out    HPV Vaccines  Aged Out    Pneumococcal  Aged Out    Hepatitis B Vaccines  Discontinued    HIV Screening  Discontinued       HPI  Routine med check    ADHD  This is a chronic problem. The current episode started more than 1 year ago. The problem occurs daily. Progression since onset: stable. Pertinent negatives include no anorexia, chest pain, fatigue or myalgias. Associated symptoms comments: No insomnia  No palpitations. Nothing aggravates the symptoms. Treatments tried: Adderall XR.   Hypertension  This is a chronic problem. The current episode started more than 1 year ago. The problem is controlled. Pertinent negatives include no chest pain, palpitations, peripheral edema or shortness of breath. There are no associated agents to hypertension. Past treatments include calcium channel blockers, diuretics and angiotensin blockers. The current treatment provides significant improvement. There are no compliance problems.    Hyperlipidemia  This is a chronic problem. The current episode started more than 1 year ago. The problem is controlled. Recent lipid tests were reviewed and are normal. Pertinent negatives include no chest pain, myalgias or  "shortness of breath. Current antihyperlipidemic treatment includes statins. The current treatment provides significant improvement of lipids. There are no compliance problems.        The following have been reviewed and updated as appropriate in this visit:   Allergies  Meds  Problems         Review of System  Review of Systems   Constitutional:  Negative for fatigue.   Respiratory:  Negative for shortness of breath.    Cardiovascular:  Negative for chest pain and palpitations.   Gastrointestinal:  Negative for anorexia.   Musculoskeletal:  Negative for myalgias.       Objective     Vitals  Vitals:    05/21/24 1347   BP: 120/84   BP Location: Left upper arm   Patient Position: Sitting   Pulse: 100   Resp: 18   Temp: 36.5 °C (97.7 °F)   TempSrc: Temporal   SpO2: 99%   Weight: 74.5 kg (164 lb 3.2 oz)   Height: 1.803 m (5' 10.98\")     Body mass index is 22.91 kg/m².    Physical Exam  Physical Exam  Vitals reviewed.   Constitutional:       General: He is not in acute distress.     Appearance: Normal appearance. He is not ill-appearing, toxic-appearing or diaphoretic.   Cardiovascular:      Rate and Rhythm: Normal rate and regular rhythm.      Heart sounds: No murmur heard.     No friction rub. No gallop.   Pulmonary:      Effort: Pulmonary effort is normal.      Breath sounds: Normal breath sounds. No wheezing, rhonchi or rales.   Musculoskeletal:      Right lower leg: No edema.      Left lower leg: No edema.   Neurological:      Mental Status: He is alert and oriented to person, place, and time.         Assessment/Plan     Problem List Items Addressed This Visit       ADHD, predominantly inattentive type (Chronic)     Stable on med  No changes  Follow up 4 months         Relevant Medications    amphetamine-dextroamphetamine XR (ADDERALL XR) 30 mg 24 hr capsule    Essential hypertension, benign - Primary     BP stable  Same meds  Get labs done  Follow up 4 months         Relevant Medications    amLODIPine (NORVASC) 5 " mg tablet    Mixed hyperlipidemia     Get labs done  Continue statin daily.                LAURA Baum  5/21/2024

## 2024-05-24 ENCOUNTER — OFFICE VISIT (OUTPATIENT)
Dept: PRIMARY CARE | Facility: CLINIC | Age: 59
End: 2024-05-24
Payer: COMMERCIAL

## 2024-05-24 VITALS
WEIGHT: 165 LBS | SYSTOLIC BLOOD PRESSURE: 128 MMHG | TEMPERATURE: 96.3 F | RESPIRATION RATE: 18 BRPM | OXYGEN SATURATION: 98 % | HEART RATE: 91 BPM | HEIGHT: 71 IN | DIASTOLIC BLOOD PRESSURE: 80 MMHG | BODY MASS INDEX: 23.1 KG/M2

## 2024-05-24 DIAGNOSIS — J32.9 SINOBRONCHITIS: Primary | ICD-10-CM

## 2024-05-24 DIAGNOSIS — J40 SINOBRONCHITIS: Primary | ICD-10-CM

## 2024-05-24 PROCEDURE — 99213 OFFICE O/P EST LOW 20 MIN: CPT | Performed by: FAMILY MEDICINE

## 2024-05-24 PROCEDURE — 3074F SYST BP LT 130 MM HG: CPT | Performed by: FAMILY MEDICINE

## 2024-05-24 PROCEDURE — 3079F DIAST BP 80-89 MM HG: CPT | Performed by: FAMILY MEDICINE

## 2024-05-24 PROCEDURE — 3008F BODY MASS INDEX DOCD: CPT | Performed by: FAMILY MEDICINE

## 2024-05-24 RX ORDER — AMOXICILLIN AND CLAVULANATE POTASSIUM 875; 125 MG/1; MG/1
1 TABLET, FILM COATED ORAL 2 TIMES DAILY
Qty: 14 TABLET | Refills: 0 | Status: SHIPPED | OUTPATIENT
Start: 2024-05-24 | End: 2024-05-31

## 2024-05-24 ASSESSMENT — ENCOUNTER SYMPTOMS
CONSTITUTIONAL NEGATIVE: 1
COUGH: 1
SORE THROAT: 1
ENDOCRINE NEGATIVE: 1
CARDIOVASCULAR NEGATIVE: 1
NEUROLOGICAL NEGATIVE: 1
GASTROINTESTINAL NEGATIVE: 1
ALLERGIC/IMMUNOLOGIC NEGATIVE: 1
PSYCHIATRIC NEGATIVE: 1
HEMATOLOGIC/LYMPHATIC NEGATIVE: 1
SINUS PRESSURE: 1
FEVER: 0
EYES NEGATIVE: 1
CHILLS: 0
MUSCULOSKELETAL NEGATIVE: 1

## 2024-05-24 NOTE — ASSESSMENT & PLAN NOTE
The patient has acute bacterial sinusitis and will be prescribed a course of antibiotics.  Take antibiotics as prescribed.  May use OTC remedies as needed, rest, and drink plenty of fluids.  May also use saline nasal flushes.  If symptoms fail to improve or worsen, please call the office.

## 2024-05-24 NOTE — PROGRESS NOTES
"Subjective      Patient ID: Riki Bundy is a 59 y.o. male     HPI:  Evaluation of sinus and ear congestion.  He was traveling and had been treated with azithromycin and did get better.  Came back about 5 days ago, and sx have been worsening.  Having cough and phlegm and PND    The following have been reviewed and updated as appropriate in this visit:   Tobacco  Allergies  Meds  Problems  Med Hx  Surg Hx  Fam Hx       Review of Systems   Constitutional: Negative.  Negative for chills and fever.   HENT:  Positive for congestion, postnasal drip, sinus pressure and sore throat (irritated throat). Negative for ear discharge and ear pain.    Eyes: Negative.    Respiratory:  Positive for cough.    Cardiovascular: Negative.    Gastrointestinal: Negative.    Endocrine: Negative.    Genitourinary: Negative.    Musculoskeletal: Negative.    Skin: Negative.    Allergic/Immunologic: Negative.    Neurological: Negative.    Hematological: Negative.    Psychiatric/Behavioral: Negative.     All other systems reviewed and are negative.    Vitals:    05/24/24 1444   BP: 128/80   BP Location: Left upper arm   Patient Position: Sitting   Pulse: 91   Resp: 18   Temp: (!) 35.7 °C (96.3 °F)   TempSrc: Temporal   SpO2: 98%   Weight: 74.8 kg (165 lb)   Height: 1.803 m (5' 10.98\")      Current Outpatient Medications   Medication Sig Dispense Refill    albuterol HFA 90 mcg/actuation inhaler Inhale 2 puffs every 6 (six) hours as needed for wheezing. 18 g 1    amLODIPine (NORVASC) 5 mg tablet Take 1 tablet (5 mg total) by mouth daily. 30 tablet 5    amoxicillin-pot clavulanate (AUGMENTIN) 875-125 mg per tablet Take 1 tablet by mouth 2 (two) times a day for 7 days. 14 tablet 0    amphetamine-dextroamphetamine XR (ADDERALL XR) 30 mg 24 hr capsule Take 1 capsule (30 mg total) by mouth every morning. 30 capsule 0    candesartan-hydrochlorothiazid (ATACAND HCT) 32-12.5 mg per tablet       CRESTOR 10 mg tablet TAKE 1 TABLET BY MOUTH EVERY DAY " 30 tablet 5    levocetirizine (XYZAL) 5 mg tablet Take 1 tablet (5 mg total) by mouth every evening. (Patient taking differently: Take 5 mg by mouth as needed.) 90 tablet 1    predniSONE (DELTASONE) 10 mg tablet 2 po BID x 3 then 1 po BID x 3 then 1 po QD x 3. Take with food. 21 tablet 0    naproxen (NAPROSYN) 500 mg tablet Take 1 tablet (500 mg total) by mouth 2 (two) times a day as needed for moderate pain. 60 tablet 2     No current facility-administered medications for this visit.      Social History     Tobacco Use    Smoking status: Never    Smokeless tobacco: Never   Substance Use Topics    Alcohol use: Yes     Alcohol/week: 28.0 standard drinks of alcohol     Types: 28 Cans of beer per week    Drug use: Never      Family History   Problem Relation Age of Onset    Lupus Biological Mother     Vasculitis Biological Mother     Kidney disease Biological Mother     Deep vein thrombosis Biological Father     Heart disease Biological Father     No Known Problems Biological Brother         Past Medical History:   Diagnosis Date    Acute allergic rhinitis     ADHD (attention deficit hyperactivity disorder), inattentive type     Displacement of lumbar intervertebral disc without myelopathy     Essential hypertension, benign     Hypercholesteremia     Panic disorder without agoraphobia         Objective     Physical Exam  Vitals reviewed.   Constitutional:       Appearance: Normal appearance. He is normal weight.   HENT:      Right Ear: Tympanic membrane normal.      Left Ear: Tympanic membrane normal.      Nose:      Comments: Paranasal and frontal sinus tenderness  Cardiovascular:      Rate and Rhythm: Normal rate and regular rhythm.      Heart sounds: Normal heart sounds.   Pulmonary:      Effort: Pulmonary effort is normal.      Breath sounds: Normal breath sounds.   Skin:     General: Skin is warm.   Neurological:      Mental Status: He is alert and oriented to person, place, and time.   Psychiatric:         Mood  and Affect: Mood normal.         Problem List Items Addressed This Visit          Infectious/Inflammatory    Sinobronchitis - Primary     The patient has acute bacterial sinusitis and will be prescribed a course of antibiotics.  Take antibiotics as prescribed.  May use OTC remedies as needed, rest, and drink plenty of fluids.  May also use saline nasal flushes.  If symptoms fail to improve or worsen, please call the office.          Relevant Medications    amoxicillin-pot clavulanate (AUGMENTIN) 875-125 mg per tablet       Assessment/Plan

## 2024-06-18 DIAGNOSIS — E78.00 HYPERCHOLESTEREMIA: ICD-10-CM

## 2024-06-19 DIAGNOSIS — E78.00 HYPERCHOLESTEREMIA: ICD-10-CM

## 2024-06-19 DIAGNOSIS — M15.0 PRIMARY OSTEOARTHRITIS INVOLVING MULTIPLE JOINTS: ICD-10-CM

## 2024-06-19 RX ORDER — ROSUVASTATIN CALCIUM 10 MG/1
TABLET, FILM COATED ORAL
Qty: 90 TABLET | Refills: 1 | Status: CANCELLED | OUTPATIENT
Start: 2024-06-19

## 2024-06-19 RX ORDER — ROSUVASTATIN CALCIUM 10 MG/1
TABLET, FILM COATED ORAL
Qty: 90 TABLET | Refills: 1 | Status: SHIPPED | OUTPATIENT
Start: 2024-06-19 | End: 2024-11-25

## 2024-06-19 RX ORDER — NAPROXEN 500 MG/1
500 TABLET ORAL 2 TIMES DAILY PRN
Qty: 60 TABLET | Refills: 2 | Status: SHIPPED | OUTPATIENT
Start: 2024-06-19 | End: 2024-12-16

## 2024-06-19 NOTE — TELEPHONE ENCOUNTER
Medicine Refill Request    Last Office Visit: 5/24/2024   Last Consult Visit: Visit date not found  Last Telemedicine Visit: 1/26/2022 Gilda Wayne MD    Next Appointment: 9/18/2024      Current Outpatient Medications:     albuterol HFA 90 mcg/actuation inhaler, Inhale 2 puffs every 6 (six) hours as needed for wheezing., Disp: 18 g, Rfl: 1    amLODIPine (NORVASC) 5 mg tablet, Take 1 tablet (5 mg total) by mouth daily., Disp: 30 tablet, Rfl: 5    amphetamine-dextroamphetamine XR (ADDERALL XR) 30 mg 24 hr capsule, Take 1 capsule (30 mg total) by mouth every morning., Disp: 30 capsule, Rfl: 0    candesartan-hydrochlorothiazid (ATACAND HCT) 32-12.5 mg per tablet, , Disp: , Rfl:     CRESTOR 10 mg tablet, TAKE 1 TABLET(10 MG) BY MOUTH EVERY DAY, Disp: 90 tablet, Rfl: 1    levocetirizine (XYZAL) 5 mg tablet, Take 1 tablet (5 mg total) by mouth every evening. (Patient taking differently: Take 5 mg by mouth as needed.), Disp: 90 tablet, Rfl: 1    naproxen (NAPROSYN) 500 mg tablet, Take 1 tablet (500 mg total) by mouth 2 (two) times a day as needed for moderate pain., Disp: 60 tablet, Rfl: 2    predniSONE (DELTASONE) 10 mg tablet, 2 po BID x 3 then 1 po BID x 3 then 1 po QD x 3. Take with food., Disp: 21 tablet, Rfl: 0      BP Readings from Last 3 Encounters:   05/24/24 128/80   05/21/24 120/84   05/09/24 128/70       Recent Lab results:  Lab Results   Component Value Date    CHOL 240 (H) 05/03/2022   ,   Lab Results   Component Value Date    HDL 30 (L) 05/03/2022   ,   Lab Results   Component Value Date    LDLCALC SEE COMMENT 05/03/2022   ,   Lab Results   Component Value Date    TRIG 1,102 (H) 05/03/2022        Lab Results   Component Value Date    GLUCOSE 121 (H) 05/03/2022    GLUCOSE NEGATIVE 05/03/2022   ,   Lab Results   Component Value Date    HGBA1C 5.5 05/03/2022         Lab Results   Component Value Date    CREATININE 0.88 05/03/2022       Lab Results   Component Value Date    TSH 4.41 05/03/2022           Lab  Results   Component Value Date    HGBA1C 5.5 05/03/2022

## 2024-06-19 NOTE — TELEPHONE ENCOUNTER
Medicine Refill Request    Last Office Visit: 5/24/2024   Last Consult Visit: Visit date not found  Last Telemedicine Visit: 1/26/2022 Gilda Wayne MD    Next Appointment: 9/18/2024      Current Outpatient Medications:     albuterol HFA 90 mcg/actuation inhaler, Inhale 2 puffs every 6 (six) hours as needed for wheezing., Disp: 18 g, Rfl: 1    amLODIPine (NORVASC) 5 mg tablet, Take 1 tablet (5 mg total) by mouth daily., Disp: 30 tablet, Rfl: 5    amphetamine-dextroamphetamine XR (ADDERALL XR) 30 mg 24 hr capsule, Take 1 capsule (30 mg total) by mouth every morning., Disp: 30 capsule, Rfl: 0    candesartan-hydrochlorothiazid (ATACAND HCT) 32-12.5 mg per tablet, , Disp: , Rfl:     CRESTOR 10 mg tablet, TAKE 1 TABLET BY MOUTH EVERY DAY, Disp: 30 tablet, Rfl: 5    levocetirizine (XYZAL) 5 mg tablet, Take 1 tablet (5 mg total) by mouth every evening. (Patient taking differently: Take 5 mg by mouth as needed.), Disp: 90 tablet, Rfl: 1    naproxen (NAPROSYN) 500 mg tablet, Take 1 tablet (500 mg total) by mouth 2 (two) times a day as needed for moderate pain., Disp: 60 tablet, Rfl: 2    predniSONE (DELTASONE) 10 mg tablet, 2 po BID x 3 then 1 po BID x 3 then 1 po QD x 3. Take with food., Disp: 21 tablet, Rfl: 0      BP Readings from Last 3 Encounters:   05/24/24 128/80   05/21/24 120/84   05/09/24 128/70       Recent Lab results:  Lab Results   Component Value Date    CHOL 240 (H) 05/03/2022   ,   Lab Results   Component Value Date    HDL 30 (L) 05/03/2022   ,   Lab Results   Component Value Date    LDLCALC SEE COMMENT 05/03/2022   ,   Lab Results   Component Value Date    TRIG 1,102 (H) 05/03/2022        Lab Results   Component Value Date    GLUCOSE 121 (H) 05/03/2022    GLUCOSE NEGATIVE 05/03/2022   ,   Lab Results   Component Value Date    HGBA1C 5.5 05/03/2022         Lab Results   Component Value Date    CREATININE 0.88 05/03/2022       Lab Results   Component Value Date    TSH 4.41 05/03/2022           Lab Results    Component Value Date    HGBA1C 5.5 05/03/2022

## 2024-08-28 DIAGNOSIS — F90.0 ADHD, PREDOMINANTLY INATTENTIVE TYPE: Chronic | ICD-10-CM

## 2024-08-29 RX ORDER — DEXTROAMPHETAMINE SACCHARATE, AMPHETAMINE ASPARTATE MONOHYDRATE, DEXTROAMPHETAMINE SULFATE AND AMPHETAMINE SULFATE 7.5; 7.5; 7.5; 7.5 MG/1; MG/1; MG/1; MG/1
30 CAPSULE, EXTENDED RELEASE ORAL EVERY MORNING
Qty: 30 CAPSULE | Refills: 0 | Status: SHIPPED | OUTPATIENT
Start: 2024-08-29 | End: 2024-10-21 | Stop reason: SDUPTHER

## 2024-09-25 ENCOUNTER — OFFICE VISIT (OUTPATIENT)
Dept: PRIMARY CARE | Facility: CLINIC | Age: 59
End: 2024-09-25
Payer: COMMERCIAL

## 2024-09-25 VITALS
WEIGHT: 165.2 LBS | DIASTOLIC BLOOD PRESSURE: 88 MMHG | SYSTOLIC BLOOD PRESSURE: 132 MMHG | HEART RATE: 93 BPM | OXYGEN SATURATION: 99 % | HEIGHT: 71 IN | RESPIRATION RATE: 18 BRPM | BODY MASS INDEX: 23.13 KG/M2 | TEMPERATURE: 97.9 F

## 2024-09-25 DIAGNOSIS — I10 ESSENTIAL HYPERTENSION, BENIGN: ICD-10-CM

## 2024-09-25 DIAGNOSIS — Z12.5 PROSTATE CANCER SCREENING: ICD-10-CM

## 2024-09-25 DIAGNOSIS — E78.2 MIXED HYPERLIPIDEMIA: ICD-10-CM

## 2024-09-25 DIAGNOSIS — Z23 NEED FOR IMMUNIZATION AGAINST INFLUENZA: ICD-10-CM

## 2024-09-25 DIAGNOSIS — F90.0 ADHD, PREDOMINANTLY INATTENTIVE TYPE: Primary | Chronic | ICD-10-CM

## 2024-09-25 PROBLEM — J32.9 SINOBRONCHITIS: Status: RESOLVED | Noted: 2024-05-03 | Resolved: 2024-09-25

## 2024-09-25 PROBLEM — J40 SINOBRONCHITIS: Status: RESOLVED | Noted: 2024-05-03 | Resolved: 2024-09-25

## 2024-09-25 PROCEDURE — 3079F DIAST BP 80-89 MM HG: CPT | Performed by: NURSE PRACTITIONER

## 2024-09-25 PROCEDURE — 90471 IMMUNIZATION ADMIN: CPT | Performed by: NURSE PRACTITIONER

## 2024-09-25 PROCEDURE — 99214 OFFICE O/P EST MOD 30 MIN: CPT | Mod: 25 | Performed by: NURSE PRACTITIONER

## 2024-09-25 PROCEDURE — 90656 IIV3 VACC NO PRSV 0.5 ML IM: CPT | Performed by: NURSE PRACTITIONER

## 2024-09-25 PROCEDURE — 3008F BODY MASS INDEX DOCD: CPT | Performed by: NURSE PRACTITIONER

## 2024-09-25 PROCEDURE — 3075F SYST BP GE 130 - 139MM HG: CPT | Performed by: NURSE PRACTITIONER

## 2024-09-25 ASSESSMENT — ENCOUNTER SYMPTOMS
HEADACHES: 0
ANOREXIA: 0
PALPITATIONS: 0
FATIGUE: 0
HYPERTENSION: 1
SHORTNESS OF BREATH: 0
MYALGIAS: 0

## 2024-09-25 ASSESSMENT — PATIENT HEALTH QUESTIONNAIRE - PHQ9: SUM OF ALL RESPONSES TO PHQ9 QUESTIONS 1 & 2: 0

## 2024-09-25 ASSESSMENT — PAIN SCALES - GENERAL: PAINLEVEL_OUTOF10: 0-NO PAIN

## 2024-09-25 NOTE — PROGRESS NOTES
[facility] at [address]  [place] [address]  [phone]  Fax [phone]      Patient ID: Riki Bundy                              : 1965    Visit Date: 2024    Chief Complaint: Med Management         Patient ID: Riki Bundy is a 59 y.o. male.    Patient Active Problem List   Diagnosis    Essential hypertension, benign    Acute allergic rhinitis    ADHD, predominantly inattentive type    Displacement of lumbar intervertebral disc without myelopathy    Primary osteoarthritis    Adenomatous colon polyp    FH: CAD (coronary artery disease)    Mixed hyperlipidemia         Current Outpatient Medications:     amLODIPine (NORVASC) 5 mg tablet, Take 1 tablet (5 mg total) by mouth daily., Disp: 30 tablet, Rfl: 5    amphetamine-dextroamphetamine XR (ADDERALL XR) 30 mg 24 hr capsule, Take 1 capsule (30 mg total) by mouth every morning., Disp: 30 capsule, Rfl: 0    candesartan-hydrochlorothiazid (ATACAND HCT) 32-12.5 mg per tablet, , Disp: , Rfl:     CRESTOR 10 mg tablet, TAKE 1 TABLET(10 MG) BY MOUTH EVERY DAY, Disp: 90 tablet, Rfl: 1    levocetirizine (XYZAL) 5 mg tablet, Take 1 tablet (5 mg total) by mouth every evening. (Patient taking differently: Take 5 mg by mouth as needed.), Disp: 90 tablet, Rfl: 1    naproxen (NAPROSYN) 500 mg tablet, Take 1 tablet (500 mg total) by mouth 2 (two) times a day as needed for moderate pain., Disp: 60 tablet, Rfl: 2    albuterol HFA 90 mcg/actuation inhaler, Inhale 2 puffs every 6 (six) hours as needed for wheezing., Disp: 18 g, Rfl: 1    Allergies   Allergen Reactions    Sulfa (Sulfonamide Antibiotics) Other (see comments)     Get fever to the point of fainting    Cephalosporins Rash       Social History     Tobacco Use    Smoking status: Never    Smokeless tobacco: Never   Substance Use Topics    Alcohol use: Yes     Alcohol/week: 28.0 standard drinks of alcohol     Types: 28 Cans of beer per week     Drug use: Never       Health Maintenance   Topic Date Due    COVID-19 Vaccine (3 - 2023-24 season) 09/01/2024    RSV(60+ yo [shared decision] or 32-36 wks pregnant) (1 - 1-dose 60+ series) 02/22/2025 (Originally 2/9/2024)    Depression Screening  09/25/2025    Colorectal Cancer Screening  03/11/2031    DTaP, Tdap, and Td Vaccines (3 - Td or Tdap) 06/22/2032    Zoster Vaccine  Completed    Influenza Vaccine  Completed    Hepatitis C Screening  Completed    Meningococcal ACWY  Aged Out    RSV <20 months  Aged Out    HIB Vaccines  Aged Out    IPV Vaccines  Aged Out    HPV Vaccines  Aged Out    Pneumococcal  Aged Out    Hepatitis B Vaccines  Discontinued    HIV Screening  Discontinued       HPI  Routine med check    ADHD  This is a chronic problem. The current episode started more than 1 year ago. The problem occurs daily. The problem has been unchanged. Pertinent negatives include no anorexia, chest pain, fatigue, headaches or myalgias. Associated symptoms comments: No palpitations  No insomnia. Treatments tried: Adderall XR. The treatment provided significant relief.   Hypertension  This is a chronic problem. The current episode started more than 1 year ago. The problem is controlled. Pertinent negatives include no chest pain, headaches, palpitations, peripheral edema or shortness of breath. Agents associated with hypertension include amphetamines. Past treatments include angiotensin blockers and diuretics. The current treatment provides significant improvement. There are no compliance problems.    Hyperlipidemia  This is a chronic problem. The current episode started more than 1 year ago. The problem is controlled. Recent lipid tests were reviewed and are normal. Pertinent negatives include no chest pain, myalgias or shortness of breath. Current antihyperlipidemic treatment includes statins. The current treatment provides significant improvement of lipids. There are no compliance problems.        The following have  "been reviewed and updated as appropriate in this visit:   Allergies  Meds  Problems         Review of System  Review of Systems   Constitutional:  Negative for fatigue.   Respiratory:  Negative for shortness of breath.    Cardiovascular:  Negative for chest pain and palpitations.   Gastrointestinal:  Negative for anorexia.   Musculoskeletal:  Negative for myalgias.   Neurological:  Negative for headaches.       Objective     Vitals  Vitals:    09/25/24 1532   BP: 132/88   BP Location: Left upper arm   Patient Position: Sitting   Pulse: 93   Resp: 18   Temp: 36.6 °C (97.9 °F)   TempSrc: Temporal   SpO2: 99%   Weight: 74.9 kg (165 lb 3.2 oz)   Height: 1.803 m (5' 10.98\")     Body mass index is 23.05 kg/m².    Physical Exam  Physical Exam  Vitals reviewed.   Constitutional:       General: He is not in acute distress.     Appearance: Normal appearance. He is not ill-appearing, toxic-appearing or diaphoretic.   Cardiovascular:      Rate and Rhythm: Normal rate and regular rhythm.      Heart sounds: No murmur heard.     No friction rub. No gallop.   Pulmonary:      Effort: Pulmonary effort is normal.      Breath sounds: Normal breath sounds. No wheezing, rhonchi or rales.   Musculoskeletal:      Right lower leg: No edema.      Left lower leg: No edema.   Neurological:      Mental Status: He is alert and oriented to person, place, and time.   Psychiatric:         Mood and Affect: Mood and affect normal.         Speech: Speech normal.         Behavior: Behavior normal.         Assessment/Plan     Problem List Items Addressed This Visit       ADHD, predominantly inattentive type - Primary (Chronic)     Stable on Adderall  No changes  Follow every 4 months         Essential hypertension, benign     BP top NL  Monitor  Same meds  Follow up  4months  Labs ordered.         Relevant Orders    CBC and Differential    Comprehensive metabolic panel    Urinalysis with Reflex Culture (ED and Outpatient only)    Mixed hyperlipidemia "     Stable on statin  Labs ordered.         Relevant Orders    Comprehensive metabolic panel    Lipid panel     Other Visit Diagnoses       Prostate cancer screening        Relevant Orders    PSA    Need for immunization against influenza        Relevant Orders    Influenza vaccine trivalent preservative free 6 mons and older IM (FluLaval) (Completed)                LAURA Baum  9/25/2024

## 2024-10-28 NOTE — TELEPHONE ENCOUNTER
Try to do prior auth.  Let them know he does not do well with generic PAST MEDICAL HISTORY:  Anemia

## 2024-11-25 DIAGNOSIS — I10 ESSENTIAL HYPERTENSION, BENIGN: ICD-10-CM

## 2024-11-25 DIAGNOSIS — E78.00 HYPERCHOLESTEREMIA: ICD-10-CM

## 2024-11-25 RX ORDER — CANDESARTAN 32 MG/1
TABLET ORAL
Qty: 90 TABLET | Refills: 1 | Status: SHIPPED | OUTPATIENT
Start: 2024-11-25 | End: 2025-01-31 | Stop reason: SDUPTHER

## 2024-11-25 RX ORDER — ROSUVASTATIN CALCIUM 10 MG/1
10 TABLET, FILM COATED ORAL DAILY
Qty: 90 TABLET | Refills: 1 | Status: SHIPPED | OUTPATIENT
Start: 2024-11-25 | End: 2025-01-31 | Stop reason: SDUPTHER

## 2024-11-25 RX ORDER — HYDROCHLOROTHIAZIDE 12.5 MG/1
TABLET ORAL
Qty: 90 TABLET | Refills: 1 | Status: SHIPPED | OUTPATIENT
Start: 2024-11-25 | End: 2025-01-31 | Stop reason: SDUPTHER

## 2024-11-25 NOTE — TELEPHONE ENCOUNTER
Medicine Refill Request    Last Office Visit: 9/25/2024   Last Consult Visit: Visit date not found  Last Telemedicine Visit: 1/26/2022 Gilda Wayne MD    Next Appointment: Visit date not found      Current Outpatient Medications:     albuterol HFA 90 mcg/actuation inhaler, Inhale 2 puffs every 6 (six) hours as needed for wheezing., Disp: 18 g, Rfl: 1    amLODIPine (NORVASC) 5 mg tablet, Take 1 tablet (5 mg total) by mouth daily., Disp: 30 tablet, Rfl: 5    amphetamine-dextroamphetamine XR (ADDERALL XR) 30 mg 24 hr capsule, Take 1 capsule (30 mg total) by mouth every morning., Disp: 30 capsule, Rfl: 0    candesartan-hydrochlorothiazid (ATACAND HCT) 32-12.5 mg per tablet, , Disp: , Rfl:     CRESTOR 10 mg tablet, TAKE 1 TABLET(10 MG) BY MOUTH EVERY DAY, Disp: 90 tablet, Rfl: 1    levocetirizine (XYZAL) 5 mg tablet, Take 1 tablet (5 mg total) by mouth every evening. (Patient taking differently: Take 5 mg by mouth as needed.), Disp: 90 tablet, Rfl: 1    naproxen (NAPROSYN) 500 mg tablet, Take 1 tablet (500 mg total) by mouth 2 (two) times a day as needed for moderate pain., Disp: 60 tablet, Rfl: 2      BP Readings from Last 3 Encounters:   09/25/24 132/88   05/24/24 128/80   05/21/24 120/84       Recent Lab results:  Lab Results   Component Value Date    CHOL 240 (H) 05/03/2022   ,   Lab Results   Component Value Date    HDL 30 (L) 05/03/2022   ,   Lab Results   Component Value Date    LDLCALC SEE COMMENT 05/03/2022   ,   Lab Results   Component Value Date    TRIG 1,102 (H) 05/03/2022        Lab Results   Component Value Date    GLUCOSE 121 (H) 05/03/2022    GLUCOSE NEGATIVE 05/03/2022   ,   Lab Results   Component Value Date    HGBA1C 5.5 05/03/2022         Lab Results   Component Value Date    CREATININE 0.88 05/03/2022       Lab Results   Component Value Date    TSH 4.41 05/03/2022           Lab Results   Component Value Date    HGBA1C 5.5 05/03/2022

## 2024-11-27 ENCOUNTER — HOSPITAL ENCOUNTER (OUTPATIENT)
Facility: CLINIC | Age: 59
Discharge: HOME | End: 2024-11-27
Attending: FAMILY MEDICINE
Payer: COMMERCIAL

## 2024-11-27 VITALS
DIASTOLIC BLOOD PRESSURE: 84 MMHG | HEART RATE: 89 BPM | OXYGEN SATURATION: 96 % | SYSTOLIC BLOOD PRESSURE: 136 MMHG | TEMPERATURE: 98.3 F

## 2024-11-27 DIAGNOSIS — J01.91 ACUTE RECURRENT SINUSITIS, UNSPECIFIED LOCATION: Primary | ICD-10-CM

## 2024-11-27 PROCEDURE — 99213 OFFICE O/P EST LOW 20 MIN: CPT | Performed by: NURSE PRACTITIONER

## 2024-11-27 PROCEDURE — S9083 URGENT CARE CENTER GLOBAL: HCPCS | Performed by: NURSE PRACTITIONER

## 2024-11-27 RX ORDER — AMOXICILLIN AND CLAVULANATE POTASSIUM 875; 125 MG/1; MG/1
1 TABLET, FILM COATED ORAL
Qty: 14 TABLET | Refills: 0 | Status: SHIPPED | OUTPATIENT
Start: 2024-11-27 | End: 2024-12-04

## 2024-11-27 RX ORDER — AMOXICILLIN AND CLAVULANATE POTASSIUM 875; 125 MG/1; MG/1
1 TABLET, FILM COATED ORAL
Qty: 14 TABLET | Refills: 0 | Status: SHIPPED | OUTPATIENT
Start: 2024-11-27 | End: 2024-11-27

## 2024-11-27 ASSESSMENT — ENCOUNTER SYMPTOMS
DYSURIA: 0
BACK PAIN: 0
VOMITING: 0
COLOR CHANGE: 0
SHORTNESS OF BREATH: 0
FEVER: 0
COUGH: 0
SEIZURES: 0
PALPITATIONS: 0
ABDOMINAL PAIN: 0
HEMATURIA: 0
CHILLS: 0
SORE THROAT: 1
EYE PAIN: 0
ARTHRALGIAS: 0

## 2024-11-28 NOTE — DISCHARGE INSTRUCTIONS
You can try Flonase steroid nasal spray 2 sprays in each nostril and plain Mucinex with plenty of fluids.  He should also try using daily oral antihistamines such as Allegra Claritin or Zyrtec prior to exposure to allergens.

## 2024-11-28 NOTE — ED PROVIDER NOTES
Emergency Medicine Note  HPI   HISTORY OF PRESENT ILLNESS     Sore throat, congestion, PND x 3 days.   No fevers, cough or shortness of breath.   Tried Naproxen with some mild relief.   PMH recurrent sinus infections, multiple times a year.             Patient History   PAST HISTORY     Reviewed from Nursing Triage:       Past Medical History:   Diagnosis Date    Acute allergic rhinitis     ADHD (attention deficit hyperactivity disorder), inattentive type     Displacement of lumbar intervertebral disc without myelopathy     Essential hypertension, benign     Hypercholesteremia     Panic disorder without agoraphobia        Past Surgical History   Procedure Laterality Date    Meniscectomy  1982       Family History   Problem Relation Name Age of Onset    Lupus Biological Mother      Vasculitis Biological Mother      Kidney disease Biological Mother      Deep vein thrombosis Biological Father      Heart disease Biological Father      No Known Problems Biological Brother         Social History     Tobacco Use    Smoking status: Never    Smokeless tobacco: Never   Substance Use Topics    Alcohol use: Yes     Alcohol/week: 28.0 standard drinks of alcohol     Types: 28 Cans of beer per week    Drug use: Never         Review of Systems   REVIEW OF SYSTEMS     Review of Systems   Constitutional:  Negative for chills and fever.   HENT:  Positive for congestion, postnasal drip and sore throat. Negative for ear pain.    Eyes:  Negative for pain and visual disturbance.   Respiratory:  Negative for cough and shortness of breath.    Cardiovascular:  Negative for chest pain and palpitations.   Gastrointestinal:  Negative for abdominal pain and vomiting.   Genitourinary:  Negative for dysuria and hematuria.   Musculoskeletal:  Negative for arthralgias and back pain.   Skin:  Negative for color change and rash.   Neurological:  Negative for seizures and syncope.   All other systems reviewed and are negative.        VITALS     ED  Vitals      Date/Time Temp Pulse Resp BP SpO2 [facility]   11/27/24 1958 36.8 °C (98.3 °F) 89 -- 136/84 96 % ALB                         Physical Exam   PHYSICAL EXAM     Physical Exam  Vitals and nursing note reviewed.   Constitutional:       Appearance: He is well-developed.   HENT:      Head: Normocephalic and atraumatic.      Mouth/Throat:      Mouth: Mucous membranes are moist. No oral lesions.      Pharynx: Oropharynx is clear. Uvula midline. Posterior oropharyngeal erythema present. No pharyngeal swelling, oropharyngeal exudate or uvula swelling.      Tonsils: No tonsillar exudate or tonsillar abscesses. 0 on the right. 0 on the left.   Eyes:      Conjunctiva/sclera: Conjunctivae normal.   Cardiovascular:      Rate and Rhythm: Normal rate and regular rhythm.      Heart sounds: Normal heart sounds. No murmur heard.  Pulmonary:      Effort: Pulmonary effort is normal. No respiratory distress.      Breath sounds: Normal breath sounds. No stridor. No wheezing, rhonchi or rales.   Abdominal:      Palpations: Abdomen is soft.      Tenderness: There is no abdominal tenderness.   Musculoskeletal:      Cervical back: Neck supple.   Skin:     General: Skin is warm and dry.   Neurological:      Mental Status: He is alert.           PROCEDURES     Procedures     DATA     Results       None                No orders to display       Scoring tools                                  ED Course & MDM   MDM / ED COURSE / CLINICAL IMPRESSION / DISPO     Medical Decision Making  Discussed over-the-counter supportive care.  Sent prescription for Augmentin to requested pharmacy.  Recommended follow-up with ENT.  Instructed to follow up with PCP or ED for new or worsening symptoms.               Clinical Impression      None                 Maryam Aguilera CRNP  11/27/24 2025

## 2024-12-04 NOTE — ED ATTESTATION NOTE
Patient was seen by the NP.  I was available for consultation via telephone.     Janet Back DO  12/04/24 0856

## 2025-01-31 ENCOUNTER — OFFICE VISIT (OUTPATIENT)
Dept: PRIMARY CARE | Facility: CLINIC | Age: 60
End: 2025-01-31
Payer: COMMERCIAL

## 2025-01-31 VITALS
SYSTOLIC BLOOD PRESSURE: 138 MMHG | TEMPERATURE: 97.8 F | OXYGEN SATURATION: 98 % | WEIGHT: 168 LBS | HEART RATE: 78 BPM | RESPIRATION RATE: 17 BRPM | HEIGHT: 71 IN | BODY MASS INDEX: 23.52 KG/M2 | DIASTOLIC BLOOD PRESSURE: 80 MMHG

## 2025-01-31 DIAGNOSIS — E78.00 HYPERCHOLESTEREMIA: ICD-10-CM

## 2025-01-31 DIAGNOSIS — E78.2 MIXED HYPERLIPIDEMIA: ICD-10-CM

## 2025-01-31 DIAGNOSIS — I10 ESSENTIAL HYPERTENSION, BENIGN: Primary | ICD-10-CM

## 2025-01-31 DIAGNOSIS — F90.0 ADHD, PREDOMINANTLY INATTENTIVE TYPE: Chronic | ICD-10-CM

## 2025-01-31 PROCEDURE — 3075F SYST BP GE 130 - 139MM HG: CPT | Performed by: NURSE PRACTITIONER

## 2025-01-31 PROCEDURE — 3008F BODY MASS INDEX DOCD: CPT | Performed by: NURSE PRACTITIONER

## 2025-01-31 PROCEDURE — 3079F DIAST BP 80-89 MM HG: CPT | Performed by: NURSE PRACTITIONER

## 2025-01-31 PROCEDURE — 99213 OFFICE O/P EST LOW 20 MIN: CPT | Performed by: NURSE PRACTITIONER

## 2025-01-31 RX ORDER — AMLODIPINE BESYLATE 5 MG/1
5 TABLET ORAL DAILY
Qty: 30 TABLET | Refills: 5 | Status: SHIPPED | OUTPATIENT
Start: 2025-01-31 | End: 2025-07-30

## 2025-01-31 RX ORDER — DEXTROAMPHETAMINE SACCHARATE, AMPHETAMINE ASPARTATE MONOHYDRATE, DEXTROAMPHETAMINE SULFATE AND AMPHETAMINE SULFATE 7.5; 7.5; 7.5; 7.5 MG/1; MG/1; MG/1; MG/1
30 CAPSULE, EXTENDED RELEASE ORAL EVERY MORNING
Qty: 30 CAPSULE | Refills: 0 | Status: SHIPPED | OUTPATIENT
Start: 2025-01-31 | End: 2025-02-03 | Stop reason: SDUPTHER

## 2025-01-31 RX ORDER — HYDROCHLOROTHIAZIDE 12.5 MG/1
12.5 TABLET ORAL DAILY
Qty: 90 TABLET | Refills: 1 | Status: SHIPPED | OUTPATIENT
Start: 2025-01-31 | End: 2025-07-30

## 2025-01-31 RX ORDER — CANDESARTAN 32 MG/1
32 TABLET ORAL DAILY
Qty: 90 TABLET | Refills: 1 | Status: SHIPPED | OUTPATIENT
Start: 2025-01-31

## 2025-01-31 RX ORDER — ROSUVASTATIN CALCIUM 10 MG/1
10 TABLET, FILM COATED ORAL DAILY
Qty: 90 TABLET | Refills: 1 | Status: SHIPPED | OUTPATIENT
Start: 2025-01-31

## 2025-01-31 ASSESSMENT — ENCOUNTER SYMPTOMS
SHORTNESS OF BREATH: 0
PALPITATIONS: 0
MYALGIAS: 0
FATIGUE: 0
HYPERTENSION: 1
ANOREXIA: 0

## 2025-01-31 ASSESSMENT — PAIN SCALES - GENERAL: PAINLEVEL_OUTOF10: 0-NO PAIN

## 2025-01-31 NOTE — PROGRESS NOTES
[facility] at [address]  [place] [address]  [phone]  Fax [phone]      Patient ID: Riki Bundy                              : 1965    Visit Date: 2025    Chief Complaint: Med Management         Patient ID: Riki Bundy is a 59 y.o. male.    Patient Active Problem List   Diagnosis    Essential hypertension, benign    Acute allergic rhinitis    ADHD, predominantly inattentive type    Displacement of lumbar intervertebral disc without myelopathy    Primary osteoarthritis    Adenomatous colon polyp    FH: CAD (coronary artery disease)    Mixed hyperlipidemia         Current Outpatient Medications:     amLODIPine (NORVASC) 5 mg tablet, Take 1 tablet (5 mg total) by mouth daily., Disp: 30 tablet, Rfl: 5    amphetamine-dextroamphetamine XR (ADDERALL XR) 30 mg 24 hr capsule, Take 1 capsule (30 mg total) by mouth every morning., Disp: 30 capsule, Rfl: 0    candesartan (ATACAND) 32 mg tablet, Take 1 tablet (32 mg total) by mouth daily., Disp: 90 tablet, Rfl: 1    CRESTOR 10 mg tablet, Take 1 tablet (10 mg total) by mouth daily., Disp: 90 tablet, Rfl: 1    hydrochlorothiazide 12.5 mg tablet, Take 1 tablet (12.5 mg total) by mouth daily., Disp: 90 tablet, Rfl: 1    albuterol HFA 90 mcg/actuation inhaler, Inhale 2 puffs every 6 (six) hours as needed for wheezing., Disp: 18 g, Rfl: 1    levocetirizine (XYZAL) 5 mg tablet, Take 1 tablet (5 mg total) by mouth every evening. (Patient taking differently: Take 5 mg by mouth as needed.), Disp: 90 tablet, Rfl: 1    naproxen (NAPROSYN) 500 mg tablet, Take 1 tablet (500 mg total) by mouth 2 (two) times a day as needed for moderate pain., Disp: 60 tablet, Rfl: 2    Allergies   Allergen Reactions    Sulfa (Sulfonamide Antibiotics) Other (see comments)     Get fever to the point of fainting    Cephalosporins Rash       Social History     Tobacco Use    Smoking status: Never    Smokeless tobacco:  Never   Substance Use Topics    Alcohol use: Yes     Alcohol/week: 28.0 standard drinks of alcohol     Types: 28 Cans of beer per week    Drug use: Never       Health Maintenance   Topic Date Due    COVID-19 Vaccine (3 - 2024-25 season) 01/31/2026 (Originally 9/1/2024)    Depression Screening  09/25/2025    Colorectal Cancer Screening  03/11/2031    DTaP, Tdap, and Td Vaccines (3 - Td or Tdap) 06/22/2032    RSV Vaccine (1 - 1-dose 75+ series) 02/09/2039    Zoster Vaccine  Completed    Influenza Vaccine  Completed    Hepatitis C Screening  Completed    Meningococcal ACWY  Aged Out    RSV <20 months  Aged Out    HIB Vaccines  Aged Out    IPV Vaccines  Aged Out    Meningococcal B  Aged Out    HPV Vaccines  Aged Out    Pneumococcal  Aged Out    Hepatitis B Vaccines  Discontinued    HIV Screening  Discontinued       HPI  Med check  Doing well  Has not done labs yet.    ADHD  This is a chronic problem. The current episode started more than 1 year ago. The problem occurs daily. Progression since onset: stable. Pertinent negatives include no anorexia, chest pain, fatigue or myalgias. Associated symptoms comments: No palpitations  No insomnia. Treatments tried: Adderall XR.   Hypertension  This is a chronic problem. The current episode started more than 1 year ago. The problem is controlled. Pertinent negatives include no chest pain, palpitations, peripheral edema or shortness of breath. There are no associated agents to hypertension. Past treatments include angiotensin blockers, calcium channel blockers and diuretics. The current treatment provides significant improvement. There are no compliance problems.  There is no history of chronic renal disease.   Hyperlipidemia  This is a chronic problem. The current episode started more than 1 year ago. The problem is controlled. Recent lipid tests were reviewed and are normal. He has no history of chronic renal disease, diabetes, hypothyroidism, liver disease, obesity or nephrotic  "syndrome. Pertinent negatives include no chest pain, myalgias or shortness of breath. Current antihyperlipidemic treatment includes statins. The current treatment provides significant improvement of lipids. There are no compliance problems.        The following have been reviewed and updated as appropriate in this visit:   Allergies  Meds  Problems         Review of System  Review of Systems   Constitutional:  Negative for fatigue.   Respiratory:  Negative for shortness of breath.    Cardiovascular:  Negative for chest pain and palpitations.   Gastrointestinal:  Negative for anorexia.   Musculoskeletal:  Negative for myalgias.       Objective     Vitals  Vitals:    01/31/25 1426   BP: 138/80   BP Location: Right upper arm   Patient Position: Sitting   Pulse: 78   Resp: 17   Temp: 36.6 °C (97.8 °F)   TempSrc: Temporal   SpO2: 98%   Weight: 76.2 kg (168 lb)   Height: 1.803 m (5' 10.98\")     Body mass index is 23.44 kg/m².    Physical Exam  Physical Exam  Vitals reviewed.   Constitutional:       General: He is not in acute distress.     Appearance: Normal appearance. He is not ill-appearing, toxic-appearing or diaphoretic.   Cardiovascular:      Rate and Rhythm: Normal rate and regular rhythm.      Heart sounds: No murmur heard.     No friction rub. No gallop.   Pulmonary:      Effort: Pulmonary effort is normal.      Breath sounds: Normal breath sounds. No wheezing, rhonchi or rales.   Musculoskeletal:      Right lower leg: No edema.      Left lower leg: No edema.   Neurological:      Mental Status: He is alert and oriented to person, place, and time.   Psychiatric:         Mood and Affect: Mood and affect normal.         Speech: Speech normal.         Behavior: Behavior normal.         Assessment/Plan     Problem List Items Addressed This Visit       ADHD, predominantly inattentive type (Chronic)     Stable on Adderall  Continue med  Follow up 4 months         Relevant Medications    amphetamine-dextroamphetamine " XR (ADDERALL XR) 30 mg 24 hr capsule    Essential hypertension, benign - Primary     BP up at first but came down with just sitting.  Same meds  Follow up 4 months  Get labs done.         Relevant Medications    amLODIPine (NORVASC) 5 mg tablet    candesartan (ATACAND) 32 mg tablet    hydrochlorothiazide 12.5 mg tablet    Mixed hyperlipidemia     Complete labs  Continue statin.         Relevant Medications    CRESTOR 10 mg tablet     Other Visit Diagnoses       Hypercholesteremia        Relevant Medications    CRESTOR 10 mg tablet                LAURA Baum  1/31/2025

## 2025-04-08 DIAGNOSIS — E78.00 HYPERCHOLESTEREMIA: ICD-10-CM

## 2025-04-09 RX ORDER — ROSUVASTATIN CALCIUM 10 MG/1
10 TABLET, COATED ORAL DAILY
Qty: 90 TABLET | Refills: 1 | Status: SHIPPED | OUTPATIENT
Start: 2025-04-09 | End: 2025-06-04 | Stop reason: SDUPTHER

## 2025-06-04 ENCOUNTER — OFFICE VISIT (OUTPATIENT)
Dept: PRIMARY CARE | Facility: CLINIC | Age: 60
End: 2025-06-04
Payer: COMMERCIAL

## 2025-06-04 VITALS
BODY MASS INDEX: 23.41 KG/M2 | TEMPERATURE: 97.4 F | OXYGEN SATURATION: 99 % | HEIGHT: 71 IN | WEIGHT: 167.2 LBS | SYSTOLIC BLOOD PRESSURE: 124 MMHG | HEART RATE: 94 BPM | DIASTOLIC BLOOD PRESSURE: 70 MMHG

## 2025-06-04 DIAGNOSIS — F90.0 ADHD, PREDOMINANTLY INATTENTIVE TYPE: Primary | Chronic | ICD-10-CM

## 2025-06-04 DIAGNOSIS — I10 ESSENTIAL HYPERTENSION, BENIGN: ICD-10-CM

## 2025-06-04 DIAGNOSIS — E78.00 HYPERCHOLESTEREMIA: ICD-10-CM

## 2025-06-04 DIAGNOSIS — E78.2 MIXED HYPERLIPIDEMIA: ICD-10-CM

## 2025-06-04 DIAGNOSIS — Z12.5 PROSTATE CANCER SCREENING: ICD-10-CM

## 2025-06-04 PROCEDURE — 3074F SYST BP LT 130 MM HG: CPT | Performed by: NURSE PRACTITIONER

## 2025-06-04 PROCEDURE — 3008F BODY MASS INDEX DOCD: CPT | Performed by: NURSE PRACTITIONER

## 2025-06-04 PROCEDURE — 99214 OFFICE O/P EST MOD 30 MIN: CPT | Performed by: NURSE PRACTITIONER

## 2025-06-04 PROCEDURE — 3078F DIAST BP <80 MM HG: CPT | Performed by: NURSE PRACTITIONER

## 2025-06-04 RX ORDER — HYDROCHLOROTHIAZIDE 12.5 MG/1
12.5 TABLET ORAL DAILY
Qty: 90 TABLET | Refills: 1 | Status: SHIPPED | OUTPATIENT
Start: 2025-06-04 | End: 2025-12-01

## 2025-06-04 RX ORDER — ROSUVASTATIN CALCIUM 10 MG/1
10 TABLET, COATED ORAL DAILY
Qty: 90 TABLET | Refills: 1 | Status: SHIPPED | OUTPATIENT
Start: 2025-06-04

## 2025-06-04 RX ORDER — CANDESARTAN 32 MG/1
32 TABLET ORAL DAILY
Qty: 90 TABLET | Refills: 1 | Status: SHIPPED | OUTPATIENT
Start: 2025-06-04

## 2025-06-04 RX ORDER — AMLODIPINE BESYLATE 5 MG/1
5 TABLET ORAL DAILY
Qty: 30 TABLET | Refills: 5 | Status: SHIPPED | OUTPATIENT
Start: 2025-06-04 | End: 2025-12-01

## 2025-06-04 ASSESSMENT — PAIN SCALES - GENERAL: PAINLEVEL_OUTOF10: 0-NO PAIN

## 2025-06-04 ASSESSMENT — ENCOUNTER SYMPTOMS
MYALGIAS: 0
HEADACHES: 0
FATIGUE: 0
ANOREXIA: 0
HYPERTENSION: 1
SHORTNESS OF BREATH: 0
PALPITATIONS: 0

## 2025-06-04 NOTE — PROGRESS NOTES
[facility] at [address]  [place] [address]  [phone]  Fax [phone]      Patient ID: Riki Bundy                              : 1965    Visit Date: 2025    Chief Complaint: Med Management         Patient ID: Riki Bundy is a 60 y.o. male.    Patient Active Problem List   Diagnosis    Essential hypertension, benign    Acute allergic rhinitis    ADHD, predominantly inattentive type    Displacement of lumbar intervertebral disc without myelopathy    Primary osteoarthritis    Adenomatous colon polyp    FH: CAD (coronary artery disease)    Mixed hyperlipidemia         Current Outpatient Medications:     amLODIPine (NORVASC) 5 mg tablet, Take 1 tablet (5 mg total) by mouth daily., Disp: 30 tablet, Rfl: 5    amphetamine-dextroamphetamine XR (ADDERALL XR) 30 mg 24 hr capsule, Take 1 capsule (30 mg total) by mouth every morning., Disp: 30 capsule, Rfl: 0    candesartan (ATACAND) 32 mg tablet, Take 1 tablet (32 mg total) by mouth daily., Disp: 90 tablet, Rfl: 1    hydrochlorothiazide 12.5 mg tablet, Take 1 tablet (12.5 mg total) by mouth daily., Disp: 90 tablet, Rfl: 1    levocetirizine (XYZAL) 5 mg tablet, Take 1 tablet (5 mg total) by mouth every evening. (Patient taking differently: Take 5 mg by mouth as needed.), Disp: 90 tablet, Rfl: 1    rosuvastatin (CRESTOR) 10 mg tablet, Take 1 tablet (10 mg total) by mouth daily., Disp: 90 tablet, Rfl: 1    albuterol HFA 90 mcg/actuation inhaler, Inhale 2 puffs every 6 (six) hours as needed for wheezing., Disp: 18 g, Rfl: 1    naproxen (NAPROSYN) 500 mg tablet, Take 1 tablet (500 mg total) by mouth 2 (two) times a day as needed for moderate pain., Disp: 60 tablet, Rfl: 2    Allergies   Allergen Reactions    Sulfa (Sulfonamide Antibiotics) Other (see comments)     Get fever to the point of fainting    Cephalosporins Rash       Social History     Tobacco Use    Smoking status: Never    Smokeless  tobacco: Never   Substance Use Topics    Alcohol use: Yes     Alcohol/week: 28.0 standard drinks of alcohol     Types: 28 Cans of beer per week    Drug use: Never       Health Maintenance   Topic Date Due    Pneumococcal (50 years of age and older) (1 of 1 - PCV) Never done    COVID-19 Vaccine (3 - 2024-25 season) 01/31/2026 (Originally 9/1/2024)    Depression Screening  09/25/2025    Colorectal Cancer Screening  03/11/2031    DTaP, Tdap, and Td Vaccines (3 - Td or Tdap) 06/22/2032    RSV Vaccine (1 - 1-dose 75+ series) 02/09/2039    Zoster Vaccine  Completed    Influenza Vaccine  Completed    Hepatitis C Screening  Completed    Meningococcal ACWY  Aged Out    RSV <20 months  Aged Out    HIB Vaccines  Aged Out    IPV Vaccines  Aged Out    Meningococcal B  Aged Out    HPV Vaccines  Aged Out    Hepatitis B Vaccines  Discontinued    HIV Screening  Discontinued       HPI  Routine med check    ADHD  This is a chronic problem. The current episode started more than 1 year ago. Progression since onset: stable on med. Pertinent negatives include no anorexia, chest pain, fatigue, headaches or myalgias. Associated symptoms comments: No palpitations  No insomnia. Nothing aggravates the symptoms. Treatments tried: Adderall XR. The treatment provided significant relief.   Hypertension  This is a chronic problem. The current episode started more than 1 year ago. The problem is controlled. Pertinent negatives include no chest pain, headaches, malaise/fatigue, palpitations, peripheral edema or shortness of breath. Agents associated with hypertension include amphetamines. Past treatments include angiotensin blockers and diuretics. The current treatment provides significant improvement. There are no compliance problems.  There is no history of chronic renal disease.   Hyperlipidemia  This is a chronic problem. The current episode started more than 1 year ago. The problem is controlled. He has no history of chronic renal disease,  "diabetes, hypothyroidism, liver disease, obesity or nephrotic syndrome. Pertinent negatives include no chest pain, myalgias or shortness of breath. Current antihyperlipidemic treatment includes statins. The current treatment provides significant improvement of lipids. There are no compliance problems.        The following have been reviewed and updated as appropriate in this visit:   Allergies  Meds  Problems         Review of System  Review of Systems   Constitutional:  Negative for fatigue and malaise/fatigue.   Respiratory:  Negative for shortness of breath.    Cardiovascular:  Negative for chest pain and palpitations.   Gastrointestinal:  Negative for anorexia.   Musculoskeletal:  Negative for myalgias.   Neurological:  Negative for headaches.       Objective     Vitals  Vitals:    06/04/25 1339   BP: 124/70   BP Location: Left upper arm   Patient Position: Sitting   Pulse: 94   Temp: 36.3 °C (97.4 °F)   TempSrc: Temporal   SpO2: 99%   Weight: 75.8 kg (167 lb 3.2 oz)   Height: 1.803 m (5' 10.98\")     Body mass index is 23.33 kg/m².      Physical Exam  Vitals reviewed.   Constitutional:       General: He is not in acute distress.     Appearance: Normal appearance. He is not ill-appearing, toxic-appearing or diaphoretic.   Cardiovascular:      Rate and Rhythm: Normal rate and regular rhythm.      Heart sounds: No murmur heard.     No friction rub. No gallop.   Pulmonary:      Effort: Pulmonary effort is normal.      Breath sounds: Normal breath sounds. No wheezing, rhonchi or rales.   Musculoskeletal:      Right lower leg: No edema.      Left lower leg: No edema.   Neurological:      Mental Status: He is alert and oriented to person, place, and time.   Psychiatric:         Mood and Affect: Mood and affect normal.         Speech: Speech normal.         Behavior: Behavior normal.         Assessment/Plan     Problem List Items Addressed This Visit       ADHD, predominantly inattentive type - Primary (Chronic)    " Stable on Adderall XR  Updated med contract signed  Follow up 4 months         Essential hypertension, benign    BP stable  Continue meds  Follow up 4 mo  MUST get labs done ASAP         Relevant Medications    hydrochlorothiazide 12.5 mg tablet    candesartan (ATACAND) 32 mg tablet    amLODIPine (NORVASC) 5 mg tablet    Other Relevant Orders    CBC and Differential    Comprehensive metabolic panel    Urinalysis with Reflex Culture (ED and Outpatient only)    Mixed hyperlipidemia    On statin  No labs since 2022  MUST get labs done ASAP         Relevant Medications    rosuvastatin (CRESTOR) 10 mg tablet    Other Relevant Orders    Comprehensive metabolic panel    Lipid panel     Other Visit Diagnoses         Prostate cancer screening        Relevant Orders    PSA      Hypercholesteremia        Relevant Medications    rosuvastatin (CRESTOR) 10 mg tablet                LAURA Baum  6/4/2025

## 2025-06-04 NOTE — ASSESSMENT & PLAN NOTE
BP stable  Continue meds  Follow up 4 mo  MUST get labs done ASAP  
On statin  No labs since 2022  MUST get labs done ASAP  
Stable on Adderall XR  Updated med contract signed  Follow up 4 months  
Speaking Coherently
Alert and oriented to person, place and time

## 2025-06-06 ENCOUNTER — RESULTS FOLLOW-UP (OUTPATIENT)
Dept: PRIMARY CARE | Facility: CLINIC | Age: 60
End: 2025-06-06
Payer: COMMERCIAL

## 2025-06-06 LAB
ALBUMIN SERPL-MCNC: 4.7 G/DL (ref 3.6–5.1)
ALBUMIN/GLOB SERPL: 1.9 (CALC) (ref 1–2.5)
ALP SERPL-CCNC: 75 U/L (ref 35–144)
ALT SERPL-CCNC: 17 U/L (ref 9–46)
APPEARANCE UR: CLEAR
AST SERPL-CCNC: 14 U/L (ref 10–35)
BACTERIA #/AREA URNS HPF: NORMAL /HPF
BACTERIA UR CULT: NORMAL
BASOPHILS # BLD AUTO: 21 CELLS/UL (ref 0–200)
BASOPHILS NFR BLD AUTO: 0.4 %
BILIRUB SERPL-MCNC: 0.5 MG/DL (ref 0.2–1.2)
BILIRUB UR QL STRIP: NEGATIVE
BUN SERPL-MCNC: 16 MG/DL (ref 7–25)
BUN/CREAT SERPL: ABNORMAL (CALC) (ref 6–22)
CALCIUM SERPL-MCNC: 9.7 MG/DL (ref 8.6–10.3)
CHLORIDE SERPL-SCNC: 98 MMOL/L (ref 98–110)
CHOLEST SERPL-MCNC: 153 MG/DL
CHOLEST/HDLC SERPL: 2.8 (CALC)
CO2 SERPL-SCNC: 30 MMOL/L (ref 20–32)
COLOR UR: YELLOW
CREAT SERPL-MCNC: 0.91 MG/DL (ref 0.7–1.35)
EGFRCR SERPLBLD CKD-EPI 2021: 96 ML/MIN/1.73M2
EOSINOPHIL # BLD AUTO: 42 CELLS/UL (ref 15–500)
EOSINOPHIL NFR BLD AUTO: 0.8 %
ERYTHROCYTE [DISTWIDTH] IN BLOOD BY AUTOMATED COUNT: 11.9 % (ref 11–15)
GLOBULIN SER CALC-MCNC: 2.5 G/DL (CALC) (ref 1.9–3.7)
GLUCOSE SERPL-MCNC: 115 MG/DL (ref 65–99)
GLUCOSE UR QL STRIP: NEGATIVE
HBA1C MFR BLD: 5.7 %
HCT VFR BLD AUTO: 42.4 % (ref 38.5–50)
HDLC SERPL-MCNC: 54 MG/DL
HGB BLD-MCNC: 13.8 G/DL (ref 13.2–17.1)
HGB UR QL STRIP: NEGATIVE
HYALINE CASTS #/AREA URNS LPF: NORMAL /LPF
KETONES UR QL STRIP: NEGATIVE
LDLC SERPL CALC-MCNC: 85 MG/DL (CALC)
LEUKOCYTE ESTERASE UR QL STRIP: NEGATIVE
LYMPHOCYTES # BLD AUTO: 946 CELLS/UL (ref 850–3900)
LYMPHOCYTES NFR BLD AUTO: 18.2 %
MCH RBC QN AUTO: 28.6 PG (ref 27–33)
MCHC RBC AUTO-ENTMCNC: 32.5 G/DL (ref 32–36)
MCV RBC AUTO: 87.8 FL (ref 80–100)
MONOCYTES # BLD AUTO: 494 CELLS/UL (ref 200–950)
MONOCYTES NFR BLD AUTO: 9.5 %
NEUTROPHILS # BLD AUTO: 3697 CELLS/UL (ref 1500–7800)
NEUTROPHILS NFR BLD AUTO: 71.1 %
NITRITE UR QL STRIP: NEGATIVE
NONHDLC SERPL-MCNC: 99 MG/DL (CALC)
PH UR STRIP: 6 [PH] (ref 5–8)
PLATELET # BLD AUTO: 351 THOUSAND/UL (ref 140–400)
PMV BLD REES-ECKER: 9.4 FL (ref 7.5–12.5)
POTASSIUM SERPL-SCNC: 4.1 MMOL/L (ref 3.5–5.3)
PROT SERPL-MCNC: 7.2 G/DL (ref 6.1–8.1)
PROT UR QL STRIP: NEGATIVE
PSA SERPL-MCNC: 0.55 NG/ML
QUEST COMMENT: NORMAL
QUEST CONTACT:: NORMAL
QUEST REPORT ALWAYS MESSAGE DEMOGRAPHICS IN QUESTION: NORMAL
RBC # BLD AUTO: 4.83 MILLION/UL (ref 4.2–5.8)
RBC #/AREA URNS HPF: NORMAL /HPF
REF LAB TEST NAME: NORMAL
REF LAB TEST: NORMAL
SERVICE CMNT-IMP: NORMAL
SODIUM SERPL-SCNC: 138 MMOL/L (ref 135–146)
SP GR UR STRIP: 1.01 (ref 1–1.03)
SQUAMOUS #/AREA URNS HPF: NORMAL /HPF
TRIGL SERPL-MCNC: 64 MG/DL
WBC # BLD AUTO: 5.2 THOUSAND/UL (ref 3.8–10.8)
WBC #/AREA URNS HPF: NORMAL /HPF

## 2025-06-09 NOTE — TELEPHONE ENCOUNTER
----- Message from Lola Bonilla sent at 6/6/2025  4:34 PM EDT -----  Let Riki know--A1c in the prediabetic range. He needs to watch his sugar, alcohol, carbs in diet  ----- Message -----  From: [facility] Results In  Sent: 6/6/2025   6:15 AM EDT  To: LAURA Baum

## 2025-06-20 ENCOUNTER — OFFICE VISIT (OUTPATIENT)
Dept: PRIMARY CARE | Facility: CLINIC | Age: 60
End: 2025-06-20
Payer: COMMERCIAL

## 2025-06-20 VITALS
DIASTOLIC BLOOD PRESSURE: 84 MMHG | BODY MASS INDEX: 23.38 KG/M2 | OXYGEN SATURATION: 98 % | SYSTOLIC BLOOD PRESSURE: 124 MMHG | HEART RATE: 103 BPM | TEMPERATURE: 97.7 F | HEIGHT: 71 IN | WEIGHT: 167 LBS

## 2025-06-20 DIAGNOSIS — R09.81 SINUS CONGESTION: ICD-10-CM

## 2025-06-20 DIAGNOSIS — J01.00 ACUTE NON-RECURRENT MAXILLARY SINUSITIS: Primary | ICD-10-CM

## 2025-06-20 PROCEDURE — 3074F SYST BP LT 130 MM HG: CPT

## 2025-06-20 PROCEDURE — 3079F DIAST BP 80-89 MM HG: CPT

## 2025-06-20 PROCEDURE — 99214 OFFICE O/P EST MOD 30 MIN: CPT

## 2025-06-20 PROCEDURE — 3008F BODY MASS INDEX DOCD: CPT

## 2025-06-20 RX ORDER — AMOXICILLIN AND CLAVULANATE POTASSIUM 875; 125 MG/1; MG/1
1 TABLET, FILM COATED ORAL 2 TIMES DAILY
Qty: 14 TABLET | Refills: 0 | Status: SHIPPED | OUTPATIENT
Start: 2025-06-20 | End: 2025-06-27

## 2025-06-20 ASSESSMENT — ENCOUNTER SYMPTOMS
HEADACHES: 1
CHILLS: 1
SORE THROAT: 1
SINUS PRESSURE: 1
SINUS PAIN: 1
MYALGIAS: 1
COUGH: 1

## 2025-06-20 ASSESSMENT — PAIN SCALES - GENERAL: PAINLEVEL_OUTOF10: 0-NO PAIN

## 2025-06-20 NOTE — PROGRESS NOTES
Subjective      Patient ID: Riki Bundy is a 60 y.o. male.  1965    Patient Active Problem List   Diagnosis    Essential hypertension, benign    Acute allergic rhinitis    ADHD, predominantly inattentive type    Displacement of lumbar intervertebral disc without myelopathy    Primary osteoarthritis    Adenomatous colon polyp    FH: CAD (coronary artery disease)    Mixed hyperlipidemia     Past Medical History:   Diagnosis Date    Acute allergic rhinitis     ADHD (attention deficit hyperactivity disorder), inattentive type     Displacement of lumbar intervertebral disc without myelopathy     Essential hypertension, benign     Hypercholesteremia     Panic disorder without agoraphobia      Medications Ordered Prior to Encounter[1]    Allergies   Allergen Reactions    Sulfa (Sulfonamide Antibiotics) Other (see comments)     Get fever to the point of fainting    Cephalosporins Rash     HPI    Patient here with URI symptoms  Started on Tuesday- scratchy throat  Fatigue and weak on Weds  Hasn't checked temp but has had chills/sweats  Had irritated eyes    No exposure to anyone sick  No home or covid test    Sinusitis  Associated symptoms include chills, congestion (clear to yellow), coughing (occasional but more from upper airway), headaches, sinus pressure and a sore throat. Pertinent negatives include no sneezing.       The following have been reviewed and updated as appropriate in this visit:   Allergies  Meds  Problems       Review of Systems   Constitutional:  Positive for chills.   HENT:  Positive for congestion (clear to yellow), sinus pressure, sinus pain and sore throat. Negative for sneezing.    Respiratory:  Positive for cough (occasional but more from upper airway).    Musculoskeletal:  Positive for myalgias.   Neurological:  Positive for headaches.       Objective     Vitals:    06/20/25 1139   BP: 124/84   BP Location: Left upper arm   Patient Position: Sitting   Pulse: (!) 103   Temp: 36.5 °C (97.7  "°F)   TempSrc: Temporal   SpO2: 98%   Weight: 75.8 kg (167 lb)   Height: 1.803 m (5' 10.98\")     Body mass index is 23.3 kg/m².    Physical Exam  Constitutional:       General: He is not in acute distress.     Appearance: Normal appearance.   HENT:      Right Ear: Tympanic membrane and ear canal normal.      Left Ear: Tympanic membrane and ear canal normal.      Nose: Congestion present.      Right Sinus: Maxillary sinus tenderness and frontal sinus tenderness present.      Left Sinus: Maxillary sinus tenderness and frontal sinus tenderness present.      Mouth/Throat:      Mouth: Mucous membranes are moist.      Pharynx: Posterior oropharyngeal erythema present. No oropharyngeal exudate.   Cardiovascular:      Rate and Rhythm: Normal rate and regular rhythm.   Pulmonary:      Effort: Pulmonary effort is normal.      Breath sounds: Normal breath sounds.   Lymphadenopathy:      Cervical: No cervical adenopathy.   Skin:     General: Skin is warm and dry.   Neurological:      Mental Status: He is alert. Mental status is at baseline.         Assessment & Plan  Acute non-recurrent maxillary sinusitis  Discussed likely viral with current symptoms however he feels this will turn into his typical sinus infection  Viral swab sent out  He will continue flonase, nasal saline sprays, warm compresses  If after a week he is still experiencing sinusitis symptoms he can start the augmentin  Follow up if persists/worsens  Orders:    amoxicillin-pot clavulanate (AUGMENTIN) 875-125 mg per tablet; Take 1 tablet by mouth 2 (two) times a day for 7 days.    Sinus congestion    Orders:    COVID-19, FLU A+B AND RSV [facility]          LAURA Reyes  6/20/2025         [1]   Current Outpatient Medications on File Prior to Visit   Medication Sig Dispense Refill    amLODIPine (NORVASC) 5 mg tablet Take 1 tablet (5 mg total) by mouth daily. 30 tablet 5    amphetamine-dextroamphetamine XR (ADDERALL XR) 30 mg 24 hr capsule Take 1 capsule (30 mg " total) by mouth every morning. 30 capsule 0    candesartan (ATACAND) 32 mg tablet Take 1 tablet (32 mg total) by mouth daily. 90 tablet 1    hydrochlorothiazide 12.5 mg tablet Take 1 tablet (12.5 mg total) by mouth daily. 90 tablet 1    levocetirizine (XYZAL) 5 mg tablet Take 1 tablet (5 mg total) by mouth every evening. (Patient taking differently: Take 5 mg by mouth as needed.) 90 tablet 1    rosuvastatin (CRESTOR) 10 mg tablet Take 1 tablet (10 mg total) by mouth daily. 90 tablet 1    albuterol HFA 90 mcg/actuation inhaler Inhale 2 puffs every 6 (six) hours as needed for wheezing. 18 g 1    naproxen (NAPROSYN) 500 mg tablet Take 1 tablet (500 mg total) by mouth 2 (two) times a day as needed for moderate pain. 60 tablet 2     No current facility-administered medications on file prior to visit.

## 2025-06-21 LAB
FLUAV RNA RESP QL NAA+PROBE: NOT DETECTED
FLUBV RNA RESP QL NAA+PROBE: NOT DETECTED
RSV RNA RESP QL NAA+PROBE: NOT DETECTED
SARS-COV-2 RNA RESP QL NAA+PROBE: DETECTED

## 2025-06-23 ENCOUNTER — RESULTS FOLLOW-UP (OUTPATIENT)
Dept: PRIMARY CARE | Facility: CLINIC | Age: 60
End: 2025-06-23

## 2025-07-27 DIAGNOSIS — I10 ESSENTIAL HYPERTENSION, BENIGN: ICD-10-CM

## 2025-07-28 RX ORDER — AMLODIPINE BESYLATE 5 MG/1
5 TABLET ORAL DAILY
Qty: 90 TABLET | Refills: 1 | Status: SHIPPED | OUTPATIENT
Start: 2025-07-28 | End: 2026-01-24

## 2025-07-28 NOTE — TELEPHONE ENCOUNTER
Medicine Refill Request    Last Office Visit: 6/20/2025   Last Consult Visit: Visit date not found  Last Telemedicine Visit: 1/26/2022 Gilda Wayne MD    Next Appointment: 10/1/2025      Current Outpatient Medications:     albuterol HFA 90 mcg/actuation inhaler, Inhale 2 puffs every 6 (six) hours as needed for wheezing., Disp: 18 g, Rfl: 1    amLODIPine (NORVASC) 5 mg tablet, Take 1 tablet (5 mg total) by mouth daily., Disp: 30 tablet, Rfl: 5    amphetamine-dextroamphetamine XR (ADDERALL XR) 30 mg 24 hr capsule, Take 1 capsule (30 mg total) by mouth every morning., Disp: 30 capsule, Rfl: 0    candesartan (ATACAND) 32 mg tablet, Take 1 tablet (32 mg total) by mouth daily., Disp: 90 tablet, Rfl: 1    hydrochlorothiazide 12.5 mg tablet, Take 1 tablet (12.5 mg total) by mouth daily., Disp: 90 tablet, Rfl: 1    levocetirizine (XYZAL) 5 mg tablet, Take 1 tablet (5 mg total) by mouth every evening. (Patient taking differently: Take 5 mg by mouth as needed.), Disp: 90 tablet, Rfl: 1    naproxen (NAPROSYN) 500 mg tablet, Take 1 tablet (500 mg total) by mouth 2 (two) times a day as needed for moderate pain., Disp: 60 tablet, Rfl: 2    rosuvastatin (CRESTOR) 10 mg tablet, Take 1 tablet (10 mg total) by mouth daily., Disp: 90 tablet, Rfl: 1    BP Readings from Last 3 Encounters:   06/20/25 124/84   06/04/25 124/70   01/31/25 138/80       Recent Lab results:  Lab Results   Component Value Date    CHOL 153 06/05/2025   ,   Lab Results   Component Value Date    HDL 54 06/05/2025   ,   Lab Results   Component Value Date    LDLCALC 85 06/05/2025   ,   Lab Results   Component Value Date    TRIG 64 06/05/2025        Lab Results   Component Value Date    GLUCOSE 115 (H) 06/05/2025    GLUCOSE NEGATIVE 06/05/2025   ,   Lab Results   Component Value Date    HGBA1C 5.7 (H) 06/05/2025         Lab Results   Component Value Date    CREATININE 0.91 06/05/2025       Lab Results   Component Value Date    TSH 4.41 05/03/2022           Lab  Results   Component Value Date    BA1C 5.7 (H) 06/05/2025